# Patient Record
Sex: FEMALE | Race: OTHER | NOT HISPANIC OR LATINO | Employment: PART TIME | URBAN - METROPOLITAN AREA
[De-identification: names, ages, dates, MRNs, and addresses within clinical notes are randomized per-mention and may not be internally consistent; named-entity substitution may affect disease eponyms.]

---

## 2017-01-04 ENCOUNTER — HOSPITAL ENCOUNTER (OUTPATIENT)
Dept: LAB | Facility: MEDICAL CENTER | Age: 57
End: 2017-01-04
Attending: OBSTETRICS & GYNECOLOGY
Payer: COMMERCIAL

## 2017-01-04 LAB
HBV SURFACE AG SERPL QL IA: NEGATIVE
HIV 1+2 AB+HIV1 P24 AG SERPL QL IA: NON REACTIVE
TREPONEMA PALLIDUM IGG+IGM AB [PRESENCE] IN SERUM OR PLASMA BY IMMUNOASSAY: NON REACTIVE

## 2017-01-04 PROCEDURE — 86695 HERPES SIMPLEX TYPE 1 TEST: CPT | Mod: 91

## 2017-01-04 PROCEDURE — 86696 HERPES SIMPLEX TYPE 2 TEST: CPT | Mod: 91

## 2017-01-04 PROCEDURE — 87389 HIV-1 AG W/HIV-1&-2 AB AG IA: CPT

## 2017-01-04 PROCEDURE — 87340 HEPATITIS B SURFACE AG IA: CPT

## 2017-01-04 PROCEDURE — 87591 N.GONORRHOEAE DNA AMP PROB: CPT

## 2017-01-04 PROCEDURE — 86780 TREPONEMA PALLIDUM: CPT

## 2017-01-04 PROCEDURE — 36415 COLL VENOUS BLD VENIPUNCTURE: CPT

## 2017-01-04 PROCEDURE — 87491 CHLMYD TRACH DNA AMP PROBE: CPT

## 2017-01-05 ENCOUNTER — APPOINTMENT (OUTPATIENT)
Dept: RADIOLOGY | Facility: IMAGING CENTER | Age: 57
End: 2017-01-05
Attending: PHYSICIAN ASSISTANT
Payer: COMMERCIAL

## 2017-01-05 ENCOUNTER — OFFICE VISIT (OUTPATIENT)
Dept: URGENT CARE | Facility: PHYSICIAN GROUP | Age: 57
End: 2017-01-05
Payer: COMMERCIAL

## 2017-01-05 VITALS
SYSTOLIC BLOOD PRESSURE: 100 MMHG | BODY MASS INDEX: 20.09 KG/M2 | WEIGHT: 125 LBS | HEART RATE: 82 BPM | TEMPERATURE: 99.1 F | RESPIRATION RATE: 16 BRPM | HEIGHT: 66 IN | DIASTOLIC BLOOD PRESSURE: 72 MMHG | OXYGEN SATURATION: 94 %

## 2017-01-05 DIAGNOSIS — M79.675 PAIN OF TOE OF LEFT FOOT: ICD-10-CM

## 2017-01-05 DIAGNOSIS — S92.535A CLOSED NONDISPLACED FRACTURE OF DISTAL PHALANX OF LESSER TOE OF LEFT FOOT, INITIAL ENCOUNTER: ICD-10-CM

## 2017-01-05 LAB
C TRACH DNA GENITAL QL NAA+PROBE: NEGATIVE
N GONORRHOEA DNA GENITAL QL NAA+PROBE: NEGATIVE
SPECIMEN SOURCE: NORMAL

## 2017-01-05 PROCEDURE — 73660 X-RAY EXAM OF TOE(S): CPT | Mod: TC,LT | Performed by: PHYSICIAN ASSISTANT

## 2017-01-05 PROCEDURE — 99213 OFFICE O/P EST LOW 20 MIN: CPT | Performed by: PHYSICIAN ASSISTANT

## 2017-01-05 RX ORDER — LORATADINE 10 MG/1
10 TABLET ORAL DAILY
COMMUNITY
End: 2021-06-27

## 2017-01-05 ASSESSMENT — ENCOUNTER SYMPTOMS
CONSTITUTIONAL NEGATIVE: 1
NEUROLOGICAL NEGATIVE: 1

## 2017-01-05 NOTE — PROGRESS NOTES
"Subjective:      Madhavi Baires is a 56 y.o. female who presents with Toe Injury        Toe Injury    Patient presents today with 1-2 hour history of stubbing her left pinky toe.  She states that it currently is not too painful but was very much so at the time of injury.  She states her toe was angled outwards and she wrapped it and that seemed to improve.  She is having a hard time bending her 4th and 5th does at all.  No numbness or tingling. She has not taken any medication for this.     Review of Systems   Constitutional: Negative.    Musculoskeletal:        LEFT TOES/SEE HPI   Skin: Negative.    Neurological: Negative.    Endo/Heme/Allergies: Negative.        PMH:  has a past medical history of Chronic rhinitis (3/5/2013); Allergic rhinitis (3/5/2013); and Indigestion.  MEDS:   Current outpatient prescriptions:   •  loratadine (CLARITIN) 10 MG Tab, Take 10 mg by mouth every day., Disp: , Rfl:   ALLERGIES:   Allergies   Allergen Reactions   • Zithromax [Azithromycin Dihydrate] Rash     SURGHX:   Past Surgical History   Procedure Laterality Date   • Tonsillectomy     • Other  2004?     breast implants     SOCHX:  reports that she has never smoked. She has never used smokeless tobacco. She reports that she does not drink alcohol or use illicit drugs.  FH: Family history was reviewed, no pertinent findings to report     Objective:     /72 mmHg  Pulse 82  Temp(Src) 37.3 °C (99.1 °F)  Resp 16  Ht 1.676 m (5' 5.98\")  Wt 56.7 kg (125 lb)  BMI 20.19 kg/m2  SpO2 94%  Breastfeeding? No     Physical Exam   Constitutional: She is oriented to person, place, and time. She appears well-developed and well-nourished. No distress.   Eyes: Conjunctivae and EOM are normal. Pupils are equal, round, and reactive to light.   Neck: Normal range of motion. Neck supple.   Cardiovascular: Normal rate and regular rhythm.    Pulmonary/Chest: Effort normal and breath sounds normal.   Musculoskeletal:        " Feet:    Neurological: She is alert and oriented to person, place, and time.   Skin: Skin is warm and dry. No rash noted.   Psychiatric: She has a normal mood and affect. Her behavior is normal.   Vitals reviewed.       RAD    Impression        Nondisplaced fracture of the proximal phalanx of the fifth digit.    Hallux valgus deformity with mild degenerative changes of the first MTP joint.         Reading Provider Reading Date     Sharlene Roa M.D. Jan 5, 2017        Assessment/Plan:     1. Closed nondisplaced fracture of distal phalanx of lesser toe of left foot, initial encounter  DX-TOE(S) 2+ LEFT       -RAD as above, also reviewed by myself.   -espinoza taped digit, padding between toes.  Offered post op shoe however she declines.   -she will follow up with PCP for recheck, she declines Ortho referral or follow up  -ice elevate, NSAIDs, declines pain medication    Ashley Hart PA-C

## 2017-01-05 NOTE — MR AVS SNAPSHOT
"        Madhavi Baires   2017 10:55 AM   Office Visit   MRN: [id number]    Department:  [facility]   Dept Phone:  [phone]    Description:  Female : 1960   Provider:  Ashley Hart PA-C           Reason for Visit     Toe Injury x2 hours. Pt kicked a wall and injured Lt pinky toe. Redness, swelling, bruising. Lack of movement.       Allergies as of 2017     Allergen Noted Reactions    Zithromax [Azithromycin Dihydrate] 2012   Rash      You were diagnosed with     Closed nondisplaced fracture of distal phalanx of lesser toe of left foot, initial encounter   [822540]         Vital Signs     Blood Pressure Pulse Temperature Respirations Height Weight    100/72 mmHg 82 37.3 °C (99.1 °F) 16 1.676 m (5' 5.98\") 56.7 kg (125 lb)    Body Mass Index Oxygen Saturation Breastfeeding? Smoking Status          20.19 kg/m2 94% No Never Smoker         Basic Information     Date Of Birth Sex Race Ethnicity Preferred Language    1960 Female White Non- English      Your appointments     2017  2:20 PM   Access New Patient with IAN Paniagua   [facility] [address] [id number] [phone]           Please bring Photo ID, Insurance Cards, All Medication Bottles and copies of any legal documents (such as Living Will, Power of ) If speaking a language besides English please bring an adult . Please arrive 30 minutes prior for check in and registration. You will be receiving a confirmation call a few days before your appointment from our automated call confirmation system.              Problem List              ICD-10-CM Priority Class Noted - Resolved    Chronic rhinitis J31.0   3/5/2013 - Present    Allergic rhinitis    3/5/2013 - Present      Health Maintenance        Date Due Completion Dates    IMM DTaP/Tdap/Td Vaccine (1 - Tdap) 1978 ---    PAP SMEAR 1980 ---   " COLONOSCOPY 5/8/2009 ---    IMM INFLUENZA (1) 9/1/2016 1/15/2014    MAMMOGRAM 12/10/2016 12/10/2015, 10/17/2014, 1/5/2013 (Done), 3/1/2010, 3/1/2010, 10/15/2008, 10/15/2008, 7/17/2007, 7/17/2007, 6/27/2005, 6/8/2005    Override on 1/5/2013: Done (normal)            Current Immunizations     Influenza TIV (IM) 1/15/2014    Tuberculin Skin Test 5/7/2014  7:30 AM, 5/6/2013 10:50 AM, 5/14/2012 12:00 PM, 5/7/2012 12:15 PM, 4/19/2011  8:42 AM      Below and/or attached are the medications your provider expects you to take. Review all of your home medications and newly ordered medications with your provider and/or pharmacist. Follow medication instructions as directed by your provider and/or pharmacist. Please keep your medication list with you and share with your provider. Update the information when medications are discontinued, doses are changed, or new medications (including over-the-counter products) are added; and carry medication information at all times in the event of emergency situations     Allergies:  ZITHROMAX - Rash               Medications  Valid as of: January 05, 2017 - 12:31 PM    Generic Name Brand Name Tablet Size Instructions for use    Loratadine (Tab) CLARITIN 10 MG Take 10 mg by mouth every day.        .                 Medicines prescribed today were sent to:     [facility] [address] [place] [address] [place], [place] - [address] [facility] [address] [place] [address] [address] [address]    Phone: [phone] Fax: [phone]    Open 24 Hours?: No      Medication refill instructions:       If your prescription bottle indicates you have medication refills left, it is not necessary to call your provider’s office. Please contact your pharmacy and they will refill your medication.    If your prescription bottle indicates you do not have any refills left, you may request refills at any time through one of the following ways: The online ecobee system (except Urgent Care), by calling your provider’s  office, or by asking your pharmacy to contact your provider’s office with a refill request. Medication refills are processed only during regular business hours and may not be available until the next business day. Your provider may request additional information or to have a follow-up visit with you prior to refilling your medication.   *Please Note: Medication refills are assigned a new Rx number when refilled electronically. Your pharmacy may indicate that no refills were authorized even though a new prescription for the same medication is available at the pharmacy. Please request the medicine by name with the pharmacy before contacting your provider for a refill.        Your To Do List     Future Labs/Procedures Complete By Expires    DX-TOE(S) 2+ LEFT  As directed 1/5/2018         8bit Access Code: Activation code not generated  Current 8bit Status: Active

## 2017-01-17 ENCOUNTER — OFFICE VISIT (OUTPATIENT)
Dept: MEDICAL GROUP | Facility: PHYSICIAN GROUP | Age: 57
End: 2017-01-17
Payer: COMMERCIAL

## 2017-01-17 VITALS
OXYGEN SATURATION: 96 % | BODY MASS INDEX: 20.79 KG/M2 | HEIGHT: 66 IN | HEART RATE: 84 BPM | DIASTOLIC BLOOD PRESSURE: 70 MMHG | RESPIRATION RATE: 16 BRPM | WEIGHT: 129.4 LBS | TEMPERATURE: 98.3 F | SYSTOLIC BLOOD PRESSURE: 104 MMHG

## 2017-01-17 DIAGNOSIS — Z98.82 HISTORY OF BREAST IMPLANT: ICD-10-CM

## 2017-01-17 DIAGNOSIS — Z76.89 ENCOUNTER TO ESTABLISH CARE WITH NEW DOCTOR: ICD-10-CM

## 2017-01-17 PROCEDURE — 99203 OFFICE O/P NEW LOW 30 MIN: CPT | Performed by: NURSE PRACTITIONER

## 2017-01-17 ASSESSMENT — PATIENT HEALTH QUESTIONNAIRE - PHQ9: CLINICAL INTERPRETATION OF PHQ2 SCORE: 0

## 2017-01-17 NOTE — ASSESSMENT & PLAN NOTE
Would like a referral to a plastic surgeon to discuss breast implants.  Had a lumpectomy in 2003 and implants were placed as a result.  She would like to have them reevaluated to make sure that everything is ok.  Discussed options.  A referral will be sent to Dr. Mimi Abbasi.

## 2017-01-17 NOTE — Clinical Note
[facility]  AMAN Paniagua.  [address]  [facility] [address]  Fax: [phone] Authorization for Release/Disclosure of Protected Health Information   Name: ABBIE BAIRES : 1960 SSN: [id number]   Address: [address] [place] [address] Phone:    [phone] (home) [phone] (work)   I authorize the entity listed below to release/disclose the PHI below to [facility]/AMAN Paniagua.   Provider or Entity Name: Dr. Jackson   Address   [facility], [facility], [facility]   Phone:    Fax: [phone]   Reason for request: continuity of care   Information to be released:    [  ] LAST COLONOSCOPY, including any PATH REPORT [  ] LAST DEXA  [  ] LAST MAMMOGRAM  [  ] LAST PAP [  ] RETINA EXAM REPORT  [  ] IMMUNIZATION RECORDS  [X] Release all info      [  ] Check here and initial the line next to each item to release ALL health information INCLUDING  _____ Care and treatment for drug and / or alcohol abuse  _____ HIV testing, infection status, or AIDS  _____ Genetic Testing    DATES OF SERVICE OR TIME PERIOD TO BE DISCLOSED: _____________  I understand and acknowledge that:  * This Authorization may be revoked at any time by you in writing, except if your health information has already been used or disclosed.  * Your health information that will be used or disclosed as a result of you signing this authorization could be re-disclosed by the recipient. If this occurs, your re-disclosed health information may no longer be protected by State or Federal laws.  * You may refuse to sign this Authorization. Your refusal will not affect your ability to obtain treatment.  * This Authorization becomes effective upon signing and will  on (date) __________. If no date is indicated, this Authorization will  one (1) year from the signature date.    Name: Abbie Baires    Signature:   Date: 2017

## 2017-01-17 NOTE — MR AVS SNAPSHOT
"        Madhavi Baires   2017 2:20 PM   Office Visit   MRN: [id number]    Department:  [facility]   Dept Phone:  [phone]    Description:  Female : 1960   Provider:  IAN Paniagua           Reason for Visit     Establish Care           Allergies as of 2017     Allergen Noted Reactions    Zithromax [Azithromycin Dihydrate] 2012   Rash      You were diagnosed with     Encounter to establish care with new doctor   [[id number]]       History of breast implant   [[id number]]         Vital Signs     Blood Pressure Pulse Temperature Respirations Height Weight    104/70 mmHg 84 36.8 °C (98.3 °F) 16 1.676 m (5' 6\") 58.695 kg (129 lb 6.4 oz)    Body Mass Index Oxygen Saturation Smoking Status             20.90 kg/m2 96% Never Smoker          Basic Information     Date Of Birth Sex Race Ethnicity Preferred Language    1960 Female White Non- English      Problem List              ICD-10-CM Priority Class Noted - Resolved    Chronic rhinitis J31.0   3/5/2013 - Present    Allergic rhinitis    3/5/2013 - Present    Encounter to establish care with new doctor Z71.89   2017 - Present      Health Maintenance        Date Due Completion Dates    IMM DTaP/Tdap/Td Vaccine (1 - Tdap) 1978 ---    PAP SMEAR 1980 ---    COLONOSCOPY 2009 ---    IMM INFLUENZA (1) 2016 1/15/2014    MAMMOGRAM 12/10/2016 12/10/2015, 10/17/2014, 2013 (Done), 3/1/2010, 3/1/2010, 10/15/2008, 10/15/2008, 2007, 2007, 2005, 2005    Override on 2013: Done (normal)            Current Immunizations     Influenza TIV (IM) 1/15/2014    Tuberculin Skin Test 2014  7:30 AM, 2013 10:50 AM, 2012 12:00 PM, 2012 12:15 PM, 2011  8:42 AM      Below and/or attached are the medications your provider expects you to take. Review all of your home medications and newly ordered medications with your provider and/or pharmacist. Follow medication " instructions as directed by your provider and/or pharmacist. Please keep your medication list with you and share with your provider. Update the information when medications are discontinued, doses are changed, or new medications (including over-the-counter products) are added; and carry medication information at all times in the event of emergency situations     Allergies:  ZITHROMAX - Rash               Medications  Valid as of: January 17, 2017 -  3:01 PM    Generic Name Brand Name Tablet Size Instructions for use    Loratadine (Tab) CLARITIN 10 MG Take 10 mg by mouth every day.        .                 Medicines prescribed today were sent to:     [facility] #[id number] - [place], [place] - [address]    [address] [facility] [address] [place] [address]    Phone: [phone] Fax: [phone]    Open 24 Hours?: No      Medication refill instructions:       If your prescription bottle indicates you have medication refills left, it is not necessary to call your provider’s office. Please contact your pharmacy and they will refill your medication.    If your prescription bottle indicates you do not have any refills left, you may request refills at any time through one of the following ways: The online Vaddio system (except Urgent Care), by calling your provider’s office, or by asking your pharmacy to contact your provider’s office with a refill request. Medication refills are processed only during regular business hours and may not be available until the next business day. Your provider may request additional information or to have a follow-up visit with you prior to refilling your medication.   *Please Note: Medication refills are assigned a new Rx number when refilled electronically. Your pharmacy may indicate that no refills were authorized even though a new prescription for the same medication is available at the pharmacy. Please request the medicine by name with the pharmacy before contacting your provider for a refill.           Referral     A referral request has been sent to our patient care coordination department. Please allow 3-5 business days for us to process this request and contact you either by phone or mail. If you do not hear from us by the 5th business day, please call us at [phone].           Flybits Access Code: Activation code not generated  Current Flybits Status: Active

## 2017-01-17 NOTE — PROGRESS NOTES
Chief Complaint   Patient presents with   • Establish Care       HISTORY OF PRESENT ILLNESS: Patient is a 56 y.o. female  patient who is here to reestablish care with me in this office.  she was a patient of my previous office.  she is here today to discuss the following issues:    Encounter to establish care with new doctor  Is here to reestablish care with me.  Discussed recent test results that were done.    History of breast implant  Would like a referral to a plastic surgeon to discuss breast implants.  Had a lumpectomy in  and implants were placed as a result.  She would like to have them reevaluated to make sure that everything is ok.  Discussed options.  A referral will be sent to Dr. Mimi Abbasi.      Patient Active Problem List    Diagnosis Date Noted   • Encounter to establish care with new doctor 2017   • History of breast implant 2017   • Chronic rhinitis 2013   • Allergic rhinitis 2013       Allergies:Zithromax    Current Outpatient Prescriptions   Medication Sig Dispense Refill   • loratadine (CLARITIN) 10 MG Tab Take 10 mg by mouth every day.       No current facility-administered medications for this visit.       Social History   Substance Use Topics   • Smoking status: Never Smoker    • Smokeless tobacco: Never Used   • Alcohol Use: No       Family Status   Relation Status Death Age   • Mother     • Father       Family History   Problem Relation Age of Onset   • Diabetes Mother    • Heart Disease Mother      cad   • Cancer Father 44     stomach   • Cancer Maternal Aunt      colon   • Diabetes Maternal Grandmother    • Cancer Maternal Grandmother      breast   • Hypertension Neg Hx    • Hyperlipidemia Neg Hx    • Stroke Neg Hx        Review of Systems:   Constitutional: Negative for fever, chills, weight loss and malaise/fatigue.   HENT: Negative for ear pain, nosebleeds, congestion, sore throat and neck pain.    Eyes: Negative for blurred vision.  "  Respiratory: Negative for cough, sputum production, shortness of breath and wheezing.    Cardiovascular: Negative for chest pain, palpitations, orthopnea and leg swelling.   Gastrointestinal: Negative for heartburn, nausea, vomiting and abdominal pain.   Genitourinary: Negative for dysuria, urgency and frequency.   Musculoskeletal: Negative for myalgias, joint pain, and back pain.  Skin: Negative for rash and itching.   Neurological: Negative for dizziness, tingling, tremors, sensory change, focal weakness and headaches.   Endo/Heme/Allergies: Does not bruise/bleed easily.   Psychiatric/Behavioral: Negative for depression, suicidal ideas and memory loss.  The patient is not nervous/anxious and does not have insomnia.    All other systems reviewed and are negative except as in HPI.    Exam:  Blood pressure 104/70, pulse 84, temperature 36.8 °C (98.3 °F), resp. rate 16, height 1.676 m (5' 6\"), weight 58.695 kg (129 lb 6.4 oz), SpO2 96 %.  General:  Well nourished, well developed female in NAD  Head: Grossly normal.  Neck: Supple without JVD or bruit. Thyroid is not enlarged.  Pulmonary: Clear to ausculation. Normal effort. No rales, ronchi, or wheezing.  Cardiovascular: Regular rate and rhythm without murmur.   Extremities: No clubbing, cyanosis, or edema.  Skin: Intact with no obvious rashes or lesions.  Neuro: Grossly intact.  Psych: Alert and oriented x 3.  Mood and affect appropriate.    Medical decision-making and discussion: Madhavi is here to reestablish care with me.  We reviewed her past medical history and discussed her breast implants.  A referral was sent to Dr. Mimi Abbasi. She will sign a records release for my previous office, she will sign up with [facility], and she will plan to follow-up here as needed.         Assessment/Plan:  1. Encounter to establish care with new doctor     2. History of breast implant  REFERRAL TO PLASTIC SURGERY       Return if symptoms worsen or fail to " improve.    Please note that this dictation was created using voice recognition software. I have made every reasonable attempt to correct obvious errors, but I expect that there are errors of grammar and possibly content that I did not discover before finalizing the note.

## 2017-02-06 LAB
HSV1 GG IGG SER-ACNC: 0.11 IV
HSV2 GG IGG SER-ACNC: 9.04 IV
TEST NAME 95000: NORMAL

## 2017-03-09 ENCOUNTER — OFFICE VISIT (OUTPATIENT)
Dept: MEDICAL GROUP | Facility: PHYSICIAN GROUP | Age: 57
End: 2017-03-09
Payer: COMMERCIAL

## 2017-03-09 VITALS
HEART RATE: 74 BPM | DIASTOLIC BLOOD PRESSURE: 60 MMHG | SYSTOLIC BLOOD PRESSURE: 98 MMHG | OXYGEN SATURATION: 96 % | BODY MASS INDEX: 20.25 KG/M2 | WEIGHT: 126 LBS | TEMPERATURE: 100.2 F | RESPIRATION RATE: 16 BRPM | HEIGHT: 66 IN

## 2017-03-09 DIAGNOSIS — L98.9 SKIN LESIONS, GENERALIZED: ICD-10-CM

## 2017-03-09 DIAGNOSIS — Z12.83 SKIN CANCER SCREENING: ICD-10-CM

## 2017-03-09 PROBLEM — Z76.89 ENCOUNTER TO ESTABLISH CARE WITH NEW DOCTOR: Status: RESOLVED | Noted: 2017-01-17 | Resolved: 2017-03-09

## 2017-03-09 PROBLEM — Z98.82 HISTORY OF BREAST IMPLANT: Status: RESOLVED | Noted: 2017-01-17 | Resolved: 2017-03-09

## 2017-03-09 PROCEDURE — 99213 OFFICE O/P EST LOW 20 MIN: CPT | Performed by: NURSE PRACTITIONER

## 2017-03-09 NOTE — MR AVS SNAPSHOT
"        Madhavi Baires   3/9/2017 2:40 PM   Office Visit   MRN: [id number]    Department:  [facility]   Dept Phone:  [phone]    Description:  Female : 1960   Provider:  IAN Paniagua           Reason for Visit     Referral Needed dermatologist      Allergies as of 3/9/2017     Allergen Noted Reactions    Zithromax [Azithromycin Dihydrate] 2012   Rash      You were diagnosed with     Skin lesions, generalized   [063856]       Skin cancer screening   [241646]         Vital Signs     Blood Pressure Pulse Temperature Respirations Height Weight    98/60 mmHg 74 37.9 °C (100.2 °F) 16 1.676 m (5' 5.98\") 57.153 kg (126 lb)    Body Mass Index Oxygen Saturation Last Menstrual Period Smoking Status          20.35 kg/m2 96% 2015 Never Smoker         Basic Information     Date Of Birth Sex Race Ethnicity Preferred Language    1960 Female White Non- English      Problem List              ICD-10-CM Priority Class Noted - Resolved    Chronic rhinitis J31.0   3/5/2013 - Present    Allergic rhinitis    3/5/2013 - Present    Encounter to establish care with new doctor Z71.89   2017 - Present    History of breast implant Z98.82   2017 - Present      Health Maintenance        Date Due Completion Dates    IMM DTaP/Tdap/Td Vaccine (1 - Tdap) 1978 ---    PAP SMEAR 1980 ---    COLONOSCOPY 2009 ---    IMM INFLUENZA (1) 2016 1/15/2014    MAMMOGRAM 12/10/2016 12/10/2015, 10/17/2014, 2013 (Done), 3/1/2010, 3/1/2010, 10/15/2008, 10/15/2008, 2007, 2007, 2005, 2005    Override on 2013: Done (normal)            Current Immunizations     Influenza TIV (IM) 1/15/2014    Tuberculin Skin Test 2014  7:30 AM, 2013 10:50 AM, 2012 12:00 PM, 2012 12:15 PM, 2011  8:42 AM      Below and/or attached are the medications your provider expects you to take. Review all of your home medications and newly ordered " medications with your provider and/or pharmacist. Follow medication instructions as directed by your provider and/or pharmacist. Please keep your medication list with you and share with your provider. Update the information when medications are discontinued, doses are changed, or new medications (including over-the-counter products) are added; and carry medication information at all times in the event of emergency situations     Allergies:  ZITHROMAX - Rash               Medications  Valid as of: March 09, 2017 -  3:05 PM    Generic Name Brand Name Tablet Size Instructions for use    Loratadine (Tab) CLARITIN 10 MG Take 10 mg by mouth every day.        .                 Medicines prescribed today were sent to:     [facility]Y #[id number] - [place], [place] - [address] [facility] [address]    [address] [facility] [address] [place] [address]    Phone: [phone] Fax: [phone]    Open 24 Hours?: No      Medication refill instructions:       If your prescription bottle indicates you have medication refills left, it is not necessary to call your provider’s office. Please contact your pharmacy and they will refill your medication.    If your prescription bottle indicates you do not have any refills left, you may request refills at any time through one of the following ways: The online Yamli system (except Urgent Care), by calling your provider’s office, or by asking your pharmacy to contact your provider’s office with a refill request. Medication refills are processed only during regular business hours and may not be available until the next business day. Your provider may request additional information or to have a follow-up visit with you prior to refilling your medication.   *Please Note: Medication refills are assigned a new Rx number when refilled electronically. Your pharmacy may indicate that no refills were authorized even though a new prescription for the same medication is available at the pharmacy. Please request the medicine by name  with the pharmacy before contacting your provider for a refill.        Referral     A referral request has been sent to our patient care coordination department. Please allow 3-5 business days for us to process this request and contact you either by phone or mail. If you do not hear from us by the 5th business day, please call us at [phone].           Health Catalyst Access Code: Activation code not generated  Current Health Catalyst Status: Active

## 2017-03-09 NOTE — PROGRESS NOTES
Chief Complaint   Patient presents with   • Referral Needed     dermatologist       HISTORY OF PRESENT ILLNESS: Patient is a 56 y.o. female established patient who presents today to discuss the following issues:    Skin lesions, generalized  Has many hemangiomas and other skin lesions.  She would like a referral to dermatology to have them evaluated and possibly removed.        Patient Active Problem List    Diagnosis Date Noted   • Skin lesions, generalized 2017   • Allergic rhinitis 2013       Allergies:Zithromax    Current Outpatient Prescriptions   Medication Sig Dispense Refill   • loratadine (CLARITIN) 10 MG Tab Take 10 mg by mouth every day.       No current facility-administered medications for this visit.       Social History   Substance Use Topics   • Smoking status: Never Smoker    • Smokeless tobacco: Never Used   • Alcohol Use: No       Family Status   Relation Status Death Age   • Mother     • Father       Family History   Problem Relation Age of Onset   • Diabetes Mother    • Heart Disease Mother      cad   • Cancer Father 44     stomach   • Cancer Maternal Aunt      colon   • Diabetes Maternal Grandmother    • Cancer Maternal Grandmother      breast   • Hypertension Neg Hx    • Hyperlipidemia Neg Hx    • Stroke Neg Hx        Review of Systems:   Constitutional: Negative for fever, chills, weight loss and malaise/fatigue.   Skin: Negative for rash and itching. 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  Neurological: Negative for dizziness, tingling, tremors, sensory change, focal weakness and headaches.   Endo/Heme/Allergies: Does not bruise/bleed easily.   Psychiatric/Behavioral: Negative for depression, suicidal ideas and memory loss.  The patient is not nervous/anxious and does not have insomnia.    All other systems reviewed and are negative except as in HPI.    Exam:  Blood pressure 98/60, pulse 74, temperature 37.9 °C (100.2 °F), resp. rate 16, height  "1.676 m (5' 5.98\"), weight 57.153 kg (126 lb), last menstrual period 03/09/2015, SpO2 96 %.  General:  Well nourished, well developed female in NAD  Head: Grossly normal.  Extremities: No clubbing, cyanosis, or edema.  Skin: Intact with no obvious rashes or lesions.  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  Neuro: Grossly intact.  Psych: Alert and oriented x 3.  Mood and affect appropriate.    Medical decision-making and discussion: Madhavi is here today to discuss referral.  A referral was sent to dermatology, and she will follow-up here as needed.          Assessment/Plan:  1. Skin lesions, generalized  REFERRAL TO DERMATOLOGY   2. Skin cancer screening  REFERRAL TO DERMATOLOGY       Return if symptoms worsen or fail to improve.    Please note that this dictation was created using voice recognition software. I have made every reasonable attempt to correct obvious errors, but I expect that there are errors of grammar and possibly content that I did not discover before finalizing the note.    "

## 2017-03-09 NOTE — ASSESSMENT & PLAN NOTE
Has many hemangiomas and other skin lesions.  She would like a referral to dermatology to have them evaluated and possibly removed.

## 2017-08-10 ENCOUNTER — HOSPITAL ENCOUNTER (OUTPATIENT)
Facility: MEDICAL CENTER | Age: 57
End: 2017-08-10
Payer: COMMERCIAL

## 2017-08-10 LAB
BDY FAT % MEASURED: 28.3 %
BP DIAS: 75 MMHG
BP SYS: 114 MMHG
CHOLEST SERPL-MCNC: 202 MG/DL (ref 100–199)
DIABETES HTDIA: NO
EVENT NAME HTEVT: NORMAL
FASTING HTFAS: YES
GLUCOSE SERPL-MCNC: 88 MG/DL (ref 65–99)
HDLC SERPL-MCNC: 60 MG/DL
HYPERTENSION HTHYP: NO
LDLC SERPL CALC-MCNC: 124 MG/DL
SCREENING LOC CITY HTCIT: NORMAL
SCREENING LOC STATE HTSTA: NORMAL
SCREENING LOCATION HTLOC: NORMAL
SMOKING HTSMO: NO
SUBSCRIBER ID HTSID: NORMAL
TRIGL SERPL-MCNC: 92 MG/DL (ref 0–149)

## 2017-08-10 PROCEDURE — 36415 COLL VENOUS BLD VENIPUNCTURE: CPT

## 2017-08-10 PROCEDURE — 82947 ASSAY GLUCOSE BLOOD QUANT: CPT

## 2017-08-10 PROCEDURE — S5190 WELLNESS ASSESSMENT BY NONPH: HCPCS

## 2017-08-10 PROCEDURE — 80061 LIPID PANEL: CPT

## 2017-09-12 ENCOUNTER — APPOINTMENT (OUTPATIENT)
Dept: RADIOLOGY | Facility: MEDICAL CENTER | Age: 57
End: 2017-09-12
Attending: OBSTETRICS & GYNECOLOGY
Payer: COMMERCIAL

## 2017-11-14 ENCOUNTER — IMMUNIZATION (OUTPATIENT)
Dept: OCCUPATIONAL MEDICINE | Facility: CLINIC | Age: 57
End: 2017-11-14

## 2017-11-14 DIAGNOSIS — Z23 NEED FOR VACCINATION: ICD-10-CM

## 2017-11-14 PROCEDURE — 90686 IIV4 VACC NO PRSV 0.5 ML IM: CPT | Performed by: PREVENTIVE MEDICINE

## 2017-11-15 ENCOUNTER — OFFICE VISIT (OUTPATIENT)
Dept: MEDICAL GROUP | Facility: PHYSICIAN GROUP | Age: 57
End: 2017-11-15
Payer: COMMERCIAL

## 2017-11-15 VITALS
DIASTOLIC BLOOD PRESSURE: 80 MMHG | RESPIRATION RATE: 14 BRPM | HEART RATE: 65 BPM | OXYGEN SATURATION: 97 % | SYSTOLIC BLOOD PRESSURE: 128 MMHG | WEIGHT: 134 LBS | HEIGHT: 66 IN | BODY MASS INDEX: 21.53 KG/M2 | TEMPERATURE: 99.3 F

## 2017-11-15 DIAGNOSIS — Z12.11 ENCOUNTER FOR SCREENING FECAL OCCULT BLOOD TESTING: ICD-10-CM

## 2017-11-15 DIAGNOSIS — T23.162A SUPERFICIAL BURN OF BACK OF LEFT HAND, INITIAL ENCOUNTER: ICD-10-CM

## 2017-11-15 PROCEDURE — 99214 OFFICE O/P EST MOD 30 MIN: CPT | Performed by: NURSE PRACTITIONER

## 2017-11-15 RX ORDER — AMOXICILLIN AND CLAVULANATE POTASSIUM 875; 125 MG/1; MG/1
1 TABLET, FILM COATED ORAL 2 TIMES DAILY
Qty: 20 TAB | Refills: 0 | Status: SHIPPED | OUTPATIENT
Start: 2017-11-15 | End: 2018-01-07
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
## 2017-11-28 ENCOUNTER — HOSPITAL ENCOUNTER (OUTPATIENT)
Facility: MEDICAL CENTER | Age: 57
End: 2017-11-28
Attending: NURSE PRACTITIONER
Payer: COMMERCIAL

## 2017-11-28 PROCEDURE — 82274 ASSAY TEST FOR BLOOD FECAL: CPT

## 2017-11-30 DIAGNOSIS — Z12.11 ENCOUNTER FOR SCREENING FECAL OCCULT BLOOD TESTING: ICD-10-CM

## 2017-11-30 LAB — HEMOCCULT STL QL IA: NEGATIVE

## 2017-12-04 ENCOUNTER — TELEPHONE (OUTPATIENT)
Dept: MEDICAL GROUP | Facility: PHYSICIAN GROUP | Age: 57
End: 2017-12-04

## 2017-12-04 NOTE — LETTER
December 4, 2017         Madhavi Hoyos  [address] [facility] [place] [address]        Dear Madhavi:      Below are the results from your recent visit:    Please notify patient that stool test is negative for blood.  This test should be repeated annually for colon cancer screening.   AVE Mae Orders   OCCULT BLOOD FECES IMMUNOASSAY   Result Value Ref Range    Occult Blood, IA Negative Negative     If you have any questions or concerns, please don't hesitate to call.    Electronically Signed

## 2017-12-05 NOTE — TELEPHONE ENCOUNTER
----- Message from AVE Mae sent at 12/4/2017 12:32 PM PST -----  Please notify patient that stool test is negative for blood.  This test should be repeated annually for colon cancer screening.  AVE Mae

## 2018-01-07 ENCOUNTER — OFFICE VISIT (OUTPATIENT)
Dept: URGENT CARE | Facility: PHYSICIAN GROUP | Age: 58
End: 2018-01-07
Payer: COMMERCIAL

## 2018-01-07 VITALS
DIASTOLIC BLOOD PRESSURE: 70 MMHG | SYSTOLIC BLOOD PRESSURE: 90 MMHG | WEIGHT: 134 LBS | OXYGEN SATURATION: 98 % | HEART RATE: 69 BPM | RESPIRATION RATE: 16 BRPM | BODY MASS INDEX: 21.63 KG/M2 | TEMPERATURE: 97.4 F

## 2018-01-07 DIAGNOSIS — J01.00 ACUTE MAXILLARY SINUSITIS, RECURRENCE NOT SPECIFIED: Primary | ICD-10-CM

## 2018-01-07 PROCEDURE — 99214 OFFICE O/P EST MOD 30 MIN: CPT | Performed by: PHYSICIAN ASSISTANT

## 2018-01-07 RX ORDER — ASPIRIN 325 MG
325 TABLET ORAL EVERY 6 HOURS PRN
COMMUNITY
End: 2018-05-04

## 2018-01-07 RX ORDER — AMOXICILLIN 875 MG/1
875 TABLET, COATED ORAL 2 TIMES DAILY
Qty: 20 TAB | Refills: 0 | Status: SHIPPED | OUTPATIENT
Start: 2018-01-07 | End: 2018-05-04

## 2018-01-07 ASSESSMENT — ENCOUNTER SYMPTOMS
SORE THROAT: 0
COUGH: 0
HEADACHES: 1
FEVER: 0

## 2018-01-07 ASSESSMENT — PAIN SCALES - GENERAL: PAINLEVEL: 1=MINIMAL PAIN

## 2018-01-07 NOTE — PROGRESS NOTES
Subjective:      Madhavi Hoyos is a 57 y.o. female who presents with Otalgia (x2 weeks. Lt ear pain, slight dizziness. Pt is worried about a sinus infection.)    PMH:  has a past medical history of Allergic rhinitis (3/5/2013); Chronic rhinitis (3/5/2013); and Indigestion.  MEDS:   Current Outpatient Prescriptions:   •  aspirin (ASA) 325 MG Tab, Take 325 mg by mouth every 6 hours as needed., Disp: , Rfl:   •  loratadine (CLARITIN) 10 MG Tab, Take 10 mg by mouth every day., Disp: , Rfl:   •  amoxicillin-clavulanate (AUGMENTIN) 875-125 MG Tab, Take 1 Tab by mouth 2 times a day., Disp: 20 Tab, Rfl: 0  ALLERGIES:   Allergies   Allergen Reactions   • Zithromax [Azithromycin Dihydrate] Rash     SURGHX:   Past Surgical History:   Procedure Laterality Date   • OTHER  2004?    breast implants   • TONSILLECTOMY       SOCHX:  reports that she has never smoked. She has never used smokeless tobacco. She reports that she does not drink alcohol or use drugs.  FH: family history includes Cancer in her maternal aunt and maternal grandmother; Cancer (age of onset: 44) in her father; Diabetes in her maternal grandmother and mother; Heart Disease in her mother. Reviewed with patient/family. Not pertinent to this complaint.            Patient presents to clinic today with complaint of sinus pain, pressure, sinus headache, and ear pain ×2 weeks. Patient states it started with a typical cold, cough is resolved, but sinus pain and congestion has remained. Patient states she has had some persistent left ear pain ×4 days. Patient has been taking over-the-counter medications with very little relief. Patient also states little bit of vertigo type sensation when she got out of bed this morning which improved after sitting up for a few hours. Patient denies any other complaint.      Otalgia    There is pain in the left ear. This is a new problem. The current episode started 1 to 4 weeks ago. The problem occurs constantly. There has  been no fever. The pain is at a severity of 5/10. Associated symptoms include headaches. Pertinent negatives include no coughing or sore throat. Associated symptoms comments: Sinus pain and pressure. There is no history of a chronic ear infection.       Review of Systems   Constitutional: Negative for fever.   HENT: Positive for congestion and ear pain. Negative for sore throat.    Respiratory: Negative for cough.    Neurological: Positive for headaches.   All other systems reviewed and are negative.         Objective:     BP (!) 90/70   Pulse 69   Temp 36.3 °C (97.4 °F)   Resp 16   Wt 60.8 kg (134 lb)   SpO2 98%   Breastfeeding? No   BMI 21.63 kg/m²      Physical Exam   Constitutional: She is oriented to person, place, and time. She appears well-developed and well-nourished. No distress.   HENT:   Head: Normocephalic and atraumatic.   Right Ear: A middle ear effusion is present.   Left Ear: Tympanic membrane normal.   Nose: Mucosal edema present. Right sinus exhibits maxillary sinus tenderness. Left sinus exhibits maxillary sinus tenderness.   Mouth/Throat: Uvula is midline and mucous membranes are normal. Posterior oropharyngeal erythema present. No oropharyngeal exudate or posterior oropharyngeal edema.   Eyes: Conjunctivae, EOM and lids are normal. Pupils are equal, round, and reactive to light.   Neck: Normal range of motion. Neck supple.   Cardiovascular: Normal rate and regular rhythm.    Pulmonary/Chest: Effort normal and breath sounds normal. No respiratory distress.   Abdominal: Soft.   Musculoskeletal: Normal range of motion.   Lymphadenopathy:     She has no cervical adenopathy.   Neurological: She is alert and oriented to person, place, and time.   Skin: Skin is warm and dry. Capillary refill takes less than 2 seconds.   Psychiatric: She has a normal mood and affect.   Nursing note and vitals reviewed.              Assessment/Plan:     1. Acute maxillary sinusitis, recurrence not specified   amoxicillin (AMOXIL) 875 MG tablet     Motrin/Advil/Ibuprophen 600 mg every 6 hours as needed for pain or fever.    PT should follow up with PCP in 1-2 days for re-evaluation if symptoms have not improved.  Discussed red flags and reasons to return to UC or ED.  Pt and/or family verbalized understanding of diagnosis and follow up instructions and was offered informational handout on diagnosis.  PT discharged.

## 2018-02-27 ENCOUNTER — HOSPITAL ENCOUNTER (OUTPATIENT)
Dept: RADIOLOGY | Facility: MEDICAL CENTER | Age: 58
End: 2018-02-27
Attending: OBSTETRICS & GYNECOLOGY
Payer: COMMERCIAL

## 2018-02-27 DIAGNOSIS — Z12.31 ENCOUNTER FOR SCREENING MAMMOGRAM FOR MALIGNANT NEOPLASM OF BREAST: ICD-10-CM

## 2018-02-27 PROCEDURE — 77067 SCR MAMMO BI INCL CAD: CPT

## 2018-05-04 ENCOUNTER — OFFICE VISIT (OUTPATIENT)
Dept: MEDICAL GROUP | Facility: MEDICAL CENTER | Age: 58
End: 2018-05-04
Payer: COMMERCIAL

## 2018-05-04 VITALS
DIASTOLIC BLOOD PRESSURE: 64 MMHG | TEMPERATURE: 98 F | RESPIRATION RATE: 16 BRPM | SYSTOLIC BLOOD PRESSURE: 126 MMHG | BODY MASS INDEX: 21.86 KG/M2 | OXYGEN SATURATION: 94 % | WEIGHT: 136 LBS | HEIGHT: 66 IN | HEART RATE: 75 BPM

## 2018-05-04 DIAGNOSIS — J01.00 ACUTE NON-RECURRENT MAXILLARY SINUSITIS: ICD-10-CM

## 2018-05-04 DIAGNOSIS — R09.81 SINUS CONGESTION: ICD-10-CM

## 2018-05-04 PROCEDURE — 99214 OFFICE O/P EST MOD 30 MIN: CPT | Performed by: NURSE PRACTITIONER

## 2018-05-04 RX ORDER — FLUTICASONE PROPIONATE 50 MCG
2 SPRAY, SUSPENSION (ML) NASAL DAILY
Qty: 16 G | Refills: 11 | Status: SHIPPED | OUTPATIENT
Start: 2018-05-04 | End: 2021-06-27

## 2018-05-04 RX ORDER — AZELASTINE 1 MG/ML
1 SPRAY, METERED NASAL 2 TIMES DAILY
Qty: 30 ML | Refills: 11 | Status: SHIPPED | OUTPATIENT
Start: 2018-05-04 | End: 2018-12-26

## 2018-05-04 ASSESSMENT — PATIENT HEALTH QUESTIONNAIRE - PHQ9: CLINICAL INTERPRETATION OF PHQ2 SCORE: 0

## 2018-05-04 ASSESSMENT — ENCOUNTER SYMPTOMS: HEADACHES: 1

## 2018-05-04 NOTE — PROGRESS NOTES
Subjective:      Madhavi Hoyos is a 57 y.o. female who presents with Other (possiblesinus infection started yesterday)        CC: Patient here today mainly for sinus complaints. This is a same day visit and her normal medical provider is .    HPI Madhavi Hoyos gives history of sinus congestion for approximately the last 2 days. The pain is mostly in her maxillary region bilaterally and with this pain and pressure she has been having postnasal drip and stomach upset. It appears she was treated in January at urgent care for a possible sinusitis although she states she did not take the antibiotics. Her main concern if she would be traveling this weekend and knows that her ears and sinuses will feel worse with travel at high altitude. She brings a form and with her today.    Patient admits she probably has some allergies and does do sinus irrigation but is not using nasal sprays or antihistamines currently. She states she did see an ENT years ago and was told she did have blockage of her sinuses but no surgery was recommended.      Social History   Substance Use Topics   • Smoking status: Never Smoker   • Smokeless tobacco: Never Used   • Alcohol use No     Current Outpatient Prescriptions   Medication Sig Dispense Refill   • fluticasone (FLONASE) 50 MCG/ACT nasal spray Spray 2 Sprays in nose every day. 16 g 11   • azelastine (ASTELIN) 137 MCG/SPRAY nasal spray [place] 1 Spray in nose 2 times a day. 30 mL 11   • loratadine (CLARITIN) 10 MG Tab Take 10 mg by mouth every day.       No current facility-administered medications for this visit.      Past Medical History:   Diagnosis Date   • Allergic rhinitis 3/5/2013   • Chronic rhinitis 3/5/2013   • Indigestion      Family History   Problem Relation Age of Onset   • Diabetes Mother    • Heart Disease Mother      cad   • Cancer Father 44     stomach   • Cancer Maternal Aunt      colon   • Diabetes Maternal Grandmother    • Cancer Maternal  "Grandmother      breast   • Breast Cancer Maternal Grandmother    • Hypertension Neg Hx    • Hyperlipidemia Neg Hx    • Stroke Neg Hx        Review of Systems   HENT: Positive for congestion.    Neurological: Positive for headaches.   All other systems reviewed and are negative.         Objective:     /64   Pulse 75   Temp 36.7 °C (98 °F)   Resp 16   Ht 1.676 m (5' 6\")   Wt 61.7 kg (136 lb)   SpO2 94%   BMI 21.95 kg/m²      Physical Exam   Constitutional: She is oriented to person, place, and time. She appears well-developed and well-nourished. No distress.   HENT:   Head: Normocephalic and atraumatic.   Right Ear: External ear normal.   Left Ear: External ear normal.   Nose: Mucosal edema and sinus tenderness present. Right sinus exhibits maxillary sinus tenderness. Left sinus exhibits maxillary sinus tenderness.   Eyes: Right eye exhibits no discharge. Left eye exhibits no discharge.   Neck: Normal range of motion. Neck supple. No thyromegaly present.   Cardiovascular: Normal rate, regular rhythm and normal heart sounds.  Exam reveals no gallop and no friction rub.    No murmur heard.  Pulmonary/Chest: Effort normal and breath sounds normal. She has no wheezes. She has no rales.   Musculoskeletal: She exhibits no edema or tenderness.   Neurological: She is alert and oriented to person, place, and time. She displays normal reflexes.   Skin: Skin is warm and dry. No rash noted. She is not diaphoretic.   Psychiatric: She has a normal mood and affect. Her behavior is normal. Judgment and thought content normal.   Nursing note and vitals reviewed.              Assessment/Plan:     1. Sinus congestion/sinusitis  I think it is too early to call this a sinus infection but she does have sinus congestion and pain. I agree that flying at high altitude at this point could cause a sinus infection or at the very least increased pain in her ears and sinuses. I will therefore fill out paperwork with Dr. Jolley for " her not to fly this weekend. I will start her on Flonase nasal spray as well as an antihistamine nasal spray to help with symptoms. I told her if this is recurring on a regular basis she may wish to consider an ENT referral. She does have her prescription of amoxicillin available from January which she was advised to start on if symptoms do not improve in the next week. Case discussed with Dr. Jolley and form for airline filled out together.  - fluticasone (FLONASE) 50 MCG/ACT nasal spray; Spray 2 Sprays in nose every day.  Dispense: 16 g; Refill: 11  - azelastine (ASTELIN) 137 MCG/SPRAY nasal spray; Spray 1 Spray in nose 2 times a day.  Dispense: 30 mL; Refill: 11

## 2018-05-29 ENCOUNTER — OFFICE VISIT (OUTPATIENT)
Dept: MEDICAL GROUP | Facility: MEDICAL CENTER | Age: 58
End: 2018-05-29
Payer: COMMERCIAL

## 2018-05-29 VITALS
SYSTOLIC BLOOD PRESSURE: 112 MMHG | BODY MASS INDEX: 21.86 KG/M2 | OXYGEN SATURATION: 97 % | TEMPERATURE: 98.4 F | HEART RATE: 71 BPM | DIASTOLIC BLOOD PRESSURE: 70 MMHG | RESPIRATION RATE: 16 BRPM | WEIGHT: 136 LBS | HEIGHT: 66 IN

## 2018-05-29 DIAGNOSIS — H92.02 ACUTE OTALGIA, LEFT: ICD-10-CM

## 2018-05-29 DIAGNOSIS — H66.90 ACUTE OTITIS MEDIA, UNSPECIFIED OTITIS MEDIA TYPE: ICD-10-CM

## 2018-05-29 PROCEDURE — 99214 OFFICE O/P EST MOD 30 MIN: CPT | Performed by: NURSE PRACTITIONER

## 2018-05-29 RX ORDER — AMOXICILLIN AND CLAVULANATE POTASSIUM 875; 125 MG/1; MG/1
1 TABLET, FILM COATED ORAL 2 TIMES DAILY
Qty: 20 TAB | Refills: 0 | Status: SHIPPED | OUTPATIENT
Start: 2018-05-29 | End: 2018-06-08

## 2018-05-29 NOTE — PROGRESS NOTES
Subjective:      Madhavi Hoyos is a 58 y.o. female who presents with Otalgia (left ear) and Dizziness        CC: Patient here same day visit for recurring left ear pain.    HPI Madhavi Hoyos gives history of problems on and off for a few years which usually affects her left ear. She states that she will get pruritus of the ear canal as well as ear pain. She was seen about 3 weeks ago for sinus issues and was placed on Astelin and Flonase nasal spray. She states this is helped with her sinus issues and she was given a letter so she did not have to fly knowing that this would have made things worse.    Her current problem started a few days ago and she has been using a leftover antibiotic eardrop which was prescribed a few years ago. She states there is discomfort in the left ear and throbbing. She denies fever, discharge, cough, shortness of breath, sore throat or chills. Nothing makes her feel better or worse. She states she did see ENT years ago but would like another referral.        Social History   Substance Use Topics   • Smoking status: Never Smoker   • Smokeless tobacco: Never Used   • Alcohol use No     Past Medical History:   Diagnosis Date   • Allergic rhinitis 3/5/2013   • Chronic rhinitis 3/5/2013   • Indigestion      Current Outpatient Prescriptions   Medication Sig Dispense Refill   • amoxicillin-clavulanate (AUGMENTIN) 875-125 MG Tab Take 1 Tab by mouth 2 times a day for 10 days. 20 Tab 0   • fluticasone (FLONASE) 50 MCG/ACT nasal spray Spray 2 Sprays in nose every day. 16 g 11   • azelastine (ASTELIN) 137 MCG/SPRAY nasal spray [place] 1 Spray in nose 2 times a day. 30 mL 11   • loratadine (CLARITIN) 10 MG Tab Take 10 mg by mouth every day.       No current facility-administered medications for this visit.      Family History   Problem Relation Age of Onset   • Diabetes Mother    • Heart Disease Mother      cad   • Cancer Father 44     stomach   • Cancer Maternal Aunt      colon  "  • Diabetes Maternal Grandmother    • Cancer Maternal Grandmother      breast   • Breast Cancer Maternal Grandmother    • Hypertension Neg Hx    • Hyperlipidemia Neg Hx    • Stroke Neg Hx        Review of Systems   HENT: Positive for ear pain and tinnitus.    All other systems reviewed and are negative.         Objective:     /70   Pulse 71   Temp 36.9 °C (98.4 °F)   Resp 16   Ht 1.676 m (5' 6\")   Wt 61.7 kg (136 lb)   SpO2 97%   BMI 21.95 kg/m²      Physical Exam   Constitutional: She is oriented to person, place, and time. She appears well-developed and well-nourished. No distress.   HENT:   Head: Normocephalic and atraumatic.   Right Ear: External ear normal.   Left Ear: External ear and ear canal normal. Tympanic membrane is retracted. A middle ear effusion is present.   Nose: Nose normal.   Eyes: Right eye exhibits no discharge. Left eye exhibits no discharge.   Neck: Normal range of motion. Neck supple. No thyromegaly present.   Cardiovascular: Normal rate, regular rhythm and normal heart sounds.  Exam reveals no gallop and no friction rub.    No murmur heard.  Pulmonary/Chest: Effort normal and breath sounds normal. She has no wheezes. She has no rales.   Musculoskeletal: She exhibits no edema or tenderness.   Neurological: She is alert and oriented to person, place, and time. She displays normal reflexes.   Skin: Skin is warm and dry. No rash noted. She is not diaphoretic.   Psychiatric: She has a normal mood and affect. Her behavior is normal. Judgment and thought content normal.   Nursing note and vitals reviewed.              Assessment/Plan:     1. Acute otitis media, unspecified otitis media type  Patient will start on antibiotics and I advised her to stop her outdated antibiotic eardrops. She may use Tylenol for pain. She will keep well hydrated and I will refer her to ENT.  - amoxicillin-clavulanate (AUGMENTIN) 875-125 MG Tab; Take 1 Tab by mouth 2 times a day for 10 days.  Dispense: 20 " Tab; Refill: 0    2. Acute otalgia, left  This is a recurring problem so I will refer patient to ENT. She will continue on her Flonase and Astelin nasal spray for now since her sinus issues may be playing a part in this and she does show effusion behind her tympanic membrane.  - REFERRAL TO ENT

## 2018-06-11 ENCOUNTER — OFFICE VISIT (OUTPATIENT)
Dept: MEDICAL GROUP | Facility: MEDICAL CENTER | Age: 58
End: 2018-06-11
Payer: COMMERCIAL

## 2018-06-11 ENCOUNTER — SUPERVISING PHYSICIAN REVIEW (OUTPATIENT)
Dept: MEDICAL GROUP | Facility: MEDICAL CENTER | Age: 58
End: 2018-06-11

## 2018-06-11 VITALS
SYSTOLIC BLOOD PRESSURE: 104 MMHG | HEART RATE: 72 BPM | HEIGHT: 66 IN | BODY MASS INDEX: 21.86 KG/M2 | TEMPERATURE: 98.3 F | WEIGHT: 136 LBS | DIASTOLIC BLOOD PRESSURE: 70 MMHG | OXYGEN SATURATION: 97 % | RESPIRATION RATE: 16 BRPM

## 2018-06-11 DIAGNOSIS — M79.671 PAIN OF RIGHT HEEL: ICD-10-CM

## 2018-06-11 DIAGNOSIS — H69.92 DYSFUNCTION OF LEFT EUSTACHIAN TUBE: ICD-10-CM

## 2018-06-11 PROBLEM — L98.9 SKIN LESIONS, GENERALIZED: Status: RESOLVED | Noted: 2017-03-09 | Resolved: 2018-06-11

## 2018-06-11 PROBLEM — T23.162A: Status: RESOLVED | Noted: 2017-11-15 | Resolved: 2018-06-11

## 2018-06-11 PROCEDURE — 99213 OFFICE O/P EST LOW 20 MIN: CPT | Performed by: NURSE PRACTITIONER

## 2018-06-11 NOTE — PROGRESS NOTES
I have reviewed and/ or discussed the encounter dated today with the mid level provider.   Face to face encounter/direct observation: no    DAMIAN Jolley Md.

## 2018-06-11 NOTE — PROGRESS NOTES
Subjective:      Madhavi Hoyos is a 58 y.o. female who presents with Other (numbness in left  knee/ pain in the right heal, ankle numbness in the right one )        CC: Patient is here today to establish care and for right heel pain and continued issues with her left ear.    HPI Madhavi Hoyos      1. Pain of right heel  Patient states that for the past few weeks he has had numbness and sometimes pain in her right heel. She thought it might be related to a right hip injury from years ago but there is no pain radiating down the leg or back pain today. It tends to come and go. Sometimes she also has some numbness of her left patella. She is still able to walk on the area and has not noticed redness or swelling.    2. Dysfunction of left eustachian tube  Patient had sinus issues in May for which she was treated with medications including antibiotics. She has recurrent sinus and ear-related issues for which she was referred to ENT and is awaiting her appointment. She has found that the Astelin and Flonase nasal spray has been helpful but will be going on a plane trip in a week.  Current Outpatient Prescriptions   Medication Sig Dispense Refill   • fluticasone (FLONASE) 50 MCG/ACT nasal spray Spray 2 Sprays in nose every day. 16 g 11   • azelastine (ASTELIN) 137 MCG/SPRAY nasal spray [place] 1 Spray in nose 2 times a day. 30 mL 11   • loratadine (CLARITIN) 10 MG Tab Take 10 mg by mouth every day.       No current facility-administered medications for this visit.      Social History   Substance Use Topics   • Smoking status: Never Smoker   • Smokeless tobacco: Never Used   • Alcohol use No     Past Medical History:   Diagnosis Date   • Allergic rhinitis 3/5/2013   • Chronic rhinitis 3/5/2013   • Indigestion      Family History   Problem Relation Age of Onset   • Diabetes Mother    • Heart Disease Mother      cad   • Cancer Father 44     stomach   • Cancer Maternal Aunt      colon   • Diabetes Maternal  "Grandmother    • Cancer Maternal Grandmother      breast   • Breast Cancer Maternal Grandmother    • Hypertension Neg Hx    • Hyperlipidemia Neg Hx    • Stroke Neg Hx        Review of Systems   HENT: Positive for ear pain.    Musculoskeletal: Positive for joint pain.   All other systems reviewed and are negative.         Objective:     /70   Pulse 72   Temp 36.8 °C (98.3 °F)   Resp 16   Ht 1.676 m (5' 6\")   Wt 61.7 kg (136 lb)   SpO2 97%   BMI 21.95 kg/m²      Physical Exam   Constitutional: She is oriented to person, place, and time. She appears well-developed and well-nourished. No distress.   HENT:   Head: Normocephalic and atraumatic.   Right Ear: External ear normal.   Left Ear: External ear normal. Tympanic membrane is retracted.   Nose: Nose normal.   Eyes: Right eye exhibits no discharge. Left eye exhibits no discharge.   Neck: Normal range of motion. Neck supple. No thyromegaly present.   Cardiovascular: Normal rate, regular rhythm and normal heart sounds.  Exam reveals no gallop and no friction rub.    No murmur heard.  Pulmonary/Chest: Effort normal and breath sounds normal. She has no wheezes. She has no rales.   Musculoskeletal: She exhibits no edema or tenderness.   Normal exam of right heel and no tenderness to palpation.   Neurological: She is alert and oriented to person, place, and time. She displays normal reflexes.   Skin: Skin is warm and dry. No rash noted. She is not diaphoretic.   Psychiatric: She has a normal mood and affect. Her behavior is normal. Judgment and thought content normal.   Nursing note and vitals reviewed.              Assessment/Plan:     1. Pain of right heel  I am not sure why patient has numbness and tingling of the heel explained could be bone spurs or plantar fasciitis and since it is persisting I will refer her to podiatry and advised her to wear good fitting shoes and keep pressure off the area when it is bothering her.  - REFERRAL TO PODIATRY    2. " Dysfunction of left eustachian tube  Patient will continue with her antihistamine and steroid nasal sprays and follow up with ENT. I advised her to chew gum during her plane flight and if she develops severe air block, she is to use over-the-counter Afrin nasal spray temporarily.

## 2018-08-07 ENCOUNTER — HOSPITAL ENCOUNTER (OUTPATIENT)
Dept: LAB | Facility: MEDICAL CENTER | Age: 58
End: 2018-08-07
Payer: COMMERCIAL

## 2018-08-07 LAB
BDY FAT % MEASURED: 33.7 %
BP DIAS: 69 MMHG
BP SYS: 116 MMHG
CHOLEST SERPL-MCNC: 174 MG/DL (ref 100–199)
DIABETES HTDIA: NO
EVENT NAME HTEVT: NORMAL
FASTING HTFAS: YES
GLUCOSE SERPL-MCNC: 83 MG/DL (ref 65–99)
HDLC SERPL-MCNC: 53 MG/DL
HYPERTENSION HTHYP: NO
LDLC SERPL CALC-MCNC: 105 MG/DL
SCREENING LOC CITY HTCIT: NORMAL
SCREENING LOC STATE HTSTA: NORMAL
SCREENING LOCATION HTLOC: NORMAL
SMOKING HTSMO: NO
SUBSCRIBER ID HTSID: NORMAL
TRIGL SERPL-MCNC: 78 MG/DL (ref 0–149)

## 2018-08-07 PROCEDURE — 36415 COLL VENOUS BLD VENIPUNCTURE: CPT

## 2018-08-07 PROCEDURE — 82947 ASSAY GLUCOSE BLOOD QUANT: CPT

## 2018-08-07 PROCEDURE — 80061 LIPID PANEL: CPT

## 2018-08-07 PROCEDURE — S5190 WELLNESS ASSESSMENT BY NONPH: HCPCS

## 2018-09-20 ENCOUNTER — OFFICE VISIT (OUTPATIENT)
Dept: URGENT CARE | Facility: PHYSICIAN GROUP | Age: 58
End: 2018-09-20
Payer: COMMERCIAL

## 2018-09-20 ENCOUNTER — APPOINTMENT (OUTPATIENT)
Dept: RADIOLOGY | Facility: IMAGING CENTER | Age: 58
End: 2018-09-20
Attending: PHYSICIAN ASSISTANT
Payer: COMMERCIAL

## 2018-09-20 VITALS
WEIGHT: 136.8 LBS | BODY MASS INDEX: 21.98 KG/M2 | TEMPERATURE: 100 F | HEART RATE: 78 BPM | HEIGHT: 66 IN | OXYGEN SATURATION: 96 % | SYSTOLIC BLOOD PRESSURE: 122 MMHG | DIASTOLIC BLOOD PRESSURE: 68 MMHG | RESPIRATION RATE: 12 BRPM
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
## 2018-09-20 DIAGNOSIS — S90.31XA CONTUSION OF RIGHT FOOT, INITIAL ENCOUNTER: Primary | ICD-10-CM

## 2018-09-20 DIAGNOSIS — S71.111A LACERATION OF RIGHT THIGH, INITIAL ENCOUNTER: ICD-10-CM

## 2018-09-20 DIAGNOSIS — S90.31XA CONTUSION OF RIGHT FOOT, INITIAL ENCOUNTER: ICD-10-CM

## 2018-09-20 PROCEDURE — 90471 IMMUNIZATION ADMIN: CPT | Performed by: PHYSICIAN ASSISTANT

## 2018-09-20 PROCEDURE — 73630 X-RAY EXAM OF FOOT: CPT | Mod: 26,RT | Performed by: PHYSICIAN ASSISTANT

## 2018-09-20 PROCEDURE — 99214 OFFICE O/P EST MOD 30 MIN: CPT | Mod: 25 | Performed by: PHYSICIAN ASSISTANT

## 2018-09-20 PROCEDURE — 12002 RPR S/N/AX/GEN/TRNK2.6-7.5CM: CPT | Performed by: PHYSICIAN ASSISTANT

## 2018-09-20 PROCEDURE — 90715 TDAP VACCINE 7 YRS/> IM: CPT | Performed by: PHYSICIAN ASSISTANT

## 2018-09-20 RX ORDER — CEPHALEXIN 500 MG/1
500 CAPSULE ORAL 4 TIMES DAILY
Qty: 28 CAP | Refills: 0 | Status: SHIPPED | OUTPATIENT
Start: 2018-09-20 | End: 2018-09-27

## 2018-09-20 ASSESSMENT — ENCOUNTER SYMPTOMS
FOCAL WEAKNESS: 0
SENSORY CHANGE: 0
MYALGIAS: 1
TINGLING: 0
NUMBNESS: 0
INABILITY TO BEAR WEIGHT: 0

## 2018-09-20 NOTE — PATIENT INSTRUCTIONS
Laceration Care, Adult  A laceration is a cut that goes through all of the layers of the skin and into the tissue that is right under the skin. Some lacerations heal on their own. Others need to be closed with stitches (sutures), staples, skin adhesive strips, or skin glue. Proper laceration care minimizes the risk of infection and helps the laceration to heal better.  HOW TO CARE FOR YOUR LACERATION  If sutures or staples were used:  · Keep the wound clean and dry.  · If you were given a bandage (dressing), you should change it at least one time per day or as told by your health care provider. You should also change it if it becomes wet or dirty.  · Keep the wound completely dry for the first 24 hours or as told by your health care provider. After that time, you may shower or bathe. However, make sure that the wound is not soaked in water until after the sutures or staples have been removed.  · Clean the wound one time each day or as told by your health care provider:  ¨ Wash the wound with soap and water.  ¨ Rinse the wound with water to remove all soap.  ¨ Pat the wound dry with a clean towel. Do not rub the wound.  · After cleaning the wound, apply a thin layer of antibiotic ointment as told by your health care provider. This will help to prevent infection and keep the dressing from sticking to the wound.  · Have the sutures or staples removed as told by your health care provider.  If skin adhesive strips were used:  · Keep the wound clean and dry.  · If you were given a bandage (dressing), you should change it at least one time per day or as told by your health care provider. You should also change it if it becomes dirty or wet.  · Do not get the skin adhesive strips wet. You may shower or bathe, but be careful to keep the wound dry.  · If the wound gets wet, pat it dry with a clean towel. Do not rub the wound.  · Skin adhesive strips fall off on their own. You may trim the strips as the wound heals. Do not  remove skin adhesive strips that are still stuck to the wound. They will fall off in time.  If skin glue was used:  · Try to keep the wound dry, but you may briefly wet it in the shower or bath. Do not soak the wound in water, such as by swimming.  · After you have showered or bathed, gently pat the wound dry with a clean towel. Do not rub the wound.  · Do not do any activities that will make you sweat heavily until the skin glue has fallen off on its own.  · Do not apply liquid, cream, or ointment medicine to the wound while the skin glue is in place. Using those may loosen the film before the wound has healed.  · If you were given a bandage (dressing), you should change it at least one time per day or as told by your health care provider. You should also change it if it becomes dirty or wet.  · If a dressing is placed over the wound, be careful not to apply tape directly over the skin glue. Doing that may cause the glue to be pulled off before the wound has healed.  · Do not pick at the glue. The skin glue usually remains in place for 5-10 days, then it falls off of the skin.  General Instructions  · Take over-the-counter and prescription medicines only as told by your health care provider.  · If you were prescribed an antibiotic medicine or ointment, take or apply it as told by your doctor. Do not stop using it even if your condition improves.  · To help prevent scarring, make sure to cover your wound with sunscreen whenever you are outside after stitches are removed, after adhesive strips are removed, or when glue remains in place and the wound is healed. Make sure to wear a sunscreen of at least 30 SPF.  · Do not scratch or pick at the wound.  · Keep all follow-up visits as told by your health care provider. This is important.  · Check your wound every day for signs of infection. Watch for:  ¨ Redness, swelling, or pain.  ¨ Fluid, blood, or pus.  · Raise (elevate) the injured area above the level of your heart  while you are sitting or lying down, if possible.  SEEK MEDICAL CARE IF:  · You received a tetanus shot and you have swelling, severe pain, redness, or bleeding at the injection site.  · You have a fever.  · A wound that was closed breaks open.  · You notice a bad smell coming from your wound or your dressing.  · You notice something coming out of the wound, such as wood or glass.  · Your pain is not controlled with medicine.  · You have increased redness, swelling, or pain at the site of your wound.  · You have fluid, blood, or pus coming from your wound.  · You notice a change in the color of your skin near your wound.  · You need to change the dressing frequently due to fluid, blood, or pus draining from the wound.  · You develop a new rash.  · You develop numbness around the wound.  SEEK IMMEDIATE MEDICAL CARE IF:  · You develop severe swelling around the wound.  · Your pain suddenly increases and is severe.  · You develop painful lumps near the wound or on skin that is anywhere on your body.  · You have a red streak going away from your wound.  · The wound is on your hand or foot and you cannot properly move a finger or toe.  · The wound is on your hand or foot and you notice that your fingers or toes look pale or bluish.     This information is not intended to replace advice given to you by your health care provider. Make sure you discuss any questions you have with your health care provider.     Document Released: 12/18/2006 Document Revised: 05/03/2016 Document Reviewed: 12/14/2015  [employer] Patient Education ©2016 [employer]        Foot Contusion  A foot contusion is a deep bruise to the foot. Contusions are the result of a blunt injury to tissues and muscle fibers under the skin. The injury causes bleeding under the skin. The skin overlying the contusion may turn blue, purple, or yellow. Minor injuries will give you a painless contusion, but more severe contusions may stay painful and  swollen for a few weeks.  CAUSES  This condition is usually caused by a hard hit, trauma, or direct force to your foot, such as having a heavy object fall on your foot.  SYMPTOMS  Symptoms of this condition include:  · Swelling of the foot.  · Pain and tenderness of the foot.  · Discoloration of the foot. The area may have redness and then turn blue, purple, or yellow.  DIAGNOSIS  This condition is diagnosed from a physical exam and your medical history. An X-ray may be needed to see if there are any other injuries, such as broken bones (fractures). Sometimes, a CT scan or MRI may be needed if your health care provider is concerned that you have torn or injured ligaments.  TREATMENT  In general, the best treatment for a foot contusion is rest, ice, pressure (compression), and elevation. This is often called RICE therapy. An elastic wrap may be recommended to support your foot. Over-the-counter anti-inflammatory medicines may also be recommended for pain control. If your swelling or pain is severe, you may be given crutches.  HOME CARE INSTRUCTIONS  RICE Therapy   · Rest the injured area. Try to avoid standing or walking while your foot is painful.  · If directed, apply ice to the injured area:  ¨ Put ice in a plastic bag.  ¨ Place a towel between your skin and the bag.  ¨ Leave the ice on for 20 minutes, 2-3 times per day.  · If directed, apply light compression to the injured area using an elastic wrap. Make sure the wrap is not too tight. Remove and reapply the wrap as told by your health care provider. If your toes become numb, cold, or blue, take the wrap off and reapply it more loosely.  · Raise (elevate) the injured area above the level of your heart while you are sitting or lying down.  General Instructions   · Take over-the-counter and prescription medicines only as told by your health care provider.  · Use crutches as told by your health care provider, if this applies.  · Keep all follow-up visits as told  by your health care provider. This is important.  SEEK MEDICAL CARE IF:  · Your symptoms do not improve after several days of treatment.  · You have more redness, swelling, or pain in your foot or toes.  · You have difficulty moving the injured area.  · Your swelling or pain is not relieved with medicines.  SEEK IMMEDIATE MEDICAL CARE IF:  · You have severe pain.  · Your foot or toes become numb.  · Your foot or toes become pale or cold.  · You cannot move your foot or ankle.  · Your foot is warm to the touch.  This information is not intended to replace advice given to you by your health care provider. Make sure you discuss any questions you have with your health care provider.  Document Released: 10/09/2007 Document Revised: 04/10/2017 Document Reviewed: 08/23/2016  [employer] Interactive Patient Education © 2017 [employer]

## 2018-09-20 NOTE — PROGRESS NOTES
Subjective:      Madhavi Hoyos is a 58 y.o. female who presents with Leg Injury (puncture on the back of the R leg, R foot injury, swelling, slight bloody dishcharge, onset 6am today )    PMH:  has a past medical history of Allergic rhinitis (3/5/2013); Chronic rhinitis (3/5/2013); and Indigestion.  MEDS:   Current Outpatient Prescriptions:   •  fluticasone (FLONASE) 50 MCG/ACT nasal spray, Spray 2 Sprays in nose every day., Disp: 16 g, Rfl: 11  •  azelastine (ASTELIN) 137 MCG/SPRAY nasal spray, Spray 1 Spray in nose 2 times a day., Disp: 30 mL, Rfl: 11  •  loratadine (CLARITIN) 10 MG Tab, Take 10 mg by mouth every day., Disp: , Rfl:   ALLERGIES:   Allergies   Allergen Reactions   • Zithromax [Azithromycin Dihydrate] Rash     SURGHX:   Past Surgical History:   Procedure Laterality Date   • OTHER  2004?    breast implants   • PB ENLARGE BREAST WITH IMPLANT     • TONSILLECTOMY       SOCHX:  reports that she has never smoked. She has never used smokeless tobacco. She reports that she does not drink alcohol or use drugs.  FH: Reviewed with patient/family. Not pertinent to this complaint.          Patient presents with:  Leg Injury: puncture on the back of the R leg, R foot injury, swelling, slight bloody dishcharge, onset 6am today     PT states she was walking to her car in the garage with the light off, tripped over an old TV that was on the floor.  Pt states she struck her foot and fell, cut the back of her right thigh.  Pt states she hit her head lightly but denies LOC or headache.  Pt main concern is her laceration and foot.  Pt denies any other complaint.       Leg Injury    The incident occurred 1 to 3 hours ago. The incident occurred at home. The injury mechanism was a fall. The pain is present in the right thigh and right foot. The quality of the pain is described as burning, aching and stabbing. The pain is at a severity of 7/10. The pain is moderate. The pain has been constant since onset.  "Pertinent negatives include no inability to bear weight, numbness or tingling. She reports no foreign bodies present. The symptoms are aggravated by movement, palpation and weight bearing. She has tried elevation, ice, immobilization and rest for the symptoms. The treatment provided mild relief.       Review of Systems   Musculoskeletal: Positive for myalgias.   Neurological: Negative for tingling, sensory change, focal weakness and numbness.   All other systems reviewed and are negative.         Objective:     /68 (BP Location: Right arm, Patient Position: Sitting, BP Cuff Size: Adult)   Pulse 78   Temp 37.8 °C (100 °F) (Temporal)   Resp 12   Ht 1.676 m (5' 6\")   Wt 62.1 kg (136 lb 12.8 oz)   SpO2 96%   BMI 22.08 kg/m²      Physical Exam   Constitutional: She is oriented to person, place, and time. She appears well-developed and well-nourished. No distress.   HENT:   Head: Normocephalic and atraumatic.   Nose: Nose normal.   Eyes: Pupils are equal, round, and reactive to light. Conjunctivae and EOM are normal.   Neck: Normal range of motion. Neck supple.   Cardiovascular: Normal rate and intact distal pulses.    Pulmonary/Chest: Effort normal.   Musculoskeletal:        Legs:       Right foot: There is tenderness, bony tenderness and swelling. There is normal capillary refill and no crepitus.        Feet:    Neurological: She is alert and oriented to person, place, and time.   Skin: Skin is warm and dry. Capillary refill takes less than 2 seconds.   Psychiatric: She has a normal mood and affect.   Nursing note and vitals reviewed.         Procedure: Laceration Repair  -Risks including bleeding, nerve damage, infection, and poor cosmetic outcome discussed at length. Benefits and alternatives discussed.   -Sterile technique throughout  -Local anesthesia with 2% lidocaine  -Closed with # 5, 4 -0 Nylon interrupted sutures with good wound approximation  -Polysporin and dressing placed  -Patient tolerated " well      Xray images viewed and interpreted by me, confirmed by radiology:    Impression       1.  No fracture or dislocation of RIGHT foot.  2.  Mild dorsal soft tissue swelling.  3.  Hallux valgus with bunion formation.   Reading Provider Reading Date   Tee Tubbs M.D. Sep 20, 2018   Signing Provider Signing Date Signing Time   Tee Tubbs M.D. Sep 20, 2018  9:47 A          Assessment/Plan:     1. Contusion of right foot, initial encounter  DX-FOOT-COMPLETE 3+ RIGHT    Tdap =>6yo IM    cephALEXin (KEFLEX) 500 MG Cap   2. Laceration of right thigh, initial encounter  Tdap =>6yo IM    cephALEXin (KEFLEX) 500 MG Cap   3. Fall, initial encounter  Tdap =>5yo IM    cephALEXin (KEFLEX) 500 MG Cap       PT will return in 10-14 days for suture removal.     RICE TREATMENT FOR EXTREMITY INJURIES:  R-rest the extremity as much as possible while pain and swelling persist  I-ice the extremity 15 minutes every 2 hours for the first 24 hours, then 4-5 times daily   C-compress the extremity either with splint or ace wrap as directed  E-elevate the extremity to help with swelling    PT states she gets skin infections quite easily.  Contingent antibiotic prescription given to patient to fill upon meeting criteria of guidelines discussed.       PT should follow up with PCP in 1-2 days for re-evaluation if symptoms have not improved.  Discussed red flags and reasons to return to UC or ED.  Pt and/or family verbalized understanding of diagnosis and follow up instructions and was offered informational handout on diagnosis.  PT discharged.

## 2018-09-27 ENCOUNTER — OFFICE VISIT (OUTPATIENT)
Dept: URGENT CARE | Facility: PHYSICIAN GROUP | Age: 58
End: 2018-09-27
Payer: COMMERCIAL

## 2018-09-27 VITALS
RESPIRATION RATE: 16 BRPM | HEART RATE: 70 BPM | BODY MASS INDEX: 22.11 KG/M2 | WEIGHT: 137.6 LBS | TEMPERATURE: 99.3 F | OXYGEN SATURATION: 96 % | SYSTOLIC BLOOD PRESSURE: 110 MMHG | DIASTOLIC BLOOD PRESSURE: 70 MMHG | HEIGHT: 66 IN

## 2018-09-27 DIAGNOSIS — Z51.89 VISIT FOR WOUND CHECK: ICD-10-CM

## 2018-09-27 PROCEDURE — 99024 POSTOP FOLLOW-UP VISIT: CPT | Performed by: FAMILY MEDICINE

## 2018-09-27 NOTE — PROGRESS NOTES
Presents for wound check 1 week after laceration repair of right posterior thigh as well as open wound on right dorsal foot.  She does continue to have some pain particularly in the foot with a clicking sensation when she walks.  She has not tried any over-the-counter treatments.  She was given a contingent antibiotic and has not used that.  There is no expanding redness, warmth, or discharge.    Wounds:  Right thigh: Laceration is clean dry and intact.  Interrupted sutures are in place.  There is no expanding redness or warmth.    Right foot: Eschar dorsal aspect with surrounding soft tissue swelling.  No fluctuance.  No drainage.  No expanding redness.  Pain with movement of ankle and toes but no crepitus palpated.    She has no swelling of her leg and no cords palpated.    A/P  Wound check doing well.  Follow-up in 3 days for suture removal.

## 2018-10-01 ENCOUNTER — OFFICE VISIT (OUTPATIENT)
Dept: URGENT CARE | Facility: PHYSICIAN GROUP | Age: 58
End: 2018-10-01
Payer: COMMERCIAL

## 2018-10-01 VITALS
BODY MASS INDEX: 22.02 KG/M2 | TEMPERATURE: 98.7 F | DIASTOLIC BLOOD PRESSURE: 70 MMHG | HEIGHT: 66 IN | WEIGHT: 137 LBS | RESPIRATION RATE: 18 BRPM | OXYGEN SATURATION: 97 % | SYSTOLIC BLOOD PRESSURE: 122 MMHG | HEART RATE: 74 BPM

## 2018-10-01 DIAGNOSIS — Z48.02 VISIT FOR SUTURE REMOVAL: ICD-10-CM

## 2018-10-01 PROCEDURE — 99212 OFFICE O/P EST SF 10 MIN: CPT | Performed by: PHYSICIAN ASSISTANT

## 2018-10-01 NOTE — PROGRESS NOTES
HPI:   Presents for suture removal 11 days post procedure. Laceration without pain, discharge or redness.     ROS:   no fever, no sensory loss, no weakness    Exam:   Gen: WDWN in no distress  Wound: well healing laceration. 5 interrupted sutures removed without difficulty. No evidence of dehiscence or infection.  Neuro: sensory/motor intact     A/P   Laceration  Suture Removal  F/U prn

## 2018-10-18 ENCOUNTER — NON-PROVIDER VISIT (OUTPATIENT)
Dept: OCCUPATIONAL MEDICINE | Facility: CLINIC | Age: 58
End: 2018-10-18

## 2018-10-18 DIAGNOSIS — Z23 NEED FOR VACCINATION: Primary | ICD-10-CM

## 2018-10-18 PROCEDURE — 90686 IIV4 VACC NO PRSV 0.5 ML IM: CPT | Performed by: PREVENTIVE MEDICINE

## 2018-12-11 ENCOUNTER — OFFICE VISIT (OUTPATIENT)
Dept: MEDICAL GROUP | Facility: MEDICAL CENTER | Age: 58
End: 2018-12-11
Payer: COMMERCIAL

## 2018-12-11 VITALS
HEIGHT: 66 IN | WEIGHT: 143 LBS | TEMPERATURE: 98.4 F | DIASTOLIC BLOOD PRESSURE: 72 MMHG | RESPIRATION RATE: 16 BRPM | SYSTOLIC BLOOD PRESSURE: 118 MMHG | OXYGEN SATURATION: 96 % | HEART RATE: 85 BPM | BODY MASS INDEX: 22.98 KG/M2

## 2018-12-11 DIAGNOSIS — M79.672 LEFT FOOT PAIN: ICD-10-CM

## 2018-12-11 DIAGNOSIS — M25.511 ACUTE PAIN OF BOTH SHOULDERS: ICD-10-CM

## 2018-12-11 DIAGNOSIS — L29.9 PRURITUS: ICD-10-CM

## 2018-12-11 DIAGNOSIS — M25.512 ACUTE PAIN OF BOTH SHOULDERS: ICD-10-CM

## 2018-12-11 DIAGNOSIS — Z12.11 SCREEN FOR COLON CANCER: ICD-10-CM

## 2018-12-11 PROCEDURE — 99214 OFFICE O/P EST MOD 30 MIN: CPT | Performed by: NURSE PRACTITIONER

## 2018-12-11 NOTE — PROGRESS NOTES
Subjective:      Madhavi Hoyos is a 58 y.o. female who presents with Foot Pain        CC: Patient is here today for complaints of foot pain, shoulder pain and pruritus of the ear canals.    HPI Madhavi Hoyos      1. Left foot pain  Patient reports her symptoms started around September when she dropped an object on her feet bilaterally.  Her main concern is left foot pain since then.  It occurs in the arch and anterior portion of the foot.  She also has had problems with her right foot in the past and has seen podiatry for this.  She states she was told she should get hallux valgus surgery but has been holding off on this.    2. Acute pain of both shoulders  Patient also reports pain in her upper arms bilaterally.  She states this also started around September and possibly secondary to heavy lifting.  There is no pain at rest but when she elevates her arms bilaterally she feels stiff and there is sometimes discomfort.  She denies redness or swelling and points to the areas just above her bicep area as to where the pain occurs.    3. Pruritus  Patient reports this is a problem for many years and she has been to ENT but nothing could be found.  It is mostly in itching that she feels in her ear canals which come and go.  She has not used anything for symptoms.    4. Screen for colon cancer  Patient states she cannot do colonoscopy because she has difficulty with fasting but does do yearly FIT testing.  Current Outpatient Prescriptions   Medication Sig Dispense Refill   • hydrocortisone 2.5 % Cream topical cream Apply 1 Application to affected area(s) 2 times a day. 1 Tube 1   • fluticasone (FLONASE) 50 MCG/ACT nasal spray Spray 2 Sprays in nose every day. 16 g 11   • azelastine (ASTELIN) 137 MCG/SPRAY nasal spray [place] 1 Spray in nose 2 times a day. 30 mL 11   • loratadine (CLARITIN) 10 MG Tab Take 10 mg by mouth every day.       No current facility-administered medications for this visit.   "    Social History   Substance Use Topics   • Smoking status: Never Smoker   • Smokeless tobacco: Never Used   • Alcohol use No     Family History   Problem Relation Age of Onset   • Diabetes Mother    • Heart Disease Mother         cad   • Cancer Father 44        stomach   • Cancer Maternal Aunt         colon   • Diabetes Maternal Grandmother    • Cancer Maternal Grandmother         breast   • Breast Cancer Maternal Grandmother    • Hypertension Neg Hx    • Hyperlipidemia Neg Hx    • Stroke Neg Hx      Past Medical History:   Diagnosis Date   • Allergic rhinitis 3/5/2013   • Chronic rhinitis 3/5/2013   • Indigestion        Review of Systems   Musculoskeletal: Positive for joint pain.   Skin: Positive for itching.   All other systems reviewed and are negative.         Objective:     /72 (BP Location: Right arm, Patient Position: Sitting, BP Cuff Size: Adult)   Pulse 85   Temp 36.9 °C (98.4 °F) (Temporal)   Resp 16   Ht 1.676 m (5' 6\")   Wt 64.9 kg (143 lb)   SpO2 96%   BMI 23.08 kg/m²      Physical Exam   Constitutional: She is oriented to person, place, and time. She appears well-developed and well-nourished. No distress.   HENT:   Head: Normocephalic and atraumatic.   Right Ear: External ear normal.   Left Ear: External ear normal.   Nose: Nose normal.   Ear canals and tympanic membranes appear benign.   Eyes: Right eye exhibits no discharge. Left eye exhibits no discharge.   Neck: Normal range of motion. Neck supple. No thyromegaly present.   Cardiovascular: Normal rate, regular rhythm and normal heart sounds.  Exam reveals no gallop and no friction rub.    No murmur heard.  Pulmonary/Chest: Effort normal and breath sounds normal. She has no wheezes. She has no rales.   Musculoskeletal: She exhibits no edema or tenderness.   Patient able to fully elevate her arms bilaterally but she does report some stiffness in the shoulder joints bilaterally.  There is no swelling or redness of the feet but pain is " reported on the lateral aspect and anterior portion of the left foot.   Neurological: She is alert and oriented to person, place, and time. She displays normal reflexes.   Skin: Skin is warm and dry. No rash noted. She is not diaphoretic.   Psychiatric: She has a normal mood and affect. Her behavior is normal. Judgment and thought content normal.   Nursing note and vitals reviewed.              Assessment/Plan:     1. Left foot pain  I will get an x-ray of the left foot to check for fracture but since this is a chronic problem I will refer her back to Dr. Medrano who she has seen in the past for other foot related problems.  She should rest the foot and wear good support shoes.  - DX-FOOT-COMPLETE 3+ LEFT; Future  - REFERRAL TO PODIATRY    2. Acute pain of both shoulders  I will have patient first try physical therapy and if it does not help I will consider referral to orthopedics and MRI.  We spoke about using Tylenol as needed for pain and to be careful about using ibuprofen or Aleve long-term.  - REFERRAL TO PHYSICAL THERAPY Reason for Therapy: Eval/Treat/Report    3. Pruritus  Patient may use a small amount of hydrocortisone on a Q-tip to the outside ear canal for only about 3 days in a row to help with the itching.  - hydrocortisone 2.5 % Cream topical cream; Apply 1 Application to affected area(s) 2 times a day.  Dispense: 1 Tube; Refill: 1    4. Screen for colon cancer  Patient declines going for colonoscopy.  - OCCULT BLOOD FECES IMMUNOASSAY; Future

## 2018-12-12 ENCOUNTER — HOSPITAL ENCOUNTER (OUTPATIENT)
Facility: MEDICAL CENTER | Age: 58
End: 2018-12-12
Attending: NURSE PRACTITIONER
Payer: COMMERCIAL

## 2018-12-12 DIAGNOSIS — Z12.11 SCREEN FOR COLON CANCER: ICD-10-CM

## 2018-12-12 PROCEDURE — 82274 ASSAY TEST FOR BLOOD FECAL: CPT

## 2018-12-13 LAB — HEMOCCULT STL QL IA: NEGATIVE

## 2018-12-17 ENCOUNTER — HOSPITAL ENCOUNTER (OUTPATIENT)
Dept: RADIOLOGY | Facility: MEDICAL CENTER | Age: 58
End: 2018-12-17
Attending: NURSE PRACTITIONER
Payer: COMMERCIAL

## 2018-12-17 DIAGNOSIS — M79.672 LEFT FOOT PAIN: ICD-10-CM

## 2018-12-17 PROCEDURE — 73630 X-RAY EXAM OF FOOT: CPT | Mod: LT

## 2018-12-20 ENCOUNTER — OFFICE VISIT (OUTPATIENT)
Dept: URGENT CARE | Facility: PHYSICIAN GROUP | Age: 58
End: 2018-12-20
Payer: COMMERCIAL

## 2018-12-20 VITALS
RESPIRATION RATE: 16 BRPM | SYSTOLIC BLOOD PRESSURE: 116 MMHG | HEIGHT: 66 IN | TEMPERATURE: 98.2 F | HEART RATE: 84 BPM | DIASTOLIC BLOOD PRESSURE: 64 MMHG | OXYGEN SATURATION: 97 % | WEIGHT: 141 LBS | BODY MASS INDEX: 22.66 KG/M2

## 2018-12-20 DIAGNOSIS — R09.A2 GLOBUS SENSATION: ICD-10-CM

## 2018-12-20 DIAGNOSIS — R13.10 DYSPHAGIA, UNSPECIFIED TYPE: ICD-10-CM

## 2018-12-20 PROCEDURE — 99214 OFFICE O/P EST MOD 30 MIN: CPT | Performed by: PHYSICIAN ASSISTANT

## 2018-12-20 RX ORDER — PANTOPRAZOLE SODIUM 40 MG/1
40 TABLET, DELAYED RELEASE ORAL DAILY
Qty: 14 TAB | Refills: 0 | Status: SHIPPED | OUTPATIENT
Start: 2018-12-20 | End: 2019-01-03

## 2018-12-20 NOTE — PROGRESS NOTES
Chief Complaint   Patient presents with   • Other     pt stated pills feel like they are stuck in throat , happened yesterday        HISTORY OF PRESENT ILLNESS: Patient is a 58 y.o. female who presents today for about 24 hours of sensation of pills stuck in throat.  Patient states she took two large fish oil capsules yesterday and after swallowing the second one felt it got stuck in her throat.  The symptom persisted throughout the day and she admits it made her very anxious.  She went to bed and woke thinking she was feeling better but the symptom returned.  She can have liquids but having hard time with solids.  When she swallows a solid she feels it gets stuck.  Had nausea yesterday but no vomiting.  No abdominal pain.  SOB or difficulty breathing.  Patient states she has never been formally dx with GERD or reflux but suspects she has this.  Does not take anything for this.     Patient Active Problem List    Diagnosis Date Noted   • Dysfunction of left eustachian tube 06/11/2018   • Allergic rhinitis 03/05/2013       Allergies:Zithromax [azithromycin dihydrate]    Current Outpatient Prescriptions Ordered in [facility]   Medication Sig Dispense Refill   • pantoprazole (PROTONIX) 40 MG Tablet Delayed Response Take 1 Tab by mouth every day for 14 days. 14 Tab 0   • hydrocortisone 2.5 % Cream topical cream Apply 1 Application to affected area(s) 2 times a day. 1 Tube 1   • fluticasone (FLONASE) 50 MCG/ACT nasal spray Spray 2 Sprays in nose every day. 16 g 11   • azelastine (ASTELIN) 137 MCG/SPRAY nasal spray [place] 1 Spray in nose 2 times a day. 30 mL 11   • loratadine (CLARITIN) 10 MG Tab Take 10 mg by mouth every day.       No current [facility]-ordered facility-administered medications on file.        Past Medical History:   Diagnosis Date   • Allergic rhinitis 3/5/2013   • Chronic rhinitis 3/5/2013   • Indigestion        Social History   Substance Use Topics   • Smoking status: Never Smoker   • Smokeless tobacco: Never Used  "  • Alcohol use No       Family Status   Relation Status   • Mo    • Fa    • MAunt (Not Specified)   • MGMo    • Neg Hx (Not Specified)     Family History   Problem Relation Age of Onset   • Diabetes Mother    • Heart Disease Mother         cad   • Cancer Father 44        stomach   • Cancer Maternal Aunt         colon   • Diabetes Maternal Grandmother    • Cancer Maternal Grandmother         breast   • Breast Cancer Maternal Grandmother    • Hypertension Neg Hx    • Hyperlipidemia Neg Hx    • Stroke Neg Hx        ROS:  Review of Systems   Constitutional: Negative for fever, chills, weight loss and malaise/fatigue.   HENT: SEE HPI  Eyes: Negative for blurred vision.   Respiratory: Negative for cough, sputum production, shortness of breath and wheezing.    Cardiovascular: Negative for chest pain, palpitations, orthopnea and leg swelling.   Gastrointestinal: SEE HPI  Genitourinary: Negative for dysuria, urgency and frequency.     Exam:  Blood pressure 116/64, pulse 84, temperature 36.8 °C (98.2 °F), temperature source Temporal, resp. rate 16, height 1.676 m (5' 6\"), weight 64 kg (141 lb), last menstrual period 2015, SpO2 97 %, not currently breastfeeding.  General:  Well nourished, well developed female in NAD  Eyes: PERRLA, EOM within normal limits, no conjunctival injection, no scleral icterus, visual fields and acuity grossly intact.  Ears: Normal shape and symmetry, no tenderness, no discharge. External canals are without any significant edema or erythema. Tympanic membranes are without any inflammation, no effusion. Gross auditory acuity is intact  Nose: Symmetrical, sinuses without tenderness, no discharge.   Mouth: reasonable hygiene, no erythema exudates or tonsillar enlargement.  Neck: no masses, range of motion within normal limits, no tracheal deviation. No lymphadenopathy.  No stridor.   Pulmonary: Normal respiratory effort, no wheezes, crackles, or rhonchi.  Cardiovascular: " regular rate and rhythm without murmurs, rubs, or gallops.  Abdomen: Soft, nontender, nondistended. Normal bowel sounds. No hepatosplenomegaly or masses, or hernias. No rebound or guarding.  Skin: No visible rashes or lesion. Warm, pink, dry.   Extremities: no clubbing, cyanosis, or edema.  Neuro: A&O x 3. Speech normal/clear.  Normal gait.       Assessment/Plan:  1. Globus sensation  REFERRAL TO GASTROENTEROLOGY   2. Dysphagia, unspecified type  REFERRAL TO GASTROENTEROLOGY    pantoprazole (PROTONIX) 40 MG Tablet Delayed Response        -new globus sensation and solid food dysphagia x 24 hours.    -dicussed that I do not have any ability to visualize esophagus from an UC setting.     -patient will trial PPI, bland/smooth/soft diet as tolerated, hydrate and urgent referral to GI.   -ER precautions for worsening dysphagia, vomiting or new concerns discussed.       Supportive care, differential diagnoses, and indications for immediate follow-up discussed with patient.   Pathogenesis of diagnosis discussed including typical length and natural progression.   Instructed to return to clinic or nearest emergency department for any change in condition, further concerns, or worsening of symptoms.  Patient states understanding of the plan of care and discharge instructions.      Ashley Hart P.A.-C.

## 2018-12-26 ENCOUNTER — OFFICE VISIT (OUTPATIENT)
Dept: URGENT CARE | Facility: PHYSICIAN GROUP | Age: 58
End: 2018-12-26
Payer: COMMERCIAL

## 2018-12-26 VITALS
DIASTOLIC BLOOD PRESSURE: 82 MMHG | BODY MASS INDEX: 22.34 KG/M2 | HEART RATE: 86 BPM | HEIGHT: 66 IN | RESPIRATION RATE: 16 BRPM | TEMPERATURE: 98.5 F | WEIGHT: 139 LBS | SYSTOLIC BLOOD PRESSURE: 130 MMHG | OXYGEN SATURATION: 98 %

## 2018-12-26 DIAGNOSIS — H60.332 ACUTE SWIMMER'S EAR OF LEFT SIDE: Primary | ICD-10-CM

## 2018-12-26 PROCEDURE — 99214 OFFICE O/P EST MOD 30 MIN: CPT | Performed by: PHYSICIAN ASSISTANT

## 2018-12-26 RX ORDER — CIPROFLOXACIN AND DEXAMETHASONE 3; 1 MG/ML; MG/ML
4 SUSPENSION/ DROPS AURICULAR (OTIC) 2 TIMES DAILY
Qty: 1 BOTTLE | Refills: 0 | Status: SHIPPED | OUTPATIENT
Start: 2018-12-26 | End: 2018-12-26

## 2018-12-26 RX ORDER — OFLOXACIN 3 MG/ML
5 SOLUTION AURICULAR (OTIC) DAILY
Qty: 10 ML | Refills: 0 | Status: SHIPPED | OUTPATIENT
Start: 2018-12-26 | End: 2019-01-14 | Stop reason: SDUPTHER

## 2018-12-26 ASSESSMENT — ENCOUNTER SYMPTOMS
COUGH: 0
CONSTIPATION: 0
HEADACHES: 0
DIARRHEA: 0
CHILLS: 0
NAUSEA: 0
RHINORRHEA: 0
VOMITING: 0
SORE THROAT: 0
ABDOMINAL PAIN: 0
FEVER: 0
SINUS PAIN: 0
SHORTNESS OF BREATH: 0

## 2018-12-26 ASSESSMENT — PAIN SCALES - GENERAL: PAINLEVEL: 3=SLIGHT PAIN

## 2018-12-26 NOTE — PATIENT INSTRUCTIONS
"Otitis Externa  Otitis externa is a germ infection in the outer ear. The outer ear is the area from the eardrum to the outside of the ear. Otitis externa is sometimes called \"swimmer's ear.\"  HOME CARE  · Put drops in the ear as told by your doctor.  · Only take medicine as told by your doctor.  · If you have diabetes, your doctor may give you more directions. Follow your doctor's directions.  · Keep all doctor visits as told.  To avoid another infection:  · Keep your ear dry. Use the corner of a towel to dry your ear after swimming or bathing.  · Avoid scratching or putting things inside your ear.  · Avoid swimming in lakes, dirty water, or pools that use a chemical called chlorine poorly.  · You may use ear drops after swimming. Combine equal amounts of white vinegar and alcohol in a bottle. Put 3 or 4 drops in each ear.  GET HELP IF:   · You have a fever.  · Your ear is still red, puffy (swollen), or painful after 3 days.  · You still have yellowish-white fluid (pus) coming from the ear after 3 days.  · Your redness, puffiness, or pain gets worse.  · You have a really bad headache.  · You have redness, puffiness, pain, or tenderness behind your ear.  MAKE SURE YOU:   · Understand these instructions.  · Will watch your condition.  · Will get help right away if you are not doing well or get worse.  This information is not intended to replace advice given to you by your health care provider. Make sure you discuss any questions you have with your health care provider.  Document Released: 06/05/2009 Document Revised: 01/08/2016 Document Reviewed: 09/26/2016  [employer] Interactive Patient Education © 2017 [employer]    "

## 2018-12-26 NOTE — PROGRESS NOTES
Subjective:   Madhavi Hoyos is a 58 y.o. female who presents for Otalgia (began last night)        Otalgia    There is pain in the left ear. This is a new problem. The current episode started yesterday. The problem occurs constantly. Pertinent negatives include no abdominal pain, coughing, diarrhea, ear discharge, headaches, hearing loss, rhinorrhea, sore throat or vomiting. She has tried nothing for the symptoms. Her past medical history is significant for a chronic ear infection (Otitis externa). There is no history of hearing loss or a tympanostomy tube.     Pt still having globus sensation. She has GI appointment scheduled for 1/11/18. She is currently taking pantoprazole but having difficulty swallowing pills.     Review of Systems   Constitutional: Negative for chills, fever and malaise/fatigue.   HENT: Positive for ear pain. Negative for congestion, ear discharge, hearing loss, rhinorrhea, sinus pain and sore throat.    Respiratory: Negative for cough and shortness of breath.    Gastrointestinal: Negative for abdominal pain, constipation, diarrhea, nausea and vomiting.   Neurological: Negative for headaches.   All other systems reviewed and are negative.      PMH:  has a past medical history of Allergic rhinitis (3/5/2013); Chronic rhinitis (3/5/2013); and Indigestion.  MEDS:   Current Outpatient Prescriptions:   •  ciprofloxacin/dexamethasone (CIPRODEX) 0.3-0.1 % Suspension, Place 4 Drops in left ear 2 times a day for 5 days., Disp: 1 Bottle, Rfl: 0  •  pantoprazole (PROTONIX) 40 MG Tablet Delayed Response, Take 1 Tab by mouth every day for 14 days., Disp: 14 Tab, Rfl: 0  •  fluticasone (FLONASE) 50 MCG/ACT nasal spray, Spray 2 Sprays in nose every day., Disp: 16 g, Rfl: 11  •  loratadine (CLARITIN) 10 MG Tab, Take 10 mg by mouth every day., Disp: , Rfl:   ALLERGIES:   Allergies   Allergen Reactions   • Zithromax [Azithromycin Dihydrate] Rash     SURGHX:   Past Surgical History:   Procedure  "Laterality Date   • OTHER  2004?    breast implants   • PB ENLARGE BREAST WITH IMPLANT     • TONSILLECTOMY       SOCHX:  reports that she has never smoked. She has never used smokeless tobacco. She reports that she does not drink alcohol or use drugs.  Family History   Problem Relation Age of Onset   • Diabetes Mother    • Heart Disease Mother         cad   • Cancer Father 44        stomach   • Cancer Maternal Aunt         colon   • Diabetes Maternal Grandmother    • Cancer Maternal Grandmother         breast   • Breast Cancer Maternal Grandmother    • Hypertension Neg Hx    • Hyperlipidemia Neg Hx    • Stroke Neg Hx         Objective:   /82 (BP Location: Right arm, Patient Position: Sitting, BP Cuff Size: Adult)   Pulse 86   Temp 36.9 °C (98.5 °F) (Temporal)   Resp 16   Ht 1.676 m (5' 6\")   Wt 63 kg (139 lb)   LMP 03/09/2015   SpO2 98%   BMI 22.44 kg/m²     Physical Exam   Constitutional: She is oriented to person, place, and time. She appears well-developed and well-nourished. No distress.   HENT:   Head: Normocephalic and atraumatic.   Right Ear: Hearing, tympanic membrane, external ear and ear canal normal. No swelling or tenderness. Tympanic membrane is not erythematous and not retracted.   Left Ear: There is swelling and tenderness. No mastoid tenderness. Tympanic membrane is not erythematous and not retracted.  No middle ear effusion.   Mouth/Throat: No oropharyngeal exudate, posterior oropharyngeal edema or posterior oropharyngeal erythema.   Eyes: Pupils are equal, round, and reactive to light. Conjunctivae are normal.   Cardiovascular: Normal rate and regular rhythm.    Pulmonary/Chest: Effort normal and breath sounds normal.   Neurological: She is alert and oriented to person, place, and time.   Skin: Skin is warm and dry.   Psychiatric: She has a normal mood and affect. Her behavior is normal.   Vitals reviewed.        Assessment/Plan:     1. Acute swimmer's ear of left side  " ciprofloxacin/dexamethasone (CIPRODEX) 0.3-0.1 % Suspension     Patient directed to take full course of abx. Supportive care reviewed. Consider swimmers ear drying drops after submerging ears. Otitis externa educational handout given to patient.     Follow-up with primary care provider within 7-10 days.  If symptoms worsen or persist patient can contact me or return to clinic for follow-up.  Red flags and emergency room precautions discussed.  Patient appears understanding of information.

## 2018-12-28 ENCOUNTER — APPOINTMENT (OUTPATIENT)
Dept: PHYSICAL THERAPY | Facility: REHABILITATION | Age: 58
End: 2018-12-28
Attending: NURSE PRACTITIONER
Payer: COMMERCIAL

## 2019-01-12 ENCOUNTER — TELEMEDICINE2 (OUTPATIENT)
Dept: URGENT CARE | Facility: PHYSICIAN GROUP | Age: 59
End: 2019-01-12
Payer: COMMERCIAL

## 2019-01-12 DIAGNOSIS — J01.01 ACUTE RECURRENT MAXILLARY SINUSITIS: ICD-10-CM

## 2019-01-12 PROCEDURE — 99214 OFFICE O/P EST MOD 30 MIN: CPT | Performed by: PHYSICIAN ASSISTANT

## 2019-01-12 RX ORDER — AMOXICILLIN AND CLAVULANATE POTASSIUM 875; 125 MG/1; MG/1
1 TABLET, FILM COATED ORAL 2 TIMES DAILY
Qty: 20 TAB | Refills: 0 | Status: SHIPPED | OUTPATIENT
Start: 2019-01-12 | End: 2019-03-27

## 2019-01-12 ASSESSMENT — ENCOUNTER SYMPTOMS
SINUS PRESSURE: 1
SPUTUM PRODUCTION: 0
DIZZINESS: 0
DIARRHEA: 0
SHORTNESS OF BREATH: 0
ABDOMINAL PAIN: 0
CHILLS: 0
FEVER: 0
SWOLLEN GLANDS: 1
COUGH: 1
SORE THROAT: 1
SINUS PAIN: 1
CARDIOVASCULAR NEGATIVE: 1
WHEEZING: 0
HEADACHES: 1
VOMITING: 0
NAUSEA: 1
MUSCULOSKELETAL NEGATIVE: 1

## 2019-01-13 NOTE — PROGRESS NOTES
Subjective:      Madhavi Hoyos is a 58 y.o. female who presents with Sinus Problem      Encounter was completed using secure and encrypted videoconferencing equipment, the patient gave consent, and patient identification was confirmed            Sinus Problem   This is a new problem. The current episode started in the past 7 days. The problem has been gradually worsening since onset. There has been no fever. The fever has been present for less than 1 day. Associated symptoms include congestion, coughing, ear pain, headaches, sinus pressure, a sore throat and swollen glands. Pertinent negatives include no chills or shortness of breath. Past treatments include spray decongestants and acetaminophen. The treatment provided mild relief.       PMH:  has a past medical history of Allergic rhinitis (3/5/2013); Chronic rhinitis (3/5/2013); and Indigestion.  MEDS:   Current Outpatient Prescriptions:   •  fluticasone (FLONASE) 50 MCG/ACT nasal spray, Spray 2 Sprays in nose every day., Disp: 16 g, Rfl: 11  •  loratadine (CLARITIN) 10 MG Tab, Take 10 mg by mouth every day., Disp: , Rfl:   ALLERGIES:   Allergies   Allergen Reactions   • Zithromax [Azithromycin Dihydrate] Rash     SURGHX:   Past Surgical History:   Procedure Laterality Date   • OTHER  2004?    breast implants   • PB ENLARGE BREAST WITH IMPLANT     • TONSILLECTOMY       SOCHX:  reports that she has never smoked. She has never used smokeless tobacco. She reports that she does not drink alcohol or use drugs.  FH: family history includes Breast Cancer in her maternal grandmother; Cancer in her maternal aunt and maternal grandmother; Cancer (age of onset: 44) in her father; Diabetes in her maternal grandmother and mother; Heart Disease in her mother.    Review of Systems   Constitutional: Negative for chills and fever.   HENT: Positive for congestion, ear pain, sinus pain, sinus pressure and sore throat.    Respiratory: Positive for cough. Negative for  sputum production, shortness of breath and wheezing.    Cardiovascular: Negative.    Gastrointestinal: Positive for nausea. Negative for abdominal pain, diarrhea and vomiting.   Musculoskeletal: Negative.    Neurological: Positive for headaches. Negative for dizziness.       Medications, Allergies, and current problem list reviewed today in Epic     Objective:     LMP 03/09/2015      Physical Exam   Constitutional: She is oriented to person, place, and time. She appears well-developed and well-nourished.   HENT:   Head: Normocephalic and atraumatic.   Right Ear: Hearing and external ear normal. No decreased hearing is noted.   Left Ear: Hearing and external ear normal. No decreased hearing is noted.   Facial pressure when patient pushes on maxillary sinus and facial pressure with leaning forward   Eyes: Conjunctivae are normal. Right eye exhibits no discharge. Left eye exhibits no discharge.   Neck: Normal range of motion. Neck supple.   Pulmonary/Chest: Effort normal.   Neurological: She is alert and oriented to person, place, and time.   Psychiatric: She has a normal mood and affect. Her behavior is normal. Judgment and thought content normal.   Nursing note reviewed.              Assessment/Plan:     1. Acute recurrent maxillary sinusitis  amoxicillin-clavulanate (AUGMENTIN) 875-125 MG Tab     Treating patient based on symptoms.  It is difficult to evaluate over video.  She will attempt medication over-the-counter meds as discussed.  If no improvement she will be seen in clinic.  Take full course of antibiotic  Tylenol and Motrin for fever and pain  OTC meds as discussed including oral decongestant if tolerated  Increase fluids and rest  Nasal spray, nasal wash, Nasreen pot    Return to clinic or go to ED if symptoms worsen or persist. Indications for ED discussed at length. Patient voices understanding. Follow-up with your primary care provider in 3-5 days. Red flags discussed. All side effects of medication  discussed including allergic response, GI upset, tendon injury, etc.    Please note that this dictation was created using voice recognition software. I have made every reasonable attempt to correct obvious errors, but I expect that there are errors of grammar and possibly content that I did not discover before finalizing the note.

## 2019-01-14 ENCOUNTER — APPOINTMENT (OUTPATIENT)
Dept: PHYSICAL THERAPY | Facility: REHABILITATION | Age: 59
End: 2019-01-14
Payer: COMMERCIAL

## 2019-01-14 DIAGNOSIS — H60.332 ACUTE SWIMMER'S EAR OF LEFT SIDE: ICD-10-CM

## 2019-01-14 NOTE — TELEPHONE ENCOUNTER
1. Name: Madhavi      Call Back Number: [phone] (home) [phone] (work)     Patient approves a detailed voicemail message: yes    2. Which medication(s) is being requested? Floxin    3. What is the preferred [facility]? [facility]    Patient was informed they may receive a return phone call from our office with any additional questions before processing this request.    Patient stated there was going to be a refill but there is not and is requesting a refill that was discussed

## 2019-01-16 RX ORDER — OFLOXACIN 3 MG/ML
SOLUTION AURICULAR (OTIC)
Qty: 5 ML | Refills: 0 | Status: SHIPPED | OUTPATIENT
Start: 2019-01-16 | End: 2019-12-11

## 2019-01-22 ENCOUNTER — APPOINTMENT (OUTPATIENT)
Dept: PHYSICAL THERAPY | Facility: REHABILITATION | Age: 59
End: 2019-01-22
Attending: NURSE PRACTITIONER
Payer: COMMERCIAL

## 2019-01-24 ENCOUNTER — APPOINTMENT (OUTPATIENT)
Dept: PHYSICAL THERAPY | Facility: REHABILITATION | Age: 59
End: 2019-01-24
Attending: NURSE PRACTITIONER
Payer: COMMERCIAL

## 2019-01-28 ENCOUNTER — TELEPHONE (OUTPATIENT)
Dept: MEDICAL GROUP | Facility: PHYSICIAN GROUP | Age: 59
End: 2019-01-28

## 2019-01-28 NOTE — TELEPHONE ENCOUNTER
1. Caller Name: Madhavi Baires                                          Call Back Number: [phone] (home) [phone] (work)        Patient approves a detailed voicemail message: N\A    pt called and states she used to be a pt of yours and wants to know if she could make you her PCP again. Please advise.

## 2019-01-31 ENCOUNTER — APPOINTMENT (OUTPATIENT)
Dept: PHYSICAL THERAPY | Facility: REHABILITATION | Age: 59
End: 2019-01-31
Attending: NURSE PRACTITIONER
Payer: COMMERCIAL

## 2019-02-04 DIAGNOSIS — F41.9 ANXIETY: ICD-10-CM

## 2019-02-04 RX ORDER — ALPRAZOLAM 0.25 MG/1
0.25 TABLET ORAL
Qty: 30 TAB | Refills: 0 | Status: SHIPPED
Start: 2019-02-04 | End: 2019-03-06

## 2019-02-05 ENCOUNTER — APPOINTMENT (OUTPATIENT)
Dept: PHYSICAL THERAPY | Facility: REHABILITATION | Age: 59
End: 2019-02-05
Attending: NURSE PRACTITIONER
Payer: COMMERCIAL

## 2019-02-05 NOTE — PROGRESS NOTES
Patient having severe anxiety.  Would like a prescription of xanax to hold her over until her her appointment.

## 2019-02-07 ENCOUNTER — APPOINTMENT (OUTPATIENT)
Dept: PHYSICAL THERAPY | Facility: REHABILITATION | Age: 59
End: 2019-02-07
Attending: NURSE PRACTITIONER
Payer: COMMERCIAL

## 2019-02-12 ENCOUNTER — APPOINTMENT (OUTPATIENT)
Dept: PHYSICAL THERAPY | Facility: REHABILITATION | Age: 59
End: 2019-02-12
Attending: NURSE PRACTITIONER
Payer: COMMERCIAL

## 2019-02-13 ENCOUNTER — HOSPITAL ENCOUNTER (OUTPATIENT)
Dept: LAB | Facility: MEDICAL CENTER | Age: 59
End: 2019-02-13
Attending: NURSE PRACTITIONER
Payer: COMMERCIAL

## 2019-02-13 LAB
ALBUMIN SERPL BCP-MCNC: 4.4 G/DL (ref 3.2–4.9)
ALBUMIN/GLOB SERPL: 1.2 G/DL
ALP SERPL-CCNC: 68 U/L (ref 30–99)
ALT SERPL-CCNC: 20 U/L (ref 2–50)
ANION GAP SERPL CALC-SCNC: 8 MMOL/L (ref 0–11.9)
AST SERPL-CCNC: 20 U/L (ref 12–45)
BASOPHILS # BLD AUTO: 0.5 % (ref 0–1.8)
BASOPHILS # BLD: 0.04 K/UL (ref 0–0.12)
BILIRUB SERPL-MCNC: 0.4 MG/DL (ref 0.1–1.5)
BUN SERPL-MCNC: 24 MG/DL (ref 8–22)
CALCIUM SERPL-MCNC: 9.9 MG/DL (ref 8.5–10.5)
CHLORIDE SERPL-SCNC: 105 MMOL/L (ref 96–112)
CO2 SERPL-SCNC: 25 MMOL/L (ref 20–33)
CREAT SERPL-MCNC: 0.81 MG/DL (ref 0.5–1.4)
EOSINOPHIL # BLD AUTO: 0.03 K/UL (ref 0–0.51)
EOSINOPHIL NFR BLD: 0.4 % (ref 0–6.9)
ERYTHROCYTE [DISTWIDTH] IN BLOOD BY AUTOMATED COUNT: 44.6 FL (ref 35.9–50)
FASTING STATUS PATIENT QL REPORTED: NORMAL
GLOBULIN SER CALC-MCNC: 3.6 G/DL (ref 1.9–3.5)
GLUCOSE SERPL-MCNC: 112 MG/DL (ref 65–99)
HCT VFR BLD AUTO: 48.6 % (ref 37–47)
HGB BLD-MCNC: 15.9 G/DL (ref 12–16)
IMM GRANULOCYTES # BLD AUTO: 0.02 K/UL (ref 0–0.11)
IMM GRANULOCYTES NFR BLD AUTO: 0.2 % (ref 0–0.9)
LYMPHOCYTES # BLD AUTO: 1.66 K/UL (ref 1–4.8)
LYMPHOCYTES NFR BLD: 20 % (ref 22–41)
MCH RBC QN AUTO: 29.8 PG (ref 27–33)
MCHC RBC AUTO-ENTMCNC: 32.7 G/DL (ref 33.6–35)
MCV RBC AUTO: 91 FL (ref 81.4–97.8)
MONOCYTES # BLD AUTO: 0.48 K/UL (ref 0–0.85)
MONOCYTES NFR BLD AUTO: 5.8 % (ref 0–13.4)
NEUTROPHILS # BLD AUTO: 6.08 K/UL (ref 2–7.15)
NEUTROPHILS NFR BLD: 73.1 % (ref 44–72)
NRBC # BLD AUTO: 0 K/UL
NRBC BLD-RTO: 0 /100 WBC
PLATELET # BLD AUTO: 259 K/UL (ref 164–446)
PMV BLD AUTO: 11.7 FL (ref 9–12.9)
POTASSIUM SERPL-SCNC: 3.8 MMOL/L (ref 3.6–5.5)
PROT SERPL-MCNC: 8 G/DL (ref 6–8.2)
RBC # BLD AUTO: 5.34 M/UL (ref 4.2–5.4)
SODIUM SERPL-SCNC: 138 MMOL/L (ref 135–145)
WBC # BLD AUTO: 8.3 K/UL (ref 4.8–10.8)

## 2019-02-13 PROCEDURE — 84443 ASSAY THYROID STIM HORMONE: CPT

## 2019-02-13 PROCEDURE — 80053 COMPREHEN METABOLIC PANEL: CPT

## 2019-02-13 PROCEDURE — 82670 ASSAY OF TOTAL ESTRADIOL: CPT

## 2019-02-13 PROCEDURE — 83001 ASSAY OF GONADOTROPIN (FSH): CPT

## 2019-02-13 PROCEDURE — 85025 COMPLETE CBC W/AUTO DIFF WBC: CPT

## 2019-02-13 PROCEDURE — 36415 COLL VENOUS BLD VENIPUNCTURE: CPT

## 2019-02-14 ENCOUNTER — APPOINTMENT (OUTPATIENT)
Dept: PHYSICAL THERAPY | Facility: REHABILITATION | Age: 59
End: 2019-02-14
Attending: NURSE PRACTITIONER
Payer: COMMERCIAL

## 2019-02-14 LAB
ESTRADIOL SERPL-MCNC: 25 PG/ML
FSH SERPL-ACNC: 112.1 MIU/ML
TSH SERPL DL<=0.005 MIU/L-ACNC: 0.69 UIU/ML (ref 0.38–5.33)

## 2019-02-22 DIAGNOSIS — J01.01 ACUTE RECURRENT MAXILLARY SINUSITIS: ICD-10-CM

## 2019-02-22 NOTE — TELEPHONE ENCOUNTER
Patient called and states you had told her to give you a call if she needs a refill on her Augmentin. She states she has chronic sinus problems and you had discussed that at her last appointment.     Thank you

## 2019-02-23 RX ORDER — AMOXICILLIN AND CLAVULANATE POTASSIUM 875; 125 MG/1; MG/1
1 TABLET, FILM COATED ORAL 2 TIMES DAILY
Qty: 20 TAB | Refills: 0 | OUTPATIENT
Start: 2019-02-23

## 2019-03-15 ENCOUNTER — APPOINTMENT (OUTPATIENT)
Dept: URGENT CARE | Facility: PHYSICIAN GROUP | Age: 59
End: 2019-03-15
Payer: COMMERCIAL

## 2019-03-27 ENCOUNTER — TELEMEDICINE2 (OUTPATIENT)
Dept: URGENT CARE | Facility: PHYSICIAN GROUP | Age: 59
End: 2019-03-27

## 2019-03-27 VITALS — BODY MASS INDEX: 20.89 KG/M2 | WEIGHT: 130 LBS | HEIGHT: 66 IN

## 2019-03-27 DIAGNOSIS — J01.00 ACUTE NON-RECURRENT MAXILLARY SINUSITIS: ICD-10-CM

## 2019-03-27 DIAGNOSIS — F41.9 ANXIETY: ICD-10-CM

## 2019-03-27 RX ORDER — AMOXICILLIN AND CLAVULANATE POTASSIUM 875; 125 MG/1; MG/1
1 TABLET, FILM COATED ORAL 2 TIMES DAILY
Qty: 20 TAB | Refills: 0 | Status: SHIPPED | OUTPATIENT
Start: 2019-03-27 | End: 2019-04-06

## 2019-03-27 RX ORDER — HYDROXYZINE HYDROCHLORIDE 25 MG/1
25 TABLET, FILM COATED ORAL 3 TIMES DAILY PRN
Qty: 30 TAB | Refills: 0 | Status: SHIPPED | OUTPATIENT
Start: 2019-03-27 | End: 2019-12-11

## 2019-09-24 ENCOUNTER — HOSPITAL ENCOUNTER (OUTPATIENT)
Dept: LAB | Facility: MEDICAL CENTER | Age: 59
End: 2019-09-24
Payer: COMMERCIAL

## 2019-09-24 LAB
BDY FAT % MEASURED: 32.1 %
BP DIAS: 72 MMHG
BP SYS: 130 MMHG
CHOLEST SERPL-MCNC: 219 MG/DL (ref 100–199)
DIABETES HTDIA: NO
EVENT NAME HTEVT: NORMAL
FASTING HTFAS: YES
GLUCOSE SERPL-MCNC: 90 MG/DL (ref 65–99)
HDLC SERPL-MCNC: 63 MG/DL
HYPERTENSION HTHYP: NO
LDLC SERPL CALC-MCNC: 139 MG/DL
SCREENING LOC CITY HTCIT: NORMAL
SCREENING LOC STATE HTSTA: NORMAL
SCREENING LOCATION HTLOC: NORMAL
SMOKING HTSMO: NO
SUBSCRIBER ID HTSID: NORMAL
TRIGL SERPL-MCNC: 86 MG/DL (ref 0–149)

## 2019-09-24 PROCEDURE — 36415 COLL VENOUS BLD VENIPUNCTURE: CPT

## 2019-09-24 PROCEDURE — 82947 ASSAY GLUCOSE BLOOD QUANT: CPT

## 2019-09-24 PROCEDURE — S5190 WELLNESS ASSESSMENT BY NONPH: HCPCS

## 2019-09-24 PROCEDURE — 80061 LIPID PANEL: CPT

## 2019-11-07 ENCOUNTER — OFFICE VISIT (OUTPATIENT)
Dept: MEDICAL GROUP | Facility: MEDICAL CENTER | Age: 59
End: 2019-11-07
Payer: COMMERCIAL

## 2019-11-07 VITALS
HEIGHT: 66 IN | WEIGHT: 139 LBS | RESPIRATION RATE: 14 BRPM | TEMPERATURE: 99.4 F | SYSTOLIC BLOOD PRESSURE: 110 MMHG | HEART RATE: 76 BPM | BODY MASS INDEX: 22.34 KG/M2 | OXYGEN SATURATION: 97 % | DIASTOLIC BLOOD PRESSURE: 72 MMHG

## 2019-11-07 DIAGNOSIS — M54.9 CHRONIC MIDLINE BACK PAIN, UNSPECIFIED BACK LOCATION: ICD-10-CM

## 2019-11-07 DIAGNOSIS — M54.2 NECK PAIN: ICD-10-CM

## 2019-11-07 DIAGNOSIS — Z00.00 HEALTHCARE MAINTENANCE: ICD-10-CM

## 2019-11-07 DIAGNOSIS — Z78.0 ASYMPTOMATIC POSTMENOPAUSAL STATE: ICD-10-CM

## 2019-11-07 DIAGNOSIS — F41.9 ANXIETY: ICD-10-CM

## 2019-11-07 DIAGNOSIS — K42.9 UMBILICAL HERNIA WITHOUT OBSTRUCTION AND WITHOUT GANGRENE: ICD-10-CM

## 2019-11-07 DIAGNOSIS — G89.29 CHRONIC MIDLINE BACK PAIN, UNSPECIFIED BACK LOCATION: ICD-10-CM

## 2019-11-07 PROCEDURE — 99214 OFFICE O/P EST MOD 30 MIN: CPT | Performed by: FAMILY MEDICINE

## 2019-11-07 ASSESSMENT — PATIENT HEALTH QUESTIONNAIRE - PHQ9: CLINICAL INTERPRETATION OF PHQ2 SCORE: 0

## 2019-11-09 ENCOUNTER — APPOINTMENT (OUTPATIENT)
Dept: RADIOLOGY | Facility: MEDICAL CENTER | Age: 59
End: 2019-11-09
Attending: NURSE PRACTITIONER
Payer: COMMERCIAL

## 2019-11-09 DIAGNOSIS — Z12.31 VISIT FOR SCREENING MAMMOGRAM: ICD-10-CM

## 2019-11-19 ENCOUNTER — APPOINTMENT (OUTPATIENT)
Dept: RADIOLOGY | Facility: MEDICAL CENTER | Age: 59
End: 2019-11-19
Attending: FAMILY MEDICINE
Payer: COMMERCIAL

## 2019-11-20 ENCOUNTER — APPOINTMENT (OUTPATIENT)
Dept: RADIOLOGY | Facility: MEDICAL CENTER | Age: 59
End: 2019-11-20
Attending: FAMILY MEDICINE
Payer: COMMERCIAL

## 2019-11-22 ENCOUNTER — HOSPITAL ENCOUNTER (OUTPATIENT)
Dept: RADIOLOGY | Facility: MEDICAL CENTER | Age: 59
End: 2019-11-22
Attending: FAMILY MEDICINE
Payer: COMMERCIAL

## 2019-11-22 DIAGNOSIS — M54.9 CHRONIC MIDLINE BACK PAIN, UNSPECIFIED BACK LOCATION: ICD-10-CM

## 2019-11-22 DIAGNOSIS — G89.29 CHRONIC MIDLINE BACK PAIN, UNSPECIFIED BACK LOCATION: ICD-10-CM

## 2019-11-22 DIAGNOSIS — M54.2 NECK PAIN: ICD-10-CM

## 2019-11-22 PROCEDURE — 72100 X-RAY EXAM L-S SPINE 2/3 VWS: CPT

## 2019-11-22 PROCEDURE — 72070 X-RAY EXAM THORAC SPINE 2VWS: CPT

## 2019-11-22 PROCEDURE — 72040 X-RAY EXAM NECK SPINE 2-3 VW: CPT

## 2019-11-24 DIAGNOSIS — M51.34 DDD (DEGENERATIVE DISC DISEASE), THORACIC: ICD-10-CM

## 2019-11-24 DIAGNOSIS — M51.36 DDD (DEGENERATIVE DISC DISEASE), LUMBAR: ICD-10-CM

## 2019-11-24 DIAGNOSIS — M50.30 DDD (DEGENERATIVE DISC DISEASE), CERVICAL: ICD-10-CM

## 2019-11-26 ENCOUNTER — APPOINTMENT (OUTPATIENT)
Dept: RADIOLOGY | Facility: MEDICAL CENTER | Age: 59
End: 2019-11-26
Attending: FAMILY MEDICINE
Payer: COMMERCIAL

## 2019-12-09 ENCOUNTER — PATIENT MESSAGE (OUTPATIENT)
Dept: MEDICAL GROUP | Facility: MEDICAL CENTER | Age: 59
End: 2019-12-09

## 2019-12-09 ENCOUNTER — HOSPITAL ENCOUNTER (OUTPATIENT)
Dept: RADIOLOGY | Facility: MEDICAL CENTER | Age: 59
End: 2019-12-09
Attending: FAMILY MEDICINE
Payer: COMMERCIAL

## 2019-12-09 DIAGNOSIS — Z78.0 ASYMPTOMATIC POSTMENOPAUSAL STATE: ICD-10-CM

## 2019-12-09 NOTE — TELEPHONE ENCOUNTER
From: Madhavi Hoyos  To: Charli Sanabria M.D.  Sent: 12/9/2019 9:57 AM PST  Subject: Non-Urgent Medical Question    Hi,    I wanted to check on getting scheduled to see a neurosurgeon that Dr. Sanabria recommended.    I can make the call if that's easier, I just need to know who he wants me to see.    if I can see Dr. Márquez that would be great, if not that's okay.    thanks  Teddy

## 2019-12-11 ENCOUNTER — GYNECOLOGY VISIT (OUTPATIENT)
Dept: OBGYN | Facility: CLINIC | Age: 59
End: 2019-12-11
Payer: COMMERCIAL

## 2019-12-11 VITALS — DIASTOLIC BLOOD PRESSURE: 71 MMHG | WEIGHT: 136 LBS | SYSTOLIC BLOOD PRESSURE: 130 MMHG | BODY MASS INDEX: 21.95 KG/M2

## 2019-12-11 DIAGNOSIS — Z98.82 BREAST IMPLANT STATUS: ICD-10-CM

## 2019-12-11 DIAGNOSIS — Z12.39 SCREENING FOR BREAST CANCER: ICD-10-CM

## 2019-12-11 DIAGNOSIS — N39.41 URGE INCONTINENCE OF URINE: ICD-10-CM

## 2019-12-11 PROCEDURE — 99386 PREV VISIT NEW AGE 40-64: CPT | Performed by: OBSTETRICS & GYNECOLOGY

## 2019-12-11 NOTE — NON-PROVIDER
Pt here for her New pt/ annual exam  States doing well. Has no complaints    Last mammogram: 02/2018  Ph# [phone]  Pharmacy confirmed w/

## 2019-12-11 NOTE — PROGRESS NOTES
Well woman visit     Madhavi Hoyos is a 59 y.o.  who presents to [facility] care and for her Annual Exam.  She reports that she has seen a gynecologist within the past year and had a Pap performed but she is due for a mammogram.  She reports that she occasionally feels some breast soreness in her right breast but otherwise she has not noticed any lumps or bumps or any abnormalities.  Occasionally has some urge urinary incontinence but this is rare and is not extremely bothersome to her.  Otherwise she denies complaints today     GYN History:  Patient's last menstrual period was 2015.. Menarche @15.  Menses regular, lasting  3-4days, not particularly heavy until the end.  Menopause age 49 with no bleeding since.  Last pap this year,  no h/o abnormal pap.  No history of cone biopsy, LEEP or any other cervical, uterine or gynecologic surgery. No history of sexually transmitted diseases.  Currently sexually active with one male partner.  Contraception has used IUD, OCPs, diaphragm, condoms.     OB History:    OB History    Para Term  AB Living   3 3 3     3   SAB TAB Ectopic Molar Multiple Live Births             3      # Outcome Date GA Lbr Fox/2nd Weight Sex Delivery Anes PTL Lv   3 Term 1994 40w0d  3.459 kg (7 lb 10 oz) M Vag-Spont      2 Term  40w0d  3.629 kg (8 lb) M Vag-Spont      1 Term  40w0d  3.459 kg (7 lb 10 oz) F Vag-Spont        Health Maintenance:  Last mammogram: 1-2 years ago  Last colorectal screening: up to date  Immunizations: up to date    Review of Systems:   Pertinent positives documented in HPI and all other systems reviewed & are negative.    All PMH, PSH, allergies, social history and FH reviewed and updated today:  Past Medical History:  Past Medical History:   Diagnosis Date   • Allergic rhinitis 3/5/2013   • Chronic rhinitis 3/5/2013   • Indigestion      Past Surgical History:  Past Surgical History:   Procedure Laterality Date   • OTHER  ?    breast  implants   • PB ENLARGE BREAST WITH IMPLANT     • TONSILLECTOMY       Medications:   Current Outpatient Medications Ordered in Epic   Medication Sig Dispense Refill   • fluticasone (FLONASE) 50 MCG/ACT nasal spray Spray 2 Sprays in nose every day. 16 g 11   • loratadine (CLARITIN) 10 MG Tab Take 10 mg by mouth every day.       No current [facility]-ordered facility-administered medications on file.      Allergies: Zithromax [azithromycin dihydrate]    Social History:  Social History     Socioeconomic History   • Marital status:      Spouse name: Not on file   • Number of children: Not on file   • Years of education: Not on file   • Highest education level: Not on file   Occupational History   • Not on file   Social Needs   • Financial resource strain: Not on file   • Food insecurity:     Worry: Not on file     Inability: Not on file   • Transportation needs:     Medical: Not on file     Non-medical: Not on file   Tobacco Use   • Smoking status: Never Smoker   • Smokeless tobacco: Never Used   Substance and Sexual Activity   • Alcohol use: No     Alcohol/week: 0.0 oz   • Drug use: No   • Sexual activity: Yes     Birth control/protection: Post-Menopausal     Comment:  vasectomy   Lifestyle   • Physical activity:     Days per week: Not on file     Minutes per session: Not on file   • Stress: Not on file   Relationships   • Social connections:     Talks on phone: Not on file     Gets together: Not on file     Attends [employer] service: Not on file     Active member of club or organization: Not on file     Attends meetings of clubs or organizations: Not on file     Relationship status: Not on file   • Intimate partner violence:     Fear of current or ex partner: Not on file     Emotionally abused: Not on file     Physically abused: Not on file     Forced sexual activity: Not on file   Other Topics Concern   • Not on file   Social History Narrative   • Not on file       Family History:  Family History   Problem  Relation Age of Onset   • Diabetes Mother    • Heart Disease Mother         cad   • Cancer Father 44        stomach   • Cancer Maternal Aunt         colon   • Diabetes Maternal Grandmother    • Cancer Maternal Grandmother         breast   • Breast Cancer Maternal Grandmother    • Hypertension Neg Hx    • Hyperlipidemia Neg Hx    • Stroke Neg Hx          Objective:   Vitals:  /71   Wt 61.7 kg (136 lb)   Body mass index is 21.95 kg/m². (Goal BM I>18 <25)    Physical Exam:   Nursing note and vitals reviewed.  Physical Exam   Constitutional: She is oriented to person, place, and time and well-developed, well-nourished, and in no distress.   HENT:   Head: Normocephalic and atraumatic.   Eyes: Pupils are equal, round, and reactive to light.   Neck: Normal range of motion. Neck supple. No thyromegaly present.   Cardiovascular: Normal rate, regular rhythm and normal heart sounds.   Pulmonary/Chest: Effort normal and breath sounds normal. Right breast exhibits no inverted nipple, no mass, no nipple discharge and no tenderness. Left breast exhibits no inverted nipple, no mass, no nipple discharge, no skin change and no tenderness.       Bilateral implants, scars c/w prior surgery, R breast with scar surrounding areola, left with scar just superior to areola.   Abdominal: Soft. Bowel sounds are normal. She exhibits no distension.   Musculoskeletal: Normal range of motion.   Neurological: She is alert and oriented to person, place, and time. Gait normal.   Skin: Skin is warm and dry.   Psychiatric: Mood, memory, affect and judgment normal.        Assessment/Plan:     1. Screening for breast cancer  MA-SCREEN MAMMO W/IMPLANTS W/CAD-BILAT   2. Breast implant status  MA-SCREEN MAMMO W/IMPLANTS W/CAD-BILAT   3. Urge incontinence of urine         Madhavi Hoyos is a 59 y.o.  female who presents for her annual gynecologic exam.   # Preventative health care:   --Breast self awareness discussed. Mammogram  ordered(annually starting at age 40).  --Cervical cancer screening. Last Pap this year - records request sent.   --Colorectal screening follow up as indicated.   --Immunizations are up to date.  --Diet, exercise, vitamin supplementation, and hydration discussed.  #Urge urinary incontinence.  Discussed this with patient today.  Advised dietary and lifestyle modifications.  Patient is very appreciative of the information she will start to pay attention to when these leaking episodes occur as they are related to her diet.  She will return if she desires further management of this condition.  # Follow up annually or sooner if concerns or questions arise.

## 2020-01-13 ENCOUNTER — OFFICE VISIT (OUTPATIENT)
Dept: DERMATOLOGY | Facility: IMAGING CENTER | Age: 60
End: 2020-01-13
Payer: COMMERCIAL

## 2020-01-13 DIAGNOSIS — D18.01 CHERRY ANGIOMA: ICD-10-CM

## 2020-01-13 DIAGNOSIS — L72.11 PILAR CYST: ICD-10-CM

## 2020-01-13 PROCEDURE — 99212 OFFICE O/P EST SF 10 MIN: CPT | Performed by: NURSE PRACTITIONER

## 2020-01-13 ASSESSMENT — ENCOUNTER SYMPTOMS
WEIGHT LOSS: 0
FEVER: 0
DIAPHORESIS: 0
CHILLS: 0

## 2020-01-13 NOTE — PROGRESS NOTES
"Dermatology New Patient Visit    Chief Complaint   Patient presents with   • Establish Care       Subjective:     HPI:   Madhavi Hoyos is a 59 y.o. female presenting for    Lump on head had it removed 20 years ago-has grown back. Was told it was a cyst and they were not able \"to get all of it\" when it was removed 20 years ago.     Would like to discuss removal of cherry angiomas  HPI/location: on torso and face   Time present: since lifetime   Painful lesion: No  Itching lesion: No  Enlarging lesion: No  Anything make it better or worse? They rub on clothing     History of skin cancer: No  History of precancers/actinic keratoses: No  History of biopsies:Yes, Details: head  History of blistering/severe sunburns:No  Family history of skin cancer:No  Family history of atypical moles:No            Past Medical History:   Diagnosis Date   • Allergic rhinitis 3/5/2013   • Chronic rhinitis 3/5/2013   • Indigestion        Current Outpatient Medications on File Prior to Visit   Medication Sig Dispense Refill   • fluticasone (FLONASE) 50 MCG/ACT nasal spray Spray 2 Sprays in nose every day. 16 g 11   • loratadine (CLARITIN) 10 MG Tab Take 10 mg by mouth every day.       No current facility-administered medications on file prior to visit.        Allergies   Allergen Reactions   • Zithromax [Azithromycin Dihydrate] Rash       Family History   Problem Relation Age of Onset   • Diabetes Mother    • Heart Disease Mother         cad   • Cancer Father 44        stomach   • Cancer Maternal Aunt         colon   • Diabetes Maternal Grandmother    • Cancer Maternal Grandmother         breast   • Breast Cancer Maternal Grandmother    • Hypertension Neg Hx    • Hyperlipidemia Neg Hx    • Stroke Neg Hx        Social History     Socioeconomic History   • Marital status:      Spouse name: Not on file   • Number of children: Not on file   • Years of education: Not on file   • Highest education level: Not on file "   Occupational History   • Not on file   Social Needs   • Financial resource strain: Not on file   • Food insecurity:     Worry: Not on file     Inability: Not on file   • Transportation needs:     Medical: Not on file     Non-medical: Not on file   Tobacco Use   • Smoking status: Never Smoker   • Smokeless tobacco: Never Used   Substance and Sexual Activity   • Alcohol use: No     Alcohol/week: 0.0 oz   • Drug use: No   • Sexual activity: Yes     Birth control/protection: Post-Menopausal     Comment:  vasectomy   Lifestyle   • Physical activity:     Days per week: Not on file     Minutes per session: Not on file   • Stress: Not on file   Relationships   • Social connections:     Talks on phone: Not on file     Gets together: Not on file     Attends [employer] service: Not on file     Active member of club or organization: Not on file     Attends meetings of clubs or organizations: Not on file     Relationship status: Not on file   • Intimate partner violence:     Fear of current or ex partner: Not on file     Emotionally abused: Not on file     Physically abused: Not on file     Forced sexual activity: Not on file   Other Topics Concern   • Not on file   Social History Narrative   • Not on file       Review of Systems   Constitutional: Negative for chills, diaphoresis, fever, malaise/fatigue and weight loss.   Skin: Negative for itching and rash.        Objective:     A focused cutaneous exam was completed including: scalp, hair, face, eyelids, lips, neck, chest and abdomen with the following pertinent findings listed below. Remaining above-listed examined areas within normal limits / negative for rashes or lesions.        There were no vitals taken for this visit.    Physical Exam   Constitutional: She is oriented to person, place, and time and well-developed, well-nourished, and in no distress. No distress.   HENT:   Head: Normocephalic.       Pulmonary/Chest: Effort normal.   Neurological: She is alert and  oriented to person, place, and time.   Skin: Skin is warm and dry. No rash noted. No erythema.   Several scattered 1-5mm bright red macules and thin papules on the trunk     Psychiatric: Mood normal.       DATA: none applicable to review    Assessment and Plan:     1. Pilar cyst   Prior auth placed for removal.  Educated patient about diagnosis, management options, and expectations of treatment.      2. Cherry angioma  - Benign-appearing nature of lesions discussed.   -provided contact information for [facility] to consult for removal.         Followup: Return for 2/5/2020 for pilar cyst removal.    BEBO Manzano.

## 2020-02-20 ENCOUNTER — APPOINTMENT (OUTPATIENT)
Dept: RADIOLOGY | Facility: MEDICAL CENTER | Age: 60
End: 2020-02-20
Attending: OBSTETRICS & GYNECOLOGY
Payer: COMMERCIAL

## 2020-02-22 ENCOUNTER — TELEPHONE (OUTPATIENT)
Dept: HEALTH INFORMATION MANAGEMENT | Facility: OTHER | Age: 60
End: 2020-02-22

## 2020-02-22 DIAGNOSIS — Z12.11 COLON CANCER SCREENING: ICD-10-CM

## 2020-02-25 ENCOUNTER — PATIENT MESSAGE (OUTPATIENT)
Dept: HEALTH INFORMATION MANAGEMENT | Facility: OTHER | Age: 60
End: 2020-02-25

## 2020-02-28 ENCOUNTER — PHYSICAL THERAPY (OUTPATIENT)
Dept: PHYSICAL THERAPY | Facility: REHABILITATION | Age: 60
End: 2020-02-28
Attending: NURSE PRACTITIONER
Payer: COMMERCIAL

## 2020-02-28 DIAGNOSIS — M54.16 RADICULOPATHY, LUMBAR REGION: ICD-10-CM

## 2020-02-28 PROCEDURE — 97014 ELECTRIC STIMULATION THERAPY: CPT

## 2020-02-28 PROCEDURE — 97110 THERAPEUTIC EXERCISES: CPT

## 2020-02-28 SDOH — ECONOMIC STABILITY: GENERAL: QUALITY OF LIFE: EXCELLENT

## 2020-02-28 ASSESSMENT — ENCOUNTER SYMPTOMS
PAIN SCALE AT HIGHEST: 7
PAIN SCALE AT LOWEST: 0
PAIN SCALE: 1

## 2020-02-28 NOTE — OP THERAPY EVALUATION
"  Outpatient Physical Therapy  INITIAL EVALUATION    [facility] [address].  [address]  [place] [address]  Phone:  [phone]  Fax:  [phone]    Date of Evaluation: 02/28/2020    Patient: Teddy Hoyos  YOB: 1960  MRN: [id number]     Referring Provider: IAN Santizo  [address] [place] [address]   Referring Diagnosis Radiculopathy, lumbar region [M54.16]     Time Calculation  Start time: 0830  Stop time: 0935 Time Calculation (min): 65 minutes       Physical Therapy Occurrence Codes    Date physical therapy care plan established or reviewed:  2/28/20   Date physical therapy treatment started:  2/28/20          Chief Complaint: No chief complaint on file.    Visit Diagnoses     ICD-10-CM   1. Radiculopathy, lumbar region M54.16         Subjective:   History of Present Illness:     Mechanism of injury:  The patient reports 4 month history of R side glute pain that radiates to lateral leg proximal to knee, occasionally down to foot.      Symptoms provoked standing up from sitting, sitting cross legged, walking most of the time (limping because it hurts), Sometimes painful at night \"dull ache\". Standing still for >10 minutes.    Symptoms relieved by stopping the provoking activity. Has to wait for a while before walking when standing up because too painful.      Prior onset of this issue, usually not having R hip pain at all.   Endorses occasional numbness in L knee, notices only when kneeling.   No changes to bowel/bladder function. No unexplained fevers, weight loss.         History notable for  partial ligament tear in R hip from 10 years ago from non-traumatic injury (running).   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 Exercise program: walking - low impact exercise program.  Sleep disturbance:  Interrupted " sleep  Pain:     Current pain ratin    At best pain ratin    At worst pain ratin    Location:  R side low back radiating to lateral leg proximal to knee and at times to ankle  Diagnostic Tests:     Diagnostic Tests Comments:  L-spine xray 2019  IMPRESSION:     Degenerative changes at L5/S1.     Facet arthropathy in the lower lumbar spine.     Patient Goals:     Patient goals for therapy:  Increased motion, return to sport/leisure activities, increased strength and decreased pain    Other patient goals:  To find some exercises to strengthen, to get up from sitting without pain, to walk 3 miles on uneven terrain without limitation from back pain      Past Medical History:   Diagnosis Date   • Allergic rhinitis 3/5/2013   • Chronic rhinitis 3/5/2013   • Indigestion      Past Surgical History:   Procedure Laterality Date   • OTHER  ?    breast implants   • PB ENLARGE BREAST WITH IMPLANT     • TONSILLECTOMY       Social History     Tobacco Use   • Smoking status: Never Smoker   • Smokeless tobacco: Never Used   Substance Use Topics   • Alcohol use: No     Alcohol/week: 0.0 oz     Family and Occupational History     Socioeconomic History   • Marital status:      Spouse name: Not on file   • Number of children: Not on file   • Years of education: Not on file   • Highest education level: Not on file   Occupational History   • Not on file       Objective     Postural Observations  Seated posture: good  Standing posture: good    Additional Postural Observation Details  R ASIS slightly elevated    Hip Screen   Hip range of motion within functional limits with the following exceptions: R glute region pain with end range R hip ER 90/90  Hip ROM otherwise WFL bilaterally  Hip joint mobility within functional limits    Neurological Testing     Reflexes   Left   Patellar (L4): normal (2+)  Achilles (S1): normal (2+)  Ankle clonus reflex: negative  Babinski sign: negative    Right   Patellar (L4):  normal (2+)  Achilles (S1): normal (2+)  Ankle clonus reflex: negative  Babinski sign: negative    Myotome testing   Lumbar (left)   All left lumbar myotomes within normal limits    Lumbar (right)   All right lumbar myotomes within normal limits    Dermatome testing   Lumbar (left)   All left lumbar dermatomes intact    Lumbar (right)   All right lumbar dermatomes intact    Palpation     Right   Tenderness of the gluteus medius, lumbar paraspinals and piriformis.     Tenderness     Left Hip   No tenderness in the sacroiliac joint and sacrum.     Right Hip   Tenderness in the sacroiliac joint and sacrum.     Active Range of Motion     Lumbar   All lumbar active range of motion within functional limits    Additional Active Range of Motion Details  No sx provocation with lumbar ROM    Joint Play   Spine     Central PA [place]        T10: WFL       T11: WFL       T12: WFL       L1: WFL       L2: WFL       L3: WFL       L4: WFL       L5: painful       S1: painful (radiating sx to R glute)        Strength:      Left Hip   Planes of Motion   Extension: 4+  Abduction: 4+    Right Hip   Planes of Motion   Extension: 4-  Abduction: 3+ (with pain)    Additional Strength Details  Hip ER left: 4/5 R 3+/5    Tests     Left Hip   SLR: Negative.     Right Hip   SLR: Negative.     General Comments     Spine Comments   Slight leg length discrepancy - R 94 cm L 93 cm        Therapeutic Exercises (CPT 68279):       Therapeutic Exercise Summary: Access Code: [email or web address]   URL: [email or web address]   Date: 02/28/2020   Prepared by: Dorene Pedroza      Exercises  · Hooklying Isometric Hip Abduction with Belt - 20 reps - 2 sets - 5-10 second hold - 2x daily - 7x weekly  · Supine Bridge - 10 reps - 2 sets - 5 second hold - 2x daily - 7x weekly        Therapeutic Treatments and Modalities:     1. E Stim Unattended (CPT 30970), R glute med Russian 10/10 w/mhp 15'    Time-based treatments/modalities:  Therapeutic exercise minutes (CPT  44426): 8 minutes       Assessment, Response and Plan:   Impairments: abnormal or restricted ROM, difficulty performing job, impaired functional mobility, impaired physical strength, lacks appropriate home exercise program and pain with function    Assessment details:  59 year old female with 4 month history of R side glute region pain radiating down lateral RLE to knee and at times to foot. History also notable for ligamentous tear R hip (patient cannot recall exact details) 10 years ago when running a 5k. Had intermittent pain in that region for 2-3 years afterwards, but no recent pain until current issue. Exam findings show SI joint dysfunction R, as well as muscle performance impairments R hip ER, extension, abduction. Scour test negative for articular pain at hip. Recommend skilled PT to address associated impairments/limitations in order to improve function and QOL.   Barriers to therapy:  None  Prognosis: good    Goals:   Short Term Goals:   Patient is independent/compliant with HEP        Long Term Goals:    Improved body mechanics in lifting/pushing/pulling tasks as evidenced by performance of same in clinic without cueing from therapist  Michael Garcia score improved >/= MCID  Resisted hip abduction and hip ER R >/= 4/5 MMT with 90% reduction in sx  SLS R 30 sec with no provocation of sx  Patient reports 90% reduction in frequency/intensity of symptoms when standing after sitting  Patient can stand for 30 minutes with 90% reduction in symptoms  Long term goal time span:  6-8 weeks    Plan:   Therapy options:  Physical therapy treatment to continue  Planned therapy interventions:  E Stim Unattended (CPT 04408), Manual Therapy (CPT 84105), Neuromuscular Re-education (CPT 55715), Therapeutic Exercise (CPT 76788), Therapeutic Activities (CPT 09424) and Mechanical Traction (CPT 38003)  Frequency:  2x week  Duration in visits:  10  Discussed with:  Patient      Functional Assessment Used  Michael Garcia Low Back  Pain and Disability Score: 41.67     Referring provider co-signature:  I have reviewed this plan of care and my co-signature certifies the need for services.  Certification Dates:   From 02/28/20   To 05/01/20    Physician Signature: ________________________________ Date: ______________

## 2020-03-06 ENCOUNTER — APPOINTMENT (OUTPATIENT)
Dept: PHYSICAL THERAPY | Facility: REHABILITATION | Age: 60
End: 2020-03-06
Attending: NURSE PRACTITIONER
Payer: COMMERCIAL

## 2020-03-13 ENCOUNTER — APPOINTMENT (OUTPATIENT)
Dept: PHYSICAL THERAPY | Facility: REHABILITATION | Age: 60
End: 2020-03-13
Attending: NURSE PRACTITIONER
Payer: COMMERCIAL

## 2020-03-17 ENCOUNTER — APPOINTMENT (OUTPATIENT)
Dept: RADIOLOGY | Facility: MEDICAL CENTER | Age: 60
End: 2020-03-17
Attending: NURSE PRACTITIONER
Payer: COMMERCIAL

## 2020-03-20 ENCOUNTER — APPOINTMENT (OUTPATIENT)
Dept: PHYSICAL THERAPY | Facility: REHABILITATION | Age: 60
End: 2020-03-20
Attending: NURSE PRACTITIONER
Payer: COMMERCIAL

## 2020-05-13 ENCOUNTER — HOSPITAL ENCOUNTER (OUTPATIENT)
Facility: MEDICAL CENTER | Age: 60
End: 2020-05-13
Attending: FAMILY MEDICINE
Payer: COMMERCIAL

## 2020-05-13 PROCEDURE — 82274 ASSAY TEST FOR BLOOD FECAL: CPT

## 2020-05-20 LAB — HEMOCCULT STL QL IA: NEGATIVE

## 2020-08-17 ENCOUNTER — APPOINTMENT (OUTPATIENT)
Dept: RADIOLOGY | Facility: MEDICAL CENTER | Age: 60
End: 2020-08-17
Attending: NURSE PRACTITIONER
Payer: COMMERCIAL

## 2020-10-15 ENCOUNTER — APPOINTMENT (OUTPATIENT)
Dept: RADIOLOGY | Facility: MEDICAL CENTER | Age: 60
End: 2020-10-15
Attending: NURSE PRACTITIONER
Payer: COMMERCIAL

## 2020-12-20 DIAGNOSIS — Z23 NEED FOR VACCINATION: ICD-10-CM

## 2021-02-02 ENCOUNTER — HOSPITAL ENCOUNTER (OUTPATIENT)
Dept: RADIOLOGY | Facility: MEDICAL CENTER | Age: 61
End: 2021-02-02
Attending: NURSE PRACTITIONER
Payer: COMMERCIAL

## 2021-02-02 DIAGNOSIS — M54.12 RADICULOPATHY, CERVICAL REGION: ICD-10-CM

## 2021-02-02 PROCEDURE — 72050 X-RAY EXAM NECK SPINE 4/5VWS: CPT

## 2021-02-17 ENCOUNTER — APPOINTMENT (OUTPATIENT)
Dept: RADIOLOGY | Facility: MEDICAL CENTER | Age: 61
End: 2021-02-17
Attending: NURSE PRACTITIONER
Payer: COMMERCIAL

## 2021-03-31 ENCOUNTER — IMMUNIZATION (OUTPATIENT)
Dept: FAMILY PLANNING/WOMEN'S HEALTH CLINIC | Facility: IMMUNIZATION CENTER | Age: 61
End: 2021-03-31
Payer: COMMERCIAL

## 2021-03-31 DIAGNOSIS — Z23 ENCOUNTER FOR VACCINATION: Primary | ICD-10-CM

## 2021-03-31 PROCEDURE — 0001A PFIZER SARS-COV-2 VACCINE: CPT

## 2021-03-31 PROCEDURE — 91300 PFIZER SARS-COV-2 VACCINE: CPT

## 2021-04-01 ENCOUNTER — IMMUNIZATION (OUTPATIENT)
Dept: FAMILY PLANNING/WOMEN'S HEALTH CLINIC | Facility: IMMUNIZATION CENTER | Age: 61
End: 2021-04-01
Payer: COMMERCIAL

## 2021-04-01 DIAGNOSIS — Z23 ENCOUNTER FOR VACCINATION: Primary | ICD-10-CM

## 2021-05-24 ENCOUNTER — APPOINTMENT (OUTPATIENT)
Dept: RADIOLOGY | Facility: MEDICAL CENTER | Age: 61
End: 2021-05-24
Attending: FAMILY MEDICINE
Payer: COMMERCIAL

## 2021-05-24 DIAGNOSIS — Z12.31 VISIT FOR SCREENING MAMMOGRAM: ICD-10-CM

## 2021-06-27 ENCOUNTER — OFFICE VISIT (OUTPATIENT)
Dept: URGENT CARE | Facility: PHYSICIAN GROUP | Age: 61
End: 2021-06-27
Payer: COMMERCIAL

## 2021-06-27 VITALS
OXYGEN SATURATION: 94 % | DIASTOLIC BLOOD PRESSURE: 72 MMHG | WEIGHT: 142.8 LBS | RESPIRATION RATE: 16 BRPM | HEART RATE: 82 BPM | HEIGHT: 66 IN | BODY MASS INDEX: 22.95 KG/M2 | SYSTOLIC BLOOD PRESSURE: 120 MMHG | TEMPERATURE: 100.8 F

## 2021-06-27 DIAGNOSIS — H60.312 ACUTE DIFFUSE OTITIS EXTERNA OF LEFT EAR: ICD-10-CM

## 2021-06-27 DIAGNOSIS — R50.9 FEVER, UNSPECIFIED FEVER CAUSE: ICD-10-CM

## 2021-06-27 PROCEDURE — 99214 OFFICE O/P EST MOD 30 MIN: CPT | Performed by: NURSE PRACTITIONER

## 2021-06-27 RX ORDER — OFLOXACIN 3 MG/ML
5 SOLUTION AURICULAR (OTIC) DAILY
Qty: 10 ML | Refills: 0 | Status: SHIPPED | OUTPATIENT
Start: 2021-06-27 | End: 2021-10-28

## 2021-06-27 RX ORDER — AMOXICILLIN AND CLAVULANATE POTASSIUM 875; 125 MG/1; MG/1
1 TABLET, FILM COATED ORAL 2 TIMES DAILY
Qty: 14 TABLET | Refills: 0 | Status: SHIPPED | OUTPATIENT
Start: 2021-06-27 | End: 2021-07-04

## 2021-06-27 ASSESSMENT — ENCOUNTER SYMPTOMS
SORE THROAT: 0
COUGH: 0
NECK PAIN: 0
BACK PAIN: 0
MEMORY LOSS: 0
MYALGIAS: 0
FEVER: 1
ORTHOPNEA: 0
DIARRHEA: 0
CHILLS: 0
TINGLING: 0
ABDOMINAL PAIN: 0
PALPITATIONS: 0
WHEEZING: 0
HEARTBURN: 0
SINUS PAIN: 0
HEADACHES: 0
NAUSEA: 0
SHORTNESS OF BREATH: 0
RHINORRHEA: 0
VOMITING: 0
CONSTIPATION: 0
DIZZINESS: 0
STRIDOR: 0

## 2021-06-27 NOTE — PROGRESS NOTES
"Subjective:      Teddy Baires is a 61 y.o. female who presents with Earache (L ear pain, ear canal feels swollen, dull ache, x5 days)            Otalgia   There is pain in the left ear. This is a new problem. The current episode started in the past 7 days. The problem occurs constantly. The problem has been gradually worsening. The maximum temperature recorded prior to her arrival was 100.4 - 100.9 F. The fever has been present for less than 1 day. The pain is moderate. Associated symptoms include ear discharge and hearing loss. Pertinent negatives include no abdominal pain, coughing, diarrhea, headaches, neck pain, rash, rhinorrhea, sore throat or vomiting. She has tried nothing for the symptoms. The treatment provided no relief. There is no history of a chronic ear infection or hearing loss.       Review of Systems   Constitutional: Positive for fever. Negative for chills and malaise/fatigue.   HENT: Positive for congestion, ear discharge, ear pain and hearing loss. Negative for rhinorrhea, sinus pain, sore throat and tinnitus.    Respiratory: Negative for cough, shortness of breath, wheezing and stridor.    Cardiovascular: Negative for chest pain, palpitations, orthopnea and leg swelling.   Gastrointestinal: Negative for abdominal pain, constipation, diarrhea, heartburn, nausea and vomiting.   Musculoskeletal: Negative for back pain, joint pain, myalgias and neck pain.   Skin: Negative for rash.   Neurological: Negative for dizziness, tingling and headaches.   Psychiatric/Behavioral: Negative for memory loss and suicidal ideas.   All other systems reviewed and are negative.         Objective:     /72 (BP Location: Left arm, Patient Position: Sitting, BP Cuff Size: Adult)   Pulse 82   Temp (!) 38.2 °C (100.8 °F) (Temporal)   Resp 16   Ht 1.676 m (5' 6\")   Wt 64.8 kg (142 lb 12.8 oz)   LMP 03/09/2015   SpO2 94%   BMI 23.05 kg/m²      Physical Exam  Vitals reviewed.   Constitutional:       " General: She is not in acute distress.     Appearance: Normal appearance. She is not ill-appearing.   HENT:      Head: Normocephalic.      Right Ear: Tympanic membrane, ear canal and external ear normal.      Left Ear: External ear normal. Decreased hearing noted. Drainage, swelling and tenderness present. Tympanic membrane is injected ( Unable to visualize the entire TM due to swelling).      Nose: Nose normal. No congestion.      Mouth/Throat:      Mouth: Mucous membranes are moist.   Eyes:      Extraocular Movements: Extraocular movements intact.      Conjunctiva/sclera: Conjunctivae normal.   Cardiovascular:      Rate and Rhythm: Normal rate and regular rhythm.      Pulses: Normal pulses.   Pulmonary:      Effort: Pulmonary effort is normal.   Abdominal:      General: Abdomen is flat. Bowel sounds are normal.      Palpations: Abdomen is soft.      Tenderness: There is no abdominal tenderness.   Musculoskeletal:         General: Normal range of motion.      Cervical back: Normal range of motion and neck supple. No tenderness.   Lymphadenopathy:      Cervical: No cervical adenopathy.   Skin:     General: Skin is warm and dry.      Capillary Refill: Capillary refill takes less than 2 seconds.   Neurological:      Mental Status: She is alert and oriented to person, place, and time.   Psychiatric:         Mood and Affect: Mood normal.         Behavior: Behavior normal.                       Assessment/Plan:        1. Acute diffuse otitis externa of left ear  amoxicillin-clavulanate (AUGMENTIN) 875-125 MG Tab    ofloxacin otic sol (FLOXIN OTIC) 0.3 % Solution   2. Fever, unspecified fever cause  amoxicillin-clavulanate (AUGMENTIN) 875-125 MG Tab    ofloxacin otic sol (FLOXIN OTIC) 0.3 % Solution       Patient has otitis externa,  Symptoms include inflammation, itching, and or pain. Patient was advised not to place objects in the ear which may cause trauma. Started patient on antibiotic ear drops. Patient to follow up  if no improvement or worsening of symptoms. May take Tylenol and/or ibuprofen for pain. Follow up with primary care manager as needed       Supportive care, differential diagnoses, and indications for immediate follow-up discussed with patient.    Pathogenesis of diagnosis discussed including typical length and natural progression. Patient expresses understanding and agrees to plan.     Instructed patient to follow up with PCP or return to clinic for worsening symptoms or symptoms that persist for 7 to 10 days     Please note that this dictation was created using voice recognition software. I have made every reasonable attempt to correct obvious errors, but I expect that there are errors of grammar and possibly content that I did not discover before finalizing the note.

## 2021-10-28 ENCOUNTER — OFFICE VISIT (OUTPATIENT)
Dept: MEDICAL GROUP | Facility: MEDICAL CENTER | Age: 61
End: 2021-10-28
Payer: COMMERCIAL

## 2021-10-28 VITALS
DIASTOLIC BLOOD PRESSURE: 60 MMHG | OXYGEN SATURATION: 95 % | HEART RATE: 65 BPM | SYSTOLIC BLOOD PRESSURE: 100 MMHG | RESPIRATION RATE: 16 BRPM | TEMPERATURE: 98 F | WEIGHT: 145 LBS | BODY MASS INDEX: 23.3 KG/M2 | HEIGHT: 66 IN

## 2021-10-28 DIAGNOSIS — Z13.6 ENCOUNTER FOR LIPID SCREENING FOR CARDIOVASCULAR DISEASE: ICD-10-CM

## 2021-10-28 DIAGNOSIS — Z13.220 ENCOUNTER FOR LIPID SCREENING FOR CARDIOVASCULAR DISEASE: ICD-10-CM

## 2021-10-28 DIAGNOSIS — Z13.0 SCREENING FOR DEFICIENCY ANEMIA: ICD-10-CM

## 2021-10-28 DIAGNOSIS — M21.619 BUNION OF UNSPECIFIED FOOT: ICD-10-CM

## 2021-10-28 DIAGNOSIS — Z12.11 COLON CANCER SCREENING: ICD-10-CM

## 2021-10-28 DIAGNOSIS — Z12.31 SCREENING MAMMOGRAM FOR BREAST CANCER: ICD-10-CM

## 2021-10-28 PROCEDURE — 99213 OFFICE O/P EST LOW 20 MIN: CPT | Performed by: FAMILY MEDICINE

## 2021-10-28 ASSESSMENT — PATIENT HEALTH QUESTIONNAIRE - PHQ9: CLINICAL INTERPRETATION OF PHQ2 SCORE: 0

## 2021-10-28 NOTE — PROGRESS NOTES
"CC: Bunion of both feet, allergic rhinitis    HPI:   Madhavi presents today to discuss the following:    Bunion of unspecified foot  Patient reported that she has bunions on both feet, has been getting worse.  She requested referral to a podiatrist.    Allergic rhinitis  Patient with history of chronic allergic rhinitis, gets symptomatic once in a while.  However Flonase over-the-counter has been helping.  Currently denies any ear pain or discharge.     Patient is due for mammogram, blood work, Pap smear, colon cancer breast cancer screening.  Refused flu vaccine    Patient Active Problem List    Diagnosis Date Noted   • Dysfunction of left eustachian tube 06/11/2018   • Allergic rhinitis 03/05/2013       Current Outpatient Medications   Medication Sig Dispense Refill   • ofloxacin otic sol (FLOXIN OTIC) 0.3 % Solution Administer 5 Drops into affected ear(s) every day. 10 mL 0     No current facility-administered medications for this visit.         Allergies as of 10/28/2021   • (No Active Allergies)        ROS: Denies any chest pain, Shortness of breath, Changes bowel or bladder, Lower extremity edema.    Physical Exam:  /60 (BP Location: Right arm, Patient Position: Sitting, BP Cuff Size: Adult)   Pulse 65   Temp 36.7 °C (98 °F) (Temporal)   Resp 16   Ht 1.676 m (5' 6\")   Wt 65.8 kg (145 lb)   LMP 03/09/2015   SpO2 95%   BMI 23.40 kg/m²   Gen.: Well-developed, well-nourished, no apparent distress,pleasant and cooperative with the examination  Skin:  Warm and dry with good turgor. No rashes or suspicious lesions in visible areas  Eye: PERRLA, conjunctiva and sclera clear, lids normal  HEENT:Sinuses nontender with palpation, TMs clear, nares patent with pink mucosa, and clear rhinorrhea,no septal deviation ,polyps or lesions. lips without lesions, oropharynx clear.  Neck: Trachea midline,no masses or adenopathy. No JVD.  Thyroid: normal consistency and size. No masses or nodules. Not tender with " palpation.  Cor: Regular rate and rhythm without murmur, gallop or rub.  Lungs: Respirations unlabored.Clear to auscultation with equal breath sounds bilaterally. No wheezes, rhonchi.  Abdomen: Soft nontender without hepatosplenomegaly or masses appreciated, normoactive bowel sounds. No hernias.  Extremities: No cyanosis, clubbing or edema, Symmetrical without deformities or malformations. Pulses 2+ and symmetrical both upper and lower extremities  Psych: Alert and oriented x 3.Normal affect, judgement,insight and memory.      Assessment and Plan.   61 y.o. female     1. Encounter for lipid screening for cardiovascular disease    - Lipid Profile; Future    2. Screening for deficiency anemia    - CBC WITH DIFFERENTIAL; Future  - Comp Metabolic Panel; Future    3. Colon cancer screening    - OCCULT BLOOD FECES IMMUNOASSAY; Future    4. Screening mammogram for breast cancer    - MA-SCREENING MAMMO BILAT W/CAD; Future    5. Bunion of unspecified foot  Gets worse bilaterally, requested referral to a podiatrist.  - Referral to Podiatry    6.  Allergic rhinitis  Chronic.  Flonase has been helping.

## 2021-11-05 ENCOUNTER — HOSPITAL ENCOUNTER (OUTPATIENT)
Facility: MEDICAL CENTER | Age: 61
End: 2021-11-05
Attending: FAMILY MEDICINE
Payer: COMMERCIAL

## 2021-11-05 PROCEDURE — 82274 ASSAY TEST FOR BLOOD FECAL: CPT

## 2021-11-09 DIAGNOSIS — Z12.11 COLON CANCER SCREENING: ICD-10-CM

## 2021-11-11 LAB — IMM ASSAY OCC BLD FITOB: NEGATIVE

## 2021-12-10 ENCOUNTER — HOSPITAL ENCOUNTER (OUTPATIENT)
Dept: RADIOLOGY | Facility: MEDICAL CENTER | Age: 61
End: 2021-12-10
Attending: FAMILY MEDICINE
Payer: COMMERCIAL

## 2021-12-10 DIAGNOSIS — Z12.31 VISIT FOR SCREENING MAMMOGRAM: ICD-10-CM

## 2021-12-10 PROCEDURE — 77063 BREAST TOMOSYNTHESIS BI: CPT

## 2021-12-12 DIAGNOSIS — R92.8 ABNORMAL MAMMOGRAM: ICD-10-CM

## 2021-12-13 ENCOUNTER — PATIENT MESSAGE (OUTPATIENT)
Dept: MEDICAL GROUP | Facility: MEDICAL CENTER | Age: 61
End: 2021-12-13

## 2021-12-13 NOTE — TELEPHONE ENCOUNTER
From: Madhavi Baires  To: Physician Charli Sanabria  Sent: 12/13/2021 8:04 AM PST  Subject: mammogram    Hello-    I just recently had my mammogram and they want another,    I read the report but not sure what they were saying, can you sum that up for me. seems like they just want additional views to re-check something...?    thank you- Teddy

## 2022-01-05 ENCOUNTER — EH NON-PROVIDER (OUTPATIENT)
Dept: OCCUPATIONAL MEDICINE | Facility: CLINIC | Age: 62
End: 2022-01-05
Payer: COMMERCIAL

## 2022-01-05 ENCOUNTER — HOSPITAL ENCOUNTER (OUTPATIENT)
Facility: MEDICAL CENTER | Age: 62
End: 2022-01-05
Attending: NURSE PRACTITIONER
Payer: COMMERCIAL

## 2022-01-05 DIAGNOSIS — Z11.59 ENCOUNTER FOR SCREENING FOR OTHER VIRAL DISEASES: Primary | ICD-10-CM

## 2022-01-05 PROCEDURE — U0005 INFEC AGEN DETEC AMPLI PROBE: HCPCS

## 2022-01-05 PROCEDURE — U0003 INFECTIOUS AGENT DETECTION BY NUCLEIC ACID (DNA OR RNA); SEVERE ACUTE RESPIRATORY SYNDROME CORONAVIRUS 2 (SARS-COV-2) (CORONAVIRUS DISEASE [COVID-19]), AMPLIFIED PROBE TECHNIQUE, MAKING USE OF HIGH THROUGHPUT TECHNOLOGIES AS DESCRIBED BY CMS-2020-01-R: HCPCS

## 2022-01-06 ENCOUNTER — TELEPHONE (OUTPATIENT)
Dept: PHYSICAL THERAPY | Facility: REHABILITATION | Age: 62
End: 2022-01-06

## 2022-01-06 DIAGNOSIS — Z11.59 ENCOUNTER FOR SCREENING FOR OTHER VIRAL DISEASES: ICD-10-CM

## 2022-01-06 LAB — COVID ORDER STATUS COVID19: NORMAL

## 2022-01-06 NOTE — OP THERAPY DISCHARGE SUMMARY
Outpatient Physical Therapy  DISCHARGE SUMMARY NOTE      [facility]  [address], [address]  [facility] [address]  Phone:  [phone]  Fax:  [phone]    Date of Visit: 01/06/2022    Patient: Teddy Baires  YOB: 1960  MRN: [id number]     Referring Provider: IAN Santizo   Referring Diagnosis Radiculopathy, lumbar region [M54.16]       Your patient is being discharged from Physical Therapy with the following comments:   · Patient has failed to schedule or reschedule follow-up visits    Comments:  Madhavi Baires has been discharged due to a lapse in care greater than 30 days. Patient was last seen by Dorene Pedroza PT, DPT on 2/28/2020. Thank you for the opportunity to assist you and your patient.       Limitations Remaining:  unknown    Recommendations:  F/u with PCP as needed    Arturo Hillman PT, DPT    Date: 1/6/2022

## 2022-01-07 LAB
SARS-COV-2 RNA RESP QL NAA+PROBE: NOTDETECTED
SPECIMEN SOURCE: NORMAL

## 2022-01-17 ENCOUNTER — APPOINTMENT (OUTPATIENT)
Dept: RADIOLOGY | Facility: MEDICAL CENTER | Age: 62
End: 2022-01-17
Attending: FAMILY MEDICINE
Payer: COMMERCIAL

## 2022-01-20 SDOH — ECONOMIC STABILITY: FOOD INSECURITY: WITHIN THE PAST 12 MONTHS, YOU WORRIED THAT YOUR FOOD WOULD RUN OUT BEFORE YOU GOT MONEY TO BUY MORE.: NEVER TRUE

## 2022-01-20 SDOH — HEALTH STABILITY: PHYSICAL HEALTH: ON AVERAGE, HOW MANY MINUTES DO YOU ENGAGE IN EXERCISE AT THIS LEVEL?: 20 MIN

## 2022-01-20 SDOH — ECONOMIC STABILITY: FOOD INSECURITY: WITHIN THE PAST 12 MONTHS, THE FOOD YOU BOUGHT JUST DIDN'T LAST AND YOU DIDN'T HAVE MONEY TO GET MORE.: NEVER TRUE

## 2022-01-20 SDOH — ECONOMIC STABILITY: INCOME INSECURITY: HOW HARD IS IT FOR YOU TO PAY FOR THE VERY BASICS LIKE FOOD, HOUSING, MEDICAL CARE, AND HEATING?: SOMEWHAT HARD

## 2022-01-20 SDOH — ECONOMIC STABILITY: INCOME INSECURITY: IN THE LAST 12 MONTHS, WAS THERE A TIME WHEN YOU WERE NOT ABLE TO PAY THE MORTGAGE OR RENT ON TIME?: NO

## 2022-01-20 SDOH — ECONOMIC STABILITY: HOUSING INSECURITY
IN THE LAST 12 MONTHS, WAS THERE A TIME WHEN YOU DID NOT HAVE A STEADY PLACE TO SLEEP OR SLEPT IN A SHELTER (INCLUDING NOW)?: NO

## 2022-01-20 SDOH — HEALTH STABILITY: PHYSICAL HEALTH: ON AVERAGE, HOW MANY DAYS PER WEEK DO YOU ENGAGE IN MODERATE TO STRENUOUS EXERCISE (LIKE A BRISK WALK)?: 3 DAYS

## 2022-01-20 SDOH — ECONOMIC STABILITY: TRANSPORTATION INSECURITY
IN THE PAST 12 MONTHS, HAS THE LACK OF TRANSPORTATION KEPT YOU FROM MEDICAL APPOINTMENTS OR FROM GETTING MEDICATIONS?: NO

## 2022-01-20 SDOH — ECONOMIC STABILITY: TRANSPORTATION INSECURITY
IN THE PAST 12 MONTHS, HAS LACK OF TRANSPORTATION KEPT YOU FROM MEETINGS, WORK, OR FROM GETTING THINGS NEEDED FOR DAILY LIVING?: NO

## 2022-01-20 SDOH — ECONOMIC STABILITY: HOUSING INSECURITY: IN THE LAST 12 MONTHS, HOW MANY PLACES HAVE YOU LIVED?: 1

## 2022-01-20 ASSESSMENT — SOCIAL DETERMINANTS OF HEALTH (SDOH)
IN A TYPICAL WEEK, HOW MANY TIMES DO YOU TALK ON THE PHONE WITH FAMILY, FRIENDS, OR NEIGHBORS?: MORE THAN THREE TIMES A WEEK
DO YOU BELONG TO ANY CLUBS OR ORGANIZATIONS SUCH AS CHURCH GROUPS UNIONS, FRATERNAL OR ATHLETIC GROUPS, OR SCHOOL GROUPS?: NO
HOW OFTEN DO YOU ATTEND CHURCH OR RELIGIOUS SERVICES?: NEVER
HOW OFTEN DO YOU ATTENT MEETINGS OF THE CLUB OR ORGANIZATION YOU BELONG TO?: NEVER
HOW OFTEN DO YOU GET TOGETHER WITH FRIENDS OR RELATIVES?: NEVER

## 2022-01-20 ASSESSMENT — LIFESTYLE VARIABLES
HOW OFTEN DO YOU HAVE SIX OR MORE DRINKS ON ONE OCCASION: NEVER
HOW OFTEN DO YOU HAVE A DRINK CONTAINING ALCOHOL: NEVER

## 2022-02-01 ENCOUNTER — APPOINTMENT (OUTPATIENT)
Dept: RADIOLOGY | Facility: MEDICAL CENTER | Age: 62
End: 2022-02-01
Attending: FAMILY MEDICINE
Payer: COMMERCIAL

## 2022-02-07 ENCOUNTER — OFFICE VISIT (OUTPATIENT)
Dept: MEDICAL GROUP | Age: 62
End: 2022-02-07
Payer: COMMERCIAL

## 2022-02-07 VITALS
HEIGHT: 66 IN | TEMPERATURE: 96.7 F | SYSTOLIC BLOOD PRESSURE: 110 MMHG | WEIGHT: 145 LBS | DIASTOLIC BLOOD PRESSURE: 68 MMHG | BODY MASS INDEX: 23.3 KG/M2 | OXYGEN SATURATION: 98 % | HEART RATE: 69 BPM

## 2022-02-07 DIAGNOSIS — J32.9 CHRONIC SINUSITIS, UNSPECIFIED LOCATION: ICD-10-CM

## 2022-02-07 DIAGNOSIS — G89.29 CHRONIC RIGHT SHOULDER PAIN: ICD-10-CM

## 2022-02-07 DIAGNOSIS — M25.511 CHRONIC RIGHT SHOULDER PAIN: ICD-10-CM

## 2022-02-07 DIAGNOSIS — H69.92 DYSFUNCTION OF LEFT EUSTACHIAN TUBE: ICD-10-CM

## 2022-02-07 DIAGNOSIS — E78.2 MIXED HYPERLIPIDEMIA: ICD-10-CM

## 2022-02-07 DIAGNOSIS — R43.0 LOSS OF SMELL: ICD-10-CM

## 2022-02-07 DIAGNOSIS — Z11.59 NEED FOR HEPATITIS C SCREENING TEST: ICD-10-CM

## 2022-02-07 DIAGNOSIS — Z76.89 ESTABLISHING CARE WITH NEW DOCTOR, ENCOUNTER FOR: ICD-10-CM

## 2022-02-07 DIAGNOSIS — Z00.00 BLOOD TESTS FOR ROUTINE GENERAL PHYSICAL EXAMINATION: ICD-10-CM

## 2022-02-07 PROCEDURE — 99214 OFFICE O/P EST MOD 30 MIN: CPT | Performed by: PHYSICIAN ASSISTANT

## 2022-02-07 SDOH — ECONOMIC STABILITY: INCOME INSECURITY: HOW HARD IS IT FOR YOU TO PAY FOR THE VERY BASICS LIKE FOOD, HOUSING, MEDICAL CARE, AND HEATING?: SOMEWHAT HARD

## 2022-02-07 SDOH — HEALTH STABILITY: PHYSICAL HEALTH: ON AVERAGE, HOW MANY DAYS PER WEEK DO YOU ENGAGE IN MODERATE TO STRENUOUS EXERCISE (LIKE A BRISK WALK)?: 3 DAYS

## 2022-02-07 SDOH — ECONOMIC STABILITY: HOUSING INSECURITY: IN THE LAST 12 MONTHS, HOW MANY PLACES HAVE YOU LIVED?: 1

## 2022-02-07 SDOH — ECONOMIC STABILITY: FOOD INSECURITY: WITHIN THE PAST 12 MONTHS, THE FOOD YOU BOUGHT JUST DIDN'T LAST AND YOU DIDN'T HAVE MONEY TO GET MORE.: NEVER TRUE

## 2022-02-07 SDOH — ECONOMIC STABILITY: TRANSPORTATION INSECURITY
IN THE PAST 12 MONTHS, HAS LACK OF RELIABLE TRANSPORTATION KEPT YOU FROM MEDICAL APPOINTMENTS, MEETINGS, WORK OR FROM GETTING THINGS NEEDED FOR DAILY LIVING?: NO

## 2022-02-07 SDOH — ECONOMIC STABILITY: FOOD INSECURITY: WITHIN THE PAST 12 MONTHS, YOU WORRIED THAT YOUR FOOD WOULD RUN OUT BEFORE YOU GOT MONEY TO BUY MORE.: NEVER TRUE

## 2022-02-07 SDOH — HEALTH STABILITY: PHYSICAL HEALTH: ON AVERAGE, HOW MANY MINUTES DO YOU ENGAGE IN EXERCISE AT THIS LEVEL?: 20 MIN

## 2022-02-07 SDOH — HEALTH STABILITY: MENTAL HEALTH
STRESS IS WHEN SOMEONE FEELS TENSE, NERVOUS, ANXIOUS, OR CAN'T SLEEP AT NIGHT BECAUSE THEIR MIND IS TROUBLED. HOW STRESSED ARE YOU?: ONLY A LITTLE

## 2022-02-07 SDOH — ECONOMIC STABILITY: INCOME INSECURITY: IN THE LAST 12 MONTHS, WAS THERE A TIME WHEN YOU WERE NOT ABLE TO PAY THE MORTGAGE OR RENT ON TIME?: NO

## 2022-02-07 ASSESSMENT — SOCIAL DETERMINANTS OF HEALTH (SDOH)
HOW OFTEN DO YOU GET TOGETHER WITH FRIENDS OR RELATIVES?: NEVER
WITHIN THE PAST 12 MONTHS, YOU WORRIED THAT YOUR FOOD WOULD RUN OUT BEFORE YOU GOT THE MONEY TO BUY MORE: NEVER TRUE
HOW OFTEN DO YOU ATTENT MEETINGS OF THE CLUB OR ORGANIZATION YOU BELONG TO?: NEVER
HOW HARD IS IT FOR YOU TO PAY FOR THE VERY BASICS LIKE FOOD, HOUSING, MEDICAL CARE, AND HEATING?: SOMEWHAT HARD
HOW OFTEN DO YOU ATTEND CHURCH OR RELIGIOUS SERVICES?: NEVER
IN A TYPICAL WEEK, HOW MANY TIMES DO YOU TALK ON THE PHONE WITH FAMILY, FRIENDS, OR NEIGHBORS?: MORE THAN THREE TIMES A WEEK
DO YOU BELONG TO ANY CLUBS OR ORGANIZATIONS SUCH AS CHURCH GROUPS UNIONS, FRATERNAL OR ATHLETIC GROUPS, OR SCHOOL GROUPS?: NO
IN A TYPICAL WEEK, HOW MANY TIMES DO YOU TALK ON THE PHONE WITH FAMILY, FRIENDS, OR NEIGHBORS?: MORE THAN THREE TIMES A WEEK
HOW OFTEN DO YOU ATTENT MEETINGS OF THE CLUB OR ORGANIZATION YOU BELONG TO?: NEVER
HOW OFTEN DO YOU ATTEND CHURCH OR RELIGIOUS SERVICES?: NEVER
HOW OFTEN DO YOU HAVE SIX OR MORE DRINKS ON ONE OCCASION: NEVER
HOW OFTEN DO YOU GET TOGETHER WITH FRIENDS OR RELATIVES?: NEVER
DO YOU BELONG TO ANY CLUBS OR ORGANIZATIONS SUCH AS CHURCH GROUPS UNIONS, FRATERNAL OR ATHLETIC GROUPS, OR SCHOOL GROUPS?: NO
HOW OFTEN DO YOU HAVE A DRINK CONTAINING ALCOHOL: NEVER

## 2022-02-07 ASSESSMENT — PATIENT HEALTH QUESTIONNAIRE - PHQ9
SUM OF ALL RESPONSES TO PHQ QUESTIONS 1-9: 7
CLINICAL INTERPRETATION OF PHQ2 SCORE: 2
5. POOR APPETITE OR OVEREATING: 0 - NOT AT ALL

## 2022-02-07 NOTE — PROGRESS NOTES
cc: [facility], right shoulder pain, chronic nasal congestion    Subjective:     HPI    Madhavi Baires is a 61 y.o. female presenting to [facility]. Is been about 4 months since she has been seen by primary care provider. 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 She did have an abnormal mammogram, scheduled for diagnostic mammogram in 2 weeks. She is currently followed by gynecology and is up-to-date on her Pap.    Chronic nasal congestion  She has been struggling with this for over 30 years. She has fairly consistent nasal congestion, with intermittent sinus pressure and sinus infections. She was previously seen by ENT over 5 years ago. Would like to reestablish. She does have loss of smell, this is not new and has been ongoing for many years. However, now when she is eating she finds it difficult to breathe through her nose feels it is at least 50% struct of the majority of the time. She does use Claritin and nasal sprays with minimal improvement. Currently denies sinus pressure, ear pain, sore throat, fever, chills, sweats.    Shoulder pain  For the past few years she has had intermittent right shoulder pain, now has progressed almost daily. It is most bothersome at night or when she is throwing the ball for her dog. She does not note any specific trauma. Has not take anything over-the-counter. Does not note decreased range of motion, but has pain with abduction and internal rotation. Does seem to be more tender on the anterior portion of her shoulder. Denies neck pain, swelling, erythema, edema, warmth.    Review of systems:  See above.     No current outpatient medications on file.    Allergies, past medical history, past surgical history, family history, social history reviewed and updated    Objective:     Vitals: /68 (BP Location: Left arm, Patient Position: Sitting, BP Cuff Size: Adult)   Pulse 69   Temp 35.9 °C (96.7 °F) (Temporal)   Ht 1.676 m (5'  "6\")   Wt 65.8 kg (145 lb)   LMP 03/09/2015   SpO2 98%   BMI 23.40 kg/m²   General: Alert, pleasant, NAD  HEENT: Normocephalic. TMs gray bilaterally. Minimal maxillary sinus pressure tenderness. Normal posterior oropharynx. Uvula midline. No tonsillar enlargement or exudate. Neck supple.  No thyromegaly or masses palpated. No cervical or supraclavicular lymphadenopathy. No carotid bruits   Heart: Regular rate and rhythm.  S1 and S2 normal.  No murmurs appreciated.  Respiratory: Normal respiratory effort.  Clear to auscultation bilaterally.  Shoulder: Full range of motion, pain elicited with abduction and internal rotation. Moderate tenderness to the anterior portion of the shoulder. Positive empty can test.  Skin: Warm, dry, no rashes.  Extremities: No leg edema.  Radial pulses 2+ symmetric  Psych:  Affect/mood is normal, judgement is good, memory is intact, grooming is appropriate.    Assessment/Plan:     Madhavi was seen today for establish care, shoulder pain and sinus problem.    Diagnoses and all orders for this visit:    Chronic right shoulder pain  -Pain has progressively been getting worse, no specific injury. Will obtain x-ray. She did have positive empty can test, possibly rotator cuff injury. Advised rest, ibuprofen, ice as needed. Referral placed to orthopedics for further evaluation.  -     DX-SHOULDER 2+ RIGHT; Future  -     Referral to Orthopedics    Chronic sinusitis, unspecified location  -Chronic issue, now she is having difficulties breathing while eating. Recommended  Sudafed with Claritin and nasal saline sprays, [place]. Referral placed to ENT for further evaluation.  -     Referral to ENT    Loss of smell  -Chronic. Referral placed to ENT  -     Referral to ENT    Dysfunction of left eustachian tube  -Continue with antihistamines as needed  -     Referral to ENT    Mixed hyperlipidemia  -LDL previously mildly elevated. Currently not on statins. Will check below lab. Further treatment " as needed pending results  -     Comp Metabolic Panel; Future  -     Lipid Profile; Future    Need for hepatitis C screening test  -     HCV Scrn ( [id number] [employer]); Future    Establishing care with new doctor, encounter for  -We'll request records and review when received. She is scheduled for diagnostic mammogram in 2 weeks. Further treatment as needed pending results.    Blood tests for routine general physical examination  -     CBC WITH DIFFERENTIAL; Future  -     Comp Metabolic Panel; Future  -     Lipid Profile; Future  -     TSH WITH REFLEX TO FT4; Future        Return in about 6 weeks (around 3/21/2022) for Annual PX, Lab Review.

## 2022-02-16 ENCOUNTER — HOSPITAL ENCOUNTER (OUTPATIENT)
Dept: RADIOLOGY | Facility: MEDICAL CENTER | Age: 62
End: 2022-02-16
Attending: PHYSICIAN ASSISTANT
Payer: COMMERCIAL

## 2022-02-16 DIAGNOSIS — M25.511 CHRONIC RIGHT SHOULDER PAIN: ICD-10-CM

## 2022-02-16 DIAGNOSIS — G89.29 CHRONIC RIGHT SHOULDER PAIN: ICD-10-CM

## 2022-02-16 PROCEDURE — 73030 X-RAY EXAM OF SHOULDER: CPT | Mod: RT

## 2022-02-24 ENCOUNTER — HOSPITAL ENCOUNTER (OUTPATIENT)
Dept: LAB | Facility: MEDICAL CENTER | Age: 62
End: 2022-02-24
Attending: PHYSICIAN ASSISTANT
Payer: COMMERCIAL

## 2022-02-24 DIAGNOSIS — Z11.59 NEED FOR HEPATITIS C SCREENING TEST: ICD-10-CM

## 2022-02-24 DIAGNOSIS — E78.2 MIXED HYPERLIPIDEMIA: ICD-10-CM

## 2022-02-24 DIAGNOSIS — Z00.00 BLOOD TESTS FOR ROUTINE GENERAL PHYSICAL EXAMINATION: ICD-10-CM

## 2022-02-24 LAB
ALBUMIN SERPL BCP-MCNC: 4.2 G/DL (ref 3.2–4.9)
ALBUMIN/GLOB SERPL: 1.3 G/DL
ALP SERPL-CCNC: 80 U/L (ref 30–99)
ALT SERPL-CCNC: 20 U/L (ref 2–50)
ANION GAP SERPL CALC-SCNC: 9 MMOL/L (ref 7–16)
AST SERPL-CCNC: 21 U/L (ref 12–45)
BASOPHILS # BLD AUTO: 1.1 % (ref 0–1.8)
BASOPHILS # BLD: 0.05 K/UL (ref 0–0.12)
BILIRUB SERPL-MCNC: 0.4 MG/DL (ref 0.1–1.5)
BUN SERPL-MCNC: 22 MG/DL (ref 8–22)
CALCIUM SERPL-MCNC: 8.9 MG/DL (ref 8.5–10.5)
CHLORIDE SERPL-SCNC: 105 MMOL/L (ref 96–112)
CHOLEST SERPL-MCNC: 219 MG/DL (ref 100–199)
CO2 SERPL-SCNC: 26 MMOL/L (ref 20–33)
CREAT SERPL-MCNC: 0.61 MG/DL (ref 0.5–1.4)
EOSINOPHIL # BLD AUTO: 0.09 K/UL (ref 0–0.51)
EOSINOPHIL NFR BLD: 2 % (ref 0–6.9)
ERYTHROCYTE [DISTWIDTH] IN BLOOD BY AUTOMATED COUNT: 45 FL (ref 35.9–50)
GLOBULIN SER CALC-MCNC: 3.3 G/DL (ref 1.9–3.5)
GLUCOSE SERPL-MCNC: 93 MG/DL (ref 65–99)
HCT VFR BLD AUTO: 45.9 % (ref 37–47)
HCV AB SER QL: NORMAL
HDLC SERPL-MCNC: 62 MG/DL
HGB BLD-MCNC: 15 G/DL (ref 12–16)
IMM GRANULOCYTES # BLD AUTO: 0.01 K/UL (ref 0–0.11)
IMM GRANULOCYTES NFR BLD AUTO: 0.2 % (ref 0–0.9)
LDLC SERPL CALC-MCNC: 140 MG/DL
LYMPHOCYTES # BLD AUTO: 1.67 K/UL (ref 1–4.8)
LYMPHOCYTES NFR BLD: 36.5 % (ref 22–41)
MCH RBC QN AUTO: 29.8 PG (ref 27–33)
MCHC RBC AUTO-ENTMCNC: 32.7 G/DL (ref 33.6–35)
MCV RBC AUTO: 91.3 FL (ref 81.4–97.8)
MONOCYTES # BLD AUTO: 0.46 K/UL (ref 0–0.85)
MONOCYTES NFR BLD AUTO: 10.1 % (ref 0–13.4)
NEUTROPHILS # BLD AUTO: 2.29 K/UL (ref 2–7.15)
NEUTROPHILS NFR BLD: 50.1 % (ref 44–72)
NRBC # BLD AUTO: 0 K/UL
NRBC BLD-RTO: 0 /100 WBC
PLATELET # BLD AUTO: 251 K/UL (ref 164–446)
PMV BLD AUTO: 11.6 FL (ref 9–12.9)
POTASSIUM SERPL-SCNC: 4.1 MMOL/L (ref 3.6–5.5)
PROT SERPL-MCNC: 7.5 G/DL (ref 6–8.2)
RBC # BLD AUTO: 5.03 M/UL (ref 4.2–5.4)
SODIUM SERPL-SCNC: 140 MMOL/L (ref 135–145)
TRIGL SERPL-MCNC: 84 MG/DL (ref 0–149)
TSH SERPL DL<=0.005 MIU/L-ACNC: 1.35 UIU/ML (ref 0.38–5.33)
WBC # BLD AUTO: 4.6 K/UL (ref 4.8–10.8)

## 2022-02-24 PROCEDURE — 80061 LIPID PANEL: CPT

## 2022-02-24 PROCEDURE — 80053 COMPREHEN METABOLIC PANEL: CPT

## 2022-02-24 PROCEDURE — 36415 COLL VENOUS BLD VENIPUNCTURE: CPT

## 2022-02-24 PROCEDURE — G0472 HEP C SCREEN HIGH RISK/OTHER: HCPCS

## 2022-02-24 PROCEDURE — 85025 COMPLETE CBC W/AUTO DIFF WBC: CPT

## 2022-02-24 PROCEDURE — 84443 ASSAY THYROID STIM HORMONE: CPT

## 2022-02-25 ENCOUNTER — APPOINTMENT (OUTPATIENT)
Dept: RADIOLOGY | Facility: MEDICAL CENTER | Age: 62
End: 2022-02-25
Attending: FAMILY MEDICINE
Payer: COMMERCIAL

## 2022-03-10 ENCOUNTER — HOSPITAL ENCOUNTER (OUTPATIENT)
Dept: RADIOLOGY | Facility: MEDICAL CENTER | Age: 62
End: 2022-03-10
Attending: FAMILY MEDICINE
Payer: COMMERCIAL

## 2022-03-10 DIAGNOSIS — R92.8 ABNORMAL MAMMOGRAM: ICD-10-CM

## 2022-03-10 PROCEDURE — 76642 ULTRASOUND BREAST LIMITED: CPT | Mod: RT

## 2022-03-10 PROCEDURE — G0279 TOMOSYNTHESIS, MAMMO: HCPCS

## 2022-05-02 ENCOUNTER — OFFICE VISIT (OUTPATIENT)
Dept: MEDICAL GROUP | Age: 62
End: 2022-05-02
Payer: COMMERCIAL

## 2022-05-02 VITALS
BODY MASS INDEX: 23.95 KG/M2 | DIASTOLIC BLOOD PRESSURE: 64 MMHG | TEMPERATURE: 99.1 F | HEART RATE: 67 BPM | OXYGEN SATURATION: 100 % | SYSTOLIC BLOOD PRESSURE: 116 MMHG | WEIGHT: 149 LBS | HEIGHT: 66 IN

## 2022-05-02 DIAGNOSIS — R14.0 ABDOMINAL BLOATING: ICD-10-CM

## 2022-05-02 DIAGNOSIS — E78.2 MIXED HYPERLIPIDEMIA: ICD-10-CM

## 2022-05-02 PROCEDURE — 99213 OFFICE O/P EST LOW 20 MIN: CPT | Performed by: PHYSICIAN ASSISTANT

## 2022-05-02 ASSESSMENT — FIBROSIS 4 INDEX: FIB4 SCORE: 1.16

## 2022-05-03 NOTE — PROGRESS NOTES
cc: Lab review and abdominal bloating    Subjective:     [facility]    Madhavi Baires is a 61 y.o. female presenting with concerns of abdominal bloating.  She states that onset about 10 days ago.  She is currently going through a particularly stressful time at work, which is giving her a lot of anxiety.  Feels like there is a knot in her stomach.  She does just in general feel bloated.  Intermittently has some pain after eating, but this is not consistent.  She has tried Tums, with minimal improvement.  Denies melena, hematochezia, nausea, vomiting, diarrhea, constipation.    Results for JARAD BAIRES (MRN [id number]) as of 5/2/2022 17:03   Ref. Range 2/24/2022 06:16   WBC Latest Ref Range: 4.8 - 10.8 K/uL 4.6 (L)   RBC Latest Ref Range: 4.20 - 5.40 M/uL 5.03   Hemoglobin Latest Ref Range: 12.0 - 16.0 g/dL 15.0   Hematocrit Latest Ref Range: 37.0 - 47.0 % 45.9   MCV Latest Ref Range: 81.4 - 97.8 fL 91.3   MCH Latest Ref Range: 27.0 - 33.0 pg 29.8   MCHC Latest Ref Range: 33.6 - 35.0 g/dL 32.7 (L)   RDW Latest Ref Range: 35.9 - 50.0 fL 45.0   Platelet Count Latest Ref Range: 164 - 446 K/uL 251   MPV Latest Ref Range: 9.0 - 12.9 fL 11.6   Neutrophils-Polys Latest Ref Range: 44.00 - 72.00 % 50.10   Neutrophils (Absolute) Latest Ref Range: 2.00 - 7.15 K/uL 2.29   Lymphocytes Latest Ref Range: 22.00 - 41.00 % 36.50   Lymphs (Absolute) Latest Ref Range: 1.00 - 4.80 K/uL 1.67   Monocytes Latest Ref Range: 0.00 - 13.40 % 10.10   Monos (Absolute) Latest Ref Range: 0.00 - 0.85 K/uL 0.46   Eosinophils Latest Ref Range: 0.00 - 6.90 % 2.00   Eos (Absolute) Latest Ref Range: 0.00 - 0.51 K/uL 0.09   Basophils Latest Ref Range: 0.00 - 1.80 % 1.10   Baso (Absolute) Latest Ref Range: 0.00 - 0.12 K/uL 0.05   Immature Granulocytes Latest Ref Range: 0.00 - 0.90 % 0.20   Immature Granulocytes (abs) Latest Ref Range: 0.00 - 0.11 K/uL 0.01   Nucleated RBC Latest Units: /100 WBC 0.00   NRBC (Absolute) Latest Units: K/uL 0.00  "  Sodium Latest Ref Range: 135 - 145 mmol/L 140   Potassium Latest Ref Range: 3.6 - 5.5 mmol/L 4.1   Chloride Latest Ref Range: 96 - 112 mmol/L 105   Co2 Latest Ref Range: 20 - 33 mmol/L 26   Anion Gap Latest Ref Range: 7.0 - 16.0  9.0   Glucose Latest Ref Range: 65 - 99 mg/dL 93   Bun Latest Ref Range: 8 - 22 mg/dL 22   Creatinine Latest Ref Range: 0.50 - 1.40 mg/dL 0.61   GFR If  Latest Ref Range: >60 mL/min/1.73 m 2 >60   GFR If Non  Latest Ref Range: >60 mL/min/1.73 m 2 >60   Calcium Latest Ref Range: 8.5 - 10.5 mg/dL 8.9   AST(SGOT) Latest Ref Range: 12 - 45 U/L 21   ALT(SGPT) Latest Ref Range: 2 - 50 U/L 20   Alkaline Phosphatase Latest Ref Range: 30 - 99 U/L 80   Total Bilirubin Latest Ref Range: 0.1 - 1.5 mg/dL 0.4   Albumin Latest Ref Range: 3.2 - 4.9 g/dL 4.2   Total Protein Latest Ref Range: 6.0 - 8.2 g/dL 7.5   Globulin Latest Ref Range: 1.9 - 3.5 g/dL 3.3   A-G Ratio Latest Units: g/dL 1.3   Cholesterol,Tot Latest Ref Range: 100 - 199 mg/dL 219 (H)   Triglycerides Latest Ref Range: 0 - 149 mg/dL 84   HDL Latest Ref Range: >=40 mg/dL 62   LDL Latest Ref Range: <100 mg/dL 140 (H)   TSH Latest Ref Range: 0.380 - 5.330 uIU/mL 1.350   Hepatitis C Antibody Latest Ref Range: Non-Reactive  Non-Reactive       Review of systems:  See above.       Current Outpatient Medications:   •  Fluticasone Propionate (FLONASE NA), Administer  into affected nostril(S)., Disp: , Rfl:     Allergies, past medical history, past surgical history, family history, social history reviewed and updated    Objective:     Vitals: /64 (BP Location: Right arm, Patient Position: Sitting, BP Cuff Size: Adult)   Pulse 67   Temp 37.3 °C (99.1 °F) (Temporal)   Ht 1.676 m (5' 6\")   Wt 67.6 kg (149 lb)   LMP 03/09/2015   SpO2 100%   BMI 24.05 kg/m²   General: Alert, pleasant, NAD  HEENT: Normocephalic. Neck supple.  No thyromegaly or masses palpated. No cervical or supraclavicular lymphadenopathy. No " carotid bruits   Heart: Regular rate and rhythm.  S1 and S2 normal.  No murmurs appreciated.  Respiratory: Normal respiratory effort.  Clear to auscultation bilaterally.  Abdomen:  Normoactive bowel sounds.  Non-distended, soft, non-tender, no guarding/rebound. No hepatosplenomegaly.  No hernias.   Skin: Warm, dry, no rashes.  Extremities: No leg edema.  Radial pulses 2+ symmetric  Psych:  Affect/mood is normal, judgement is good, memory is intact, grooming is appropriate.    Assessment/Plan:     Madhavi was seen today for gi problem.    Diagnoses and all orders for this visit:    Abdominal bloating  -Onset about 10 days ago, associated with increased stress.  Abdominal exam is benign, suspicion for ulcer is very low.  Advised trial of omeprazole daily.  We will also screen for H. pylori.  Further treatment as needed pending results.  If she has worsening or no improvement, then follow-up.  -     H. PYLORI BREATH TEST    Mixed hyperlipidemia  -Mildly elevated.  Reviewed lifestyle modifications.      Return in about 4 weeks (around 5/30/2022) for Annual PX.

## 2022-07-22 ENCOUNTER — OFFICE VISIT (OUTPATIENT)
Dept: URGENT CARE | Facility: PHYSICIAN GROUP | Age: 62
End: 2022-07-22
Payer: COMMERCIAL

## 2022-07-22 VITALS
DIASTOLIC BLOOD PRESSURE: 80 MMHG | BODY MASS INDEX: 23.63 KG/M2 | RESPIRATION RATE: 16 BRPM | HEART RATE: 63 BPM | WEIGHT: 147 LBS | HEIGHT: 66 IN | OXYGEN SATURATION: 96 % | SYSTOLIC BLOOD PRESSURE: 116 MMHG | TEMPERATURE: 97.8 F

## 2022-07-22 DIAGNOSIS — J01.00 ACUTE MAXILLARY SINUSITIS, RECURRENCE NOT SPECIFIED: ICD-10-CM

## 2022-07-22 PROCEDURE — 99213 OFFICE O/P EST LOW 20 MIN: CPT | Performed by: NURSE PRACTITIONER

## 2022-07-22 RX ORDER — AMOXICILLIN AND CLAVULANATE POTASSIUM 875; 125 MG/1; MG/1
1 TABLET, FILM COATED ORAL 2 TIMES DAILY
Qty: 14 TABLET | Refills: 0 | Status: SHIPPED | OUTPATIENT
Start: 2022-07-22 | End: 2022-07-29

## 2022-07-22 ASSESSMENT — ENCOUNTER SYMPTOMS
NAUSEA: 0
SHORTNESS OF BREATH: 0
MYALGIAS: 0
EYE REDNESS: 0
EYE DISCHARGE: 0
VOMITING: 0
DIZZINESS: 0
WHEEZING: 0
FEVER: 0
CONSTIPATION: 0
SORE THROAT: 0
WEAKNESS: 0
DIARRHEA: 0
NECK PAIN: 0
COUGH: 0
SINUS PAIN: 1
ABDOMINAL PAIN: 0
HEADACHES: 0
CHILLS: 0

## 2022-07-22 ASSESSMENT — FIBROSIS 4 INDEX: FIB4 SCORE: 1.18

## 2022-07-22 NOTE — PROGRESS NOTES
Subjective     Teddy Baires is a 62 y.o. female who presents with Sinus Problem (Sinus pressure and nausea x 1 week.)            HPI  Has been experiencing maxillary facial pressure x1 week.  Postnasal drainage causing mild nausea with swallowing.  States will get sinus infections with facial pressure.  Using Flonase and has saline nasal rinse.  No noted fever or body aches but has fatigue.  Took at home COVID test today: Negative.    PMH:  has a past medical history of Allergic rhinitis (3/5/2013), Allergic rhinitis, Chronic rhinitis (3/5/2013), and Indigestion.    She has no past medical history of Addisons disease ([facility]), Adrenal disorder ([facility]), Anemia, Anxiety, Arrhythmia, Arthritis, Asthma, Blood transfusion without reported diagnosis, Cancer ([facility]), Cataract, CHF (congestive heart failure) ([facility]), Clotting disorder ([facility]), COPD (chronic obstructive pulmonary disease) ([facility]), Cushings syndrome ([facility]), Depression, Diabetes ([facility]), Diabetic neuropathy ([facility]), GERD (gastroesophageal reflux disease), Glaucoma, Goiter, Head ache, Heart attack ([facility]), Heart murmur, HIV (human immunodeficiency virus infection) ([facility]), Hyperlipidemia, Hypertension, IBD (inflammatory bowel disease), Kidney disease, Meningitis, Migraine, Muscle disorder, Osteoporosis, Parathyroid disorder ([facility]), Pituitary disease ([facility]), Pulmonary emphysema ([facility]), Seizure ([facility]), Sickle cell disease ([facility]), Stroke ([facility]), Substance abuse ([facility]), Thyroid disease, Tuberculosis, or Urinary tract infection.  MEDS:   Current Outpatient Medications:   •  amoxicillin-clavulanate (AUGMENTIN) 875-125 MG Tab, Take 1 Tablet by mouth 2 times a day for 7 days., Disp: 14 Tablet, Rfl: 0  •  Fluticasone Propionate (FLONASE NA), Administer  into affected nostril(S). (Patient not taking: Reported on 7/22/2022), Disp: , Rfl:   ALLERGIES:   Allergies   Allergen Reactions   • Azithromycin Rash     Z-PACK     SURGHX:   Past Surgical History:   Procedure Laterality Date   • OTHER   "2004?    breast implants   • OTHER      foregin body removal on toe   • [place] BREAST AUGMENTATION WITH IMPLANT Bilateral    • TONSILLECTOMY       SOCHX:  reports that she has never smoked. She has never used smokeless tobacco. She reports that she does not drink alcohol and does not use drugs.  FH: Family history was reviewed, no pertinent findings to report    Review of Systems   Constitutional: Negative for chills, fever and malaise/fatigue.   HENT: Positive for congestion, ear pain and sinus pain. Negative for sore throat.    Eyes: Negative for discharge and redness.   Respiratory: Negative for cough, shortness of breath and wheezing.    Gastrointestinal: Negative for abdominal pain, constipation, diarrhea, nausea and vomiting.   Musculoskeletal: Negative for myalgias and neck pain.   Skin: Negative for itching and rash.   Neurological: Negative for dizziness, weakness and headaches.   Endo/Heme/Allergies: Negative for environmental allergies.   All other systems reviewed and are negative.             Objective     /80   Pulse 63   Temp 36.6 °C (97.8 °F) (Temporal)   Resp 16   Ht 1.676 m (5' 6\")   Wt 66.7 kg (147 lb)   LMP 03/09/2015   SpO2 96%   BMI 23.73 kg/m²      Physical Exam  Vitals reviewed.   Constitutional:       General: She is awake. She is not in acute distress.     Appearance: Normal appearance. She is well-developed. She is not ill-appearing, toxic-appearing or diaphoretic.   HENT:      Head: Normocephalic.      Right Ear: Ear canal and external ear normal. A middle ear effusion is present.      Left Ear: Ear canal and external ear normal. A middle ear effusion is present.      Nose: Mucosal edema, congestion and rhinorrhea present.      Mouth/Throat:      Lips: Pink.      Mouth: Mucous membranes are dry.      Pharynx: Oropharynx is clear. Uvula midline.   Eyes:      Conjunctiva/sclera: Conjunctivae normal.      Pupils: Pupils are equal, round, and reactive to light.   Cardiovascular:     "  Rate and Rhythm: Normal rate.   Pulmonary:      Effort: Pulmonary effort is normal.   Musculoskeletal:         General: Normal range of motion.      Cervical back: Normal range of motion and neck supple.   Skin:     General: Skin is warm and dry.   Neurological:      Mental Status: She is alert and oriented to person, place, and time.   Psychiatric:         Attention and Perception: Attention normal.         Mood and Affect: Mood normal.         Speech: Speech normal.         Behavior: Behavior normal. Behavior is cooperative.                             Assessment & Plan        1. Acute maxillary sinusitis, recurrence not specified    - amoxicillin-clavulanate (AUGMENTIN) 875-125 MG Tab; Take 1 Tablet by mouth 2 times a day for 7 days.  Dispense: 14 Tablet; Refill: 0     -Increase water intake  -Get rest  -May use Ibuprofen/Tylenol as needed for any fever, body aches or throat pain  -May take longer acting antihistamine for seasonal allergy symptoms (chose one like Claritin/Zyrtec/Allegra without decongestant) x 1 week then as needed   -May use over the counter saline nasal spray for nasal congestion up to 4x/day  -May use over the counter Flonase or Nasocort for allergen nasal congestion as needed x 1 week then as needed   -May gargle with salt water as needed for throat discomfort up to 4x/day (1 tsp salt in one cup warm water)  -May drink smoothies for nutrition if too painful to swallow solid foods  -Monitor for worsening or persistent symptoms, increased facial pressure, dizziness, fever, productive cough, shortness of breath and chest pain/tightness- need re-evaluation

## 2022-08-13 ENCOUNTER — OFFICE VISIT (OUTPATIENT)
Dept: URGENT CARE | Facility: PHYSICIAN GROUP | Age: 62
End: 2022-08-13
Payer: COMMERCIAL

## 2022-08-13 VITALS
OXYGEN SATURATION: 100 % | DIASTOLIC BLOOD PRESSURE: 68 MMHG | SYSTOLIC BLOOD PRESSURE: 116 MMHG | RESPIRATION RATE: 20 BRPM | HEART RATE: 85 BPM | HEIGHT: 66 IN | BODY MASS INDEX: 22.34 KG/M2 | TEMPERATURE: 97.1 F | WEIGHT: 139 LBS

## 2022-08-13 DIAGNOSIS — H10.9 CONJUNCTIVITIS OF LEFT EYE, UNSPECIFIED CONJUNCTIVITIS TYPE: ICD-10-CM

## 2022-08-13 PROCEDURE — 99213 OFFICE O/P EST LOW 20 MIN: CPT | Performed by: PHYSICIAN ASSISTANT

## 2022-08-13 RX ORDER — OFLOXACIN 3 MG/ML
1 SOLUTION/ DROPS OPHTHALMIC 4 TIMES DAILY
Qty: 10 ML | Refills: 0 | Status: SHIPPED | OUTPATIENT
Start: 2022-08-13 | End: 2022-08-20

## 2022-08-13 ASSESSMENT — ENCOUNTER SYMPTOMS
VOMITING: 0
EYE PAIN: 1
FEVER: 0
NAUSEA: 0
DOUBLE VISION: 0
HEADACHES: 0
FOREIGN BODY SENSATION: 0
EYE DISCHARGE: 1
BLURRED VISION: 1
MYALGIAS: 0
EYE REDNESS: 1
COUGH: 0
PHOTOPHOBIA: 0
EYE ITCHING: 0

## 2022-08-13 ASSESSMENT — FIBROSIS 4 INDEX: FIB4 SCORE: 1.18

## 2022-08-13 ASSESSMENT — VISUAL ACUITY: OU: 1

## 2022-08-13 NOTE — PROGRESS NOTES
Subjective     Teddy Baires is a 62 y.o. female who presents with Eye Pain (Left- 5x days )            Eye Injury   The left eye is affected. This is a new problem. Episode onset: x 5 dyas ago. The problem occurs constantly. The problem has been unchanged. There was no injury mechanism. The patient is experiencing no pain. She Wears contacts. Associated symptoms include blurred vision (The patient reports intermittent blurred vision of the left eye.), an eye discharge (The patient reports intermittent discharge/drainage.) and eye redness (The patient reports redness to the left eye, which has slightly improved). Pertinent negatives include no double vision, fever, foreign body sensation, itching, nausea, photophobia, recent URI or vomiting. She has tried nothing for the symptoms.     PMH:  has a past medical history of Allergic rhinitis (3/5/2013), Allergic rhinitis, Chronic rhinitis (3/5/2013), and Indigestion.    She has no past medical history of Addisons disease ([facility]), Adrenal disorder ([facility]), Anemia, Anxiety, Arrhythmia, Arthritis, Asthma, Blood transfusion without reported diagnosis, Cancer ([facility]), Cataract, CHF (congestive heart failure) ([facility]), Clotting disorder ([facility]), COPD (chronic obstructive pulmonary disease) ([facility]), Cushings syndrome ([facility]), Depression, Diabetes ([facility]), Diabetic neuropathy ([facility]), GERD (gastroesophageal reflux disease), Glaucoma, Goiter, Head ache, Heart attack ([facility]), Heart murmur, HIV (human immunodeficiency virus infection) ([facility]), Hyperlipidemia, Hypertension, IBD (inflammatory bowel disease), Kidney disease, Meningitis, Migraine, Muscle disorder, Osteoporosis, Parathyroid disorder ([facility]), Pituitary disease ([facility]), Pulmonary emphysema ([facility]), Seizure ([facility]), Sickle cell disease ([facility]), Stroke ([facility]), Substance abuse ([facility]), Thyroid disease, Tuberculosis, or Urinary tract infection.  MEDS:   Current Outpatient Medications:     Fluticasone Propionate (FLONASE NA), Administer  into affected  "nostril(S)., Disp: , Rfl:   ALLERGIES:   Allergies   Allergen Reactions    Azithromycin Rash     Z-PACK     SURGHX:   Past Surgical History:   Procedure Laterality Date    OTHER  2004?    breast implants    OTHER      foregin body removal on toe    UT BREAST AUGMENTATION WITH IMPLANT Bilateral     TONSILLECTOMY       SOCHX:  reports that she has never smoked. She has never used smokeless tobacco. She reports that she does not drink alcohol and does not use drugs.  FH: Family history was reviewed, no pertinent findings to report    Review of Systems   Constitutional:  Negative for fever.   HENT:  Negative for congestion.    Eyes:  Positive for blurred vision (The patient reports intermittent blurred vision of the left eye.), pain, discharge (The patient reports intermittent discharge/drainage.) and redness (The patient reports redness to the left eye, which has slightly improved). Negative for double vision, photophobia and itching.   Respiratory:  Negative for cough.    Gastrointestinal:  Negative for nausea and vomiting.   Musculoskeletal:  Negative for myalgias.   Neurological:  Negative for headaches.            Objective     /68 (BP Location: Right arm, Patient Position: Sitting, BP Cuff Size: Small adult)   Pulse 85   Temp 36.2 °C (97.1 °F) (Temporal)   Resp 20   Ht 1.676 m (5' 6\")   Wt 63 kg (139 lb)   LMP 03/09/2015   SpO2 100%   BMI 22.44 kg/m²      Physical Exam  Constitutional:       General: She is not in acute distress.     Appearance: Normal appearance. She is well-developed. She is not ill-appearing.   HENT:      Head: Normocephalic and atraumatic.      Right Ear: External ear normal.      Left Ear: External ear normal.   Eyes:      General: Lids are normal. Lids are everted, no foreign bodies appreciated. Vision grossly intact.         Left eye: No foreign body or discharge.      Extraocular Movements: Extraocular movements intact.      Conjunctiva/sclera:      Left eye: Left conjunctiva " is injected.      Pupils: Pupils are equal, round, and reactive to light.      Comments:   The patient's left conjunctiva is slightly injected without discharge/drainage.  No foreign body was visualized.   Cardiovascular:      Rate and Rhythm: Normal rate.   Pulmonary:      Effort: Pulmonary effort is normal.   Musculoskeletal:         General: Normal range of motion.      Cervical back: Normal range of motion and neck supple.   Skin:     General: Skin is warm and dry.   Neurological:      Mental Status: She is alert and oriented to person, place, and time.                           Assessment & Plan         1. Conjunctivitis of left eye, unspecified conjunctivitis type  - ofloxacin (OCUFLOX) 0.3 % Solution; Administer 1 Drop into the left eye 4 times a day for 7 days.  Dispense: 10 mL; Refill: 0    The patient's presenting symptoms and physical examination are consistent with conjunctivitis of the left eye.  On physical exam, the patient's left conjunctiva was slightly injected without discharge/drainage.  No foreign body was visualized.  The patient's pupils were equal round and reactive to light.  The patient's eyelids were normal without edema or erythema.  The patient's periorbital region was also normal without edema or erythema.  Informed the patient of the various types of conjunctivitis, including bacterial, viral, and allergic.  Given that the patient's symptoms have slightly, I have low clinical suspicion for bacterial conjunctivitis.  However, the the patient does wear contacts daily -which may increase her risk of bacterial conjunctivitis.  Therefore, will treat the patient for possible bacterial conjunctivitis with ofloxacin antibiotic eyedrops.  The patient is scheduled to follow-up with her optometrist next week.  Instructed the patient to follow-up with her optometrist as scheduled.  Advised patient to monitor worsening signs and symptoms.  Instruct the patient not to wear contacts while undergoing  treatment.  Recommend OTC medications and supportive care for symptomatic management.  Recommend patient follow-up with PCP as needed.  Discussed treatment options with patient, and she verbalized understanding.    Differential diagnoses, supportive care, and indications for immediate follow-up discussed with patient.   Instructed to return to clinic or nearest emergency department for any change in condition, further concerns, or worsening of symptoms.    OTC Tylenol or Motrin for fever/discomfort.  Avoid touching eyes  Hand Hygiene   Avoid use of contacts while undergoing treatment  Follow-up with PCP  Return to clinic or go to the ED if symptoms worsen or fail to improve, or if patient should develop worsening/increasing/persistent eye redness, eye drainage, eye pain, eye itchiness, vision changes, periorbital redness or swelling, headache, fever/chills, and/or any concerning symptoms.    Discussed plan with the patient, and she agrees to the above.     I personally reviewed prior external notes and test results pertinent to today's visit.  I have independently reviewed and interpreted all diagnostics ordered during this urgent care visit.     Please note that this dictation was created using voice recognition software. I have made every reasonable attempt to correct obvious errors, but I expect that there may be errors of grammar and possibly content that I did not discover before finalizing the note.     This note was electronically signed by Klaudia Castano PA-C

## 2022-09-14 ENCOUNTER — APPOINTMENT (OUTPATIENT)
Dept: RADIOLOGY | Facility: MEDICAL CENTER | Age: 62
End: 2022-09-14
Attending: FAMILY MEDICINE
Payer: COMMERCIAL

## 2022-09-30 ENCOUNTER — TELEPHONE (OUTPATIENT)
Dept: MEDICAL GROUP | Age: 62
End: 2022-09-30
Payer: COMMERCIAL

## 2022-10-04 ENCOUNTER — OFFICE VISIT (OUTPATIENT)
Dept: MEDICAL GROUP | Age: 62
End: 2022-10-04
Payer: COMMERCIAL

## 2022-10-04 VITALS
DIASTOLIC BLOOD PRESSURE: 78 MMHG | RESPIRATION RATE: 16 BRPM | OXYGEN SATURATION: 96 % | HEIGHT: 66 IN | TEMPERATURE: 98.6 F | WEIGHT: 142 LBS | SYSTOLIC BLOOD PRESSURE: 120 MMHG | BODY MASS INDEX: 22.82 KG/M2 | HEART RATE: 60 BPM

## 2022-10-04 DIAGNOSIS — R09.81 SINUS CONGESTION: ICD-10-CM

## 2022-10-04 DIAGNOSIS — Z00.00 BLOOD TESTS FOR ROUTINE GENERAL PHYSICAL EXAMINATION: ICD-10-CM

## 2022-10-04 DIAGNOSIS — E78.2 MIXED HYPERLIPIDEMIA: ICD-10-CM

## 2022-10-04 PROCEDURE — 99214 OFFICE O/P EST MOD 30 MIN: CPT | Performed by: PHYSICIAN ASSISTANT

## 2022-10-04 ASSESSMENT — FIBROSIS 4 INDEX: FIB4 SCORE: 1.18

## 2022-10-04 NOTE — PROGRESS NOTES
"cc: Sinus concerns    Subjective:     HPI    Madhavi Baires is a 62 y.o. female presenting with sinus concerns.  She states that for as long as she can remember she has always had sinus issues.  She finds it difficult to breathe out of her nose.  Feels that it is worse on the left than the right.  She does not have nasal congestion or rhinorrhea.  She does have an appointment with ENT later today.  She is using nasal sprays and over-the-counter antihistamines with minimal improvement.  Denies sinus pressure, fever, ear pain.      Review of systems:  See above.       Current Outpatient Medications:     Fluticasone Propionate (FLONASE NA), Administer  into affected nostril(S)., Disp: , Rfl:     Allergies, past medical history, past surgical history, family history, social history reviewed and updated    Objective:     Vitals: /78 (BP Location: Right arm, Patient Position: Sitting, BP Cuff Size: Adult)   Pulse 60   Temp 37 °C (98.6 °F) (Temporal)   Resp 16   Ht 1.676 m (5' 6\")   Wt 64.4 kg (142 lb)   LMP 03/09/2015   SpO2 96%   BMI 22.92 kg/m²   General: Alert, pleasant, NAD  HEENT: Normocephalic. Neck supple.  TMs gray bilaterally.  No sinus pressure tenderness.  Possible deviated septum appreciated.  No thyromegaly or masses palpated. No cervical or supraclavicular lymphadenopathy. No carotid bruits   Heart: Regular rate and rhythm.  S1 and S2 normal.  No murmurs appreciated.  Respiratory: Normal respiratory effort.  Clear to auscultation bilaterally.  Skin: Warm, dry, no rashes.  Extremities: No leg edema.  Radial pulses 2+ symmetric  Psych:  Affect/mood is normal, judgement is good, memory is intact, grooming is appropriate.    Assessment/Plan:     Madhavi was seen today for sinusitis.    Diagnoses and all orders for this visit:    Sinus congestion  She does have possible deviated septum, versus nasal polyps, versus chronic sinus inflammation.  She is following up with ENT later today.  " Advised further treatment with ENT.    Mixed hyperlipidemia  -Due for labs.  Working on lifestyle modifications.  -     Comp Metabolic Panel; Future  -     Lipid Profile; Future    Blood tests for routine general physical examination  -     CBC WITH DIFFERENTIAL; Future  -     Comp Metabolic Panel; Future  -     Lipid Profile; Future  -     TSH WITH REFLEX TO FT4; Future      Return in about 4 months (around 2/4/2023) for Annual PX, Lab Review.

## 2022-10-07 ENCOUNTER — APPOINTMENT (OUTPATIENT)
Dept: MEDICAL GROUP | Facility: MEDICAL CENTER | Age: 62
End: 2022-10-07
Payer: COMMERCIAL

## 2022-10-26 NOTE — PROGRESS NOTES
CC: New patient: Chronic back pain, chronic neck pain, anxiety, umbilical hernia    HPI:  Madhavi presents today to establish new PCP.    Patient has been active and independent.  Still work.  Has the following chronic medical issues:    Chronic midline back pain, unspecified back location  Patient has history of degenerative disc disease of the thoracic and lumbar spine(mild).  Recently has been feeling pain all over her back mostly in the mid back, always localized.  She has been having normal bowel and urinary control.  Denies any leg weakness or pain.  Takes over-the-counter pain medication as needed    Neck pain  Patient has chronic neck pain.  The pain is tolerable, nonradiating, affecting the upper and the lower cervical spine.  Last x-ray of the cervical spine 2004 showed degenerative disc issues at C4-C5, and C5-C6.  Lately her pain gets little bit worse, however it has been localized.  She denies any upper or lower extremity pain or weakness.    Umbilical hernia without obstruction and without gangrene  Patient has been having mild umbilical hernia.  Bulge out once in a while.  Denies abdominal pain, nausea, vomiting.  Denies constipation or diarrhea.      Anxiety  Patient has been having intermittent anxiety.  Was prescribed medication in the past but she has not been taking it and always he it resolves spontaneously without taking any medication.  Currently she is currently asymptomatic.     Due for flu shot, does not want to take it this year.  Unable to do the colonoscopy, patient develops hypoglycemia if she fast so she has not been able to do the colonoscopy.  However she has been doing the fit test has been normal.  Doing a mammogram and Pap smear regularly, always examined and ordered by her gynecologist.  Patient body feeds her(Tall and thin female) makes her prone to osteopenia/osteoporosis, so she is due for bone DEXA scan    Patient Active Problem List    Diagnosis Date Noted   • Dysfunction  [facility] and Delivery Triage Note        CHIEF COMPLAINT:  dysuria    HISTORY OF PRESENT ILLNESS:      The patient is a 27 y.o. female 19w6d. OB History          4    Para   2    Term   2       0    AB   1    Living   2         SAB   0    IAB   0    Ectopic   1    Molar   0    Multiple   0    Live Births   2              Patient presents complaining of dysuria over past 2 weeks. She just finished an rx for amoxicillin. She denies fever or back pain    She denies contractions. She reports Normal fetal movement. She denies vaginal bleeding. She denies leakage of amniotic fluid     Estimated Due Date:  Estimated Date of Delivery: 3/15/23    PAST MEDICAL HISTORY:   Past Medical History:   Diagnosis Date    Anxiety     Complication of anesthesia     blurry vision for a few days after general anesthesia for ectopic pregnancy    Hip pain     History of HPV infection     Migraine        PAST  SURGICAL HISTORY:   Past Surgical History:   Procedure Laterality Date    ECTOPIC PREGNANCY SURGERY  2020       SOCIAL HISTORY:     reports that she has never smoked. She has never used smokeless tobacco. She reports that she does not drink alcohol and does not use drugs. MEDICATIONS:    Prior to Admission medications    Medication Sig Start Date End Date Taking? Authorizing Provider   Probiotic Product (PROBIOTIC ACIDOPHILUS BEADS PO) Take by mouth daily   Yes Historical Provider, MD   nitrofurantoin, macrocrystal-monohydrate, (MACROBID) 100 MG capsule Take 1 capsule by mouth 2 times daily for 14 days 10/25/22 11/8/22 Yes Maria Ines Akers MD   Prenatal Vit-Fe Fumarate-FA (PRENATAL 1+1 PO) Take by mouth    Historical Provider, MD   Ascorbic Acid (VITAMIN C) 250 MG tablet Take 250 mg by mouth daily    Historical Provider, MD        PRENATAL CARE:    Complicated by: limited lay midwife    REVIEW OF SYSTEMS:     A comprehensive review of systems was negative.     PHYSICAL EXAM:    Vital Signs: Blood pressure 124/74, pulse 100, temperature 98.1 °F (36.7 °C), temperature source Oral, resp. rate 18, height 5' 6\" (1.676 m), weight 150 lb (68 kg), last menstrual period 06/08/2022, SpO2 98 %, unknown if currently breastfeeding. CONSTITUTIONAL:  awake, alert, cooperative, no apparent distress, and appears stated age  LUNGS:  No increased work of breathing, good air exchange, clear to auscultation bilaterally, no crackles or wheezing  CARDIOVASCULAR:  Normal apical impulse, regular rate and rhythm, normal S1 and S2, no S3 or S4, and no murmur noted  ABDOMEN:  No scars, normal bowel sounds, soft, non-distended, non-tender, no masses palpated, no hepatosplenomegally  SKIN:  normal skin color, texture, turgor    Fetal heart rate:  Baseline Heart Rate 140,     Membranes:  Intact    General Labs:      U/A:    Lab Results   Component Value Date/Time    COLORU Yellow 10/25/2022 10:50 PM    PROTEINU Negative 10/25/2022 10:50 PM    PHUR 6.5 10/25/2022 10:50 PM    WBCUA  10/25/2022 10:50 PM    RBCUA see below 10/25/2022 10:50 PM    BACTERIA 2+ 06/04/2021 08:40 PM    CLARITYU SL CLOUDY 10/25/2022 10:50 PM    SPECGRAV 1.015 10/25/2022 10:50 PM    LEUKOCYTESUR LARGE 10/25/2022 10:50 PM    UROBILINOGEN 1.0 10/25/2022 10:50 PM    BILIRUBINUR Negative 10/25/2022 10:50 PM    BLOODU SMALL 10/25/2022 10:50 PM    GLUCOSEU Negative 10/25/2022 10:50 PM       ASSESSMENT: uti    19w6d.   Patient Active Problem List   Diagnosis    Migraine without aura and without status migrainosus, not intractable    Abdominal pain during pregnancy in second trimester          PLAN:  Disposition:  Patient discharged home and instructed on symptoms of labor, rupture of membranes, vaginal bleeding, fetal movement, and fever  Medications: [unfilled]   F/U Instructions: as needed    Silvestre Rocha MD  10/25/2022 of left eustachian tube 06/11/2018   • Allergic rhinitis 03/05/2013       Current Outpatient Medications   Medication Sig Dispense Refill   • fluticasone (FLONASE) 50 MCG/ACT nasal spray Spray 2 Sprays in nose every day. 16 g 11   • loratadine (CLARITIN) 10 MG Tab Take 10 mg by mouth every day.     • hydrOXYzine HCl (ATARAX) 25 MG Tab Take 1 Tab by mouth 3 times a day as needed for Anxiety. (Patient not taking: Reported on 11/7/2019) 30 Tab 0   • sucralfate (CARAFATE) 1 GM Tab TK 1 T PO MIXED WITH 10 MLS OF WATER TID PRF THROAT DISCOMFORT  0   • ofloxacin otic sol (FLOXIN OTIC) 0.3 % Solution INSTILL 5 DROPS INTO THE LEFT EAR ONCE DAILY FOR 5 DAYS 5 mL 0     No current facility-administered medications for this visit.          Allergies as of 11/07/2019 - Reviewed 11/07/2019   Allergen Reaction Noted   • Zithromax [azithromycin dihydrate] Rash 03/03/2012        Social History     Socioeconomic History   • Marital status:      Spouse name: Not on file   • Number of children: Not on file   • Years of education: Not on file   • Highest education level: Not on file   Occupational History   • Not on file   Social Needs   • Financial resource strain: Not on file   • Food insecurity:     Worry: Not on file     Inability: Not on file   • Transportation needs:     Medical: Not on file     Non-medical: Not on file   Tobacco Use   • Smoking status: Never Smoker   • Smokeless tobacco: Never Used   Substance and Sexual Activity   • Alcohol use: No     Alcohol/week: 0.0 oz   • Drug use: No   • Sexual activity: Yes     Birth control/protection: Post-Menopausal     Comment:  vasectomy   Lifestyle   • Physical activity:     Days per week: Not on file     Minutes per session: Not on file   • Stress: Not on file   Relationships   • Social connections:     Talks on phone: Not on file     Gets together: Not on file     Attends Orthodoxy service: Not on file     Active member of club or organization: Not on file     Attends  "meetings of clubs or organizations: Not on file     Relationship status: Not on file   • Intimate partner violence:     Fear of current or ex partner: Not on file     Emotionally abused: Not on file     Physically abused: Not on file     Forced sexual activity: Not on file   Other Topics Concern   • Not on file   Social History Narrative   • Not on file       Family History   Problem Relation Age of Onset   • Diabetes Mother    • Heart Disease Mother         cad   • Cancer Father 44        stomach   • Cancer Maternal Aunt         colon   • Diabetes Maternal Grandmother    • Cancer Maternal Grandmother         breast   • Breast Cancer Maternal Grandmother    • Hypertension Neg Hx    • Hyperlipidemia Neg Hx    • Stroke Neg Hx        Past Surgical History:   Procedure Laterality Date   • OTHER  2004?    breast implants   • PB ENLARGE BREAST WITH IMPLANT     • TONSILLECTOMY         ROS:  Denies any Headache, Blurred Vision, Confusion Chest pain,  Shortness of breath,  Abdominal pain, Changes of bowel or bladder, Lower ext edema, Fevers, Nights sweats, Weight Changes, Focal weakness or numbness.  All other systems are negative.    /72 (BP Location: Left arm, Patient Position: Sitting, BP Cuff Size: Adult)   Pulse 76   Temp 37.4 °C (99.4 °F) (Temporal)   Resp 14   Ht 1.676 m (5' 6\")   Wt 63 kg (139 lb)   LMP 03/09/2015   SpO2 97%   BMI 22.44 kg/m²     Physical Exam:  Gen:         Alert and oriented, No apparent distress.  HEENT:   Perrla, TM clear,  Oralpharynx no erythema or exudates.  Neck:       No Jugular venous distension, Lymphadenopathy, Thyromegaly, Bruits.  Lungs:     Clear to auscultation bilaterally  CV:          Regular rate and rhythm. No murmurs, rubs or gallops.  Abd:         Soft non tender, non distended. Normal active bowel sounds. No                                        Hepatosplenomegaly, No pulsatile masses.  Ext:          No clubbing, cyanosis, edema.      Assessment and Plan.   59 " y.o. female     1. Chronic midline back pain, unspecified back location  Patient has history of degenerative disc disease of the thoracic and lumbar spine(mild).  Recently has been feeling pain all over her back mostly in the mid back, always localized.  Takes over-the-counter pain medication as needed.    - DX-LUMBAR SPINE-2 OR 3 VIEWS; Future  - DX-THORACIC SPINE-2 VIEWS; Future    2. Neck pain  Chronic.  Last x-ray of the cervical spine 2004 showed degenerative disc issues at C4-C5, and C5-C6.  Pain gets little bit worse, localized.  No arm pain or numbness.  Takes over-the-counter pain medication as needed.    - DX-CERVICAL SPINE-2 OR 3 VIEWS; Future    3. Asymptomatic postmenopausal state  Due for bone density scan.    - DS-BONE DENSITY STUDY (DEXA); Future    4. Anxiety  Intermittent anxiety.  Has been stable and asymptomatic for a while.  Currently not on medication    5. Healthcare maintenance  Due for flu shot, does not want to take it this year.  Unable to do the colonoscopy, patient develops hypoglycemia if she fast so she has not been able to do the colonoscopy.  However she has been doing the fit test has been normal.  Doing a mammogram and Pap smear regularly, always examined and ordered by her gynecologist.    6. Umbilical hernia without obstruction and without gangrene  Mild.  Asymptomatic.  However has been having reducible umbilical hernia.  Continue monitor.

## 2022-11-16 ENCOUNTER — APPOINTMENT (OUTPATIENT)
Dept: RADIOLOGY | Facility: MEDICAL CENTER | Age: 62
End: 2022-11-16
Attending: FAMILY MEDICINE
Payer: COMMERCIAL

## 2022-11-17 ENCOUNTER — APPOINTMENT (OUTPATIENT)
Dept: RADIOLOGY | Facility: MEDICAL CENTER | Age: 62
End: 2022-11-17
Attending: FAMILY MEDICINE
Payer: COMMERCIAL

## 2023-01-06 ENCOUNTER — TELEMEDICINE (OUTPATIENT)
Dept: TELEHEALTH | Facility: TELEMEDICINE | Age: 63
End: 2023-01-06
Payer: COMMERCIAL

## 2023-01-06 VITALS — BODY MASS INDEX: 22.34 KG/M2 | HEIGHT: 66 IN | WEIGHT: 139 LBS

## 2023-01-06 DIAGNOSIS — B96.89 ACUTE BACTERIAL SINUSITIS: ICD-10-CM

## 2023-01-06 DIAGNOSIS — J01.90 ACUTE BACTERIAL SINUSITIS: ICD-10-CM

## 2023-01-06 PROCEDURE — 99213 OFFICE O/P EST LOW 20 MIN: CPT | Mod: 95 | Performed by: PHYSICIAN ASSISTANT

## 2023-01-06 RX ORDER — AMOXICILLIN 500 MG/1
500 CAPSULE ORAL 2 TIMES DAILY
Qty: 14 CAPSULE | Refills: 0 | Status: SHIPPED | OUTPATIENT
Start: 2023-01-06 | End: 2023-01-13

## 2023-01-06 ASSESSMENT — FIBROSIS 4 INDEX: FIB4 SCORE: 1.18

## 2023-01-06 NOTE — PROGRESS NOTES
"Virtual Visit: Established Patient   This visit was conducted via Zoom using secure and encrypted videoconferencing technology.   The patient was in their home in the [facility].    The patient's identity was confirmed and verbal consent was obtained for this virtual visit.    Subjective:   CC:   Chief Complaint   Patient presents with    Sinusitis     Nasal congestion, nasal drip, headache     Madhavi Baires is a 62 y.o. female presenting for evaluation and management of:    She was seen today via telemed visit for sinus congestion has been ongoing over the last 4 days.  Associated sore throat, maxillary/frontal sinus pressure, maxillary dental pain.  She has been using Flonase and nasal lavage for symptoms without longstanding relief.  Does have a history of recurrent bacterial sinusitis that is been well managed with amoxicillin.  Denies fever/chills/sweats, chest pain, shortness of breath, nausea/vomiting, abdominal pain, diarrhea.      ROS   See HPI    Current medicines (including changes today)  Current Outpatient Medications   Medication Sig Dispense Refill    Fluticasone Propionate (FLONASE NA) Administer  into affected nostril(S).       No current facility-administered medications for this visit.       Patient Active Problem List    Diagnosis Date Noted    Dysfunction of left eustachian tube 06/11/2018    Allergic rhinitis 03/05/2013        Objective:   Ht 1.676 m (5' 6\")   Wt 63 kg (139 lb)   LMP 03/09/2015   BMI 22.44 kg/m²     Physical Exam:  Constitutional: Alert, no distress, well-groomed.  Skin: No rashes in visible areas.  Eye: Round. Conjunctiva clear, lids normal. No icterus.   ENMT: Lips pink without lesions, good dentition, moist mucous membranes. Phonation normal.  Neck: No masses, no thyromegaly. Moves freely without pain.  Respiratory: Unlabored respiratory effort, no cough or audible wheeze  Psych: Alert and oriented x3, normal affect and mood.     Assessment and Plan: "   The following treatment plan was discussed:     Encounter Diagnoses   Name Primary?    Acute bacterial sinusitis      Plan:  - Amoxicillin prescription for acute bacterial sinusitis.  Continue nasal lavage and Flonase for additional symptom support.  Continue to monitor symptoms and return to urgent care or follow-up with primary care provider if symptoms remain ongoing.  Follow-up in the emergency department if symptoms become severe, ER precautions discussed in office today.    Follow-up: No follow-ups on file.

## 2023-01-20 ENCOUNTER — HOSPITAL ENCOUNTER (OUTPATIENT)
Dept: RADIOLOGY | Facility: MEDICAL CENTER | Age: 63
End: 2023-01-20
Attending: FAMILY MEDICINE
Payer: COMMERCIAL

## 2023-01-20 ENCOUNTER — HOSPITAL ENCOUNTER (OUTPATIENT)
Dept: RADIOLOGY | Facility: MEDICAL CENTER | Age: 63
End: 2023-01-20
Attending: PHYSICIAN ASSISTANT
Payer: COMMERCIAL

## 2023-01-20 DIAGNOSIS — R92.8 ABNORMAL MAMMOGRAM: ICD-10-CM

## 2023-01-20 PROCEDURE — 76642 ULTRASOUND BREAST LIMITED: CPT | Mod: RT

## 2023-01-20 PROCEDURE — G0279 TOMOSYNTHESIS, MAMMO: HCPCS

## 2023-03-09 NOTE — TELEPHONE ENCOUNTER
1. Caller Name: Madhavi Hoyos                          Call Back Number: [phone] (home) [phone] (work)      How would the patient prefer to be contacted with a response: Timeh[employer] message    2. SPECIFIC Action To Be Taken: Orders pending, please sign.    3. Diagnosis/Clinical Reason for Request: Fit Kit    4. Specialty & Provider Name/Lab/Imaging Location: will mail kit out to pt after order placed     5. Is appointment scheduled for requested order/referral: no    Patient was not informed they will receive a return phone call from the office ONLY if there are any questions before processing their request. Advised to call back if they haven't received a call from the referral department in 5 days.  
Clear

## 2023-04-04 ENCOUNTER — APPOINTMENT (OUTPATIENT)
Dept: MEDICAL GROUP | Age: 63
End: 2023-04-04
Payer: COMMERCIAL

## 2023-04-10 ENCOUNTER — OFFICE VISIT (OUTPATIENT)
Dept: URGENT CARE | Facility: PHYSICIAN GROUP | Age: 63
End: 2023-04-10
Payer: COMMERCIAL

## 2023-04-10 VITALS
HEART RATE: 68 BPM | OXYGEN SATURATION: 96 % | BODY MASS INDEX: 23.98 KG/M2 | TEMPERATURE: 98.9 F | HEIGHT: 66 IN | DIASTOLIC BLOOD PRESSURE: 62 MMHG | WEIGHT: 149.2 LBS | SYSTOLIC BLOOD PRESSURE: 114 MMHG | RESPIRATION RATE: 16 BRPM

## 2023-04-10 DIAGNOSIS — H60.502 ACUTE OTITIS EXTERNA OF LEFT EAR, UNSPECIFIED TYPE: ICD-10-CM

## 2023-04-10 DIAGNOSIS — H10.9 CONJUNCTIVITIS OF LEFT EYE, UNSPECIFIED CONJUNCTIVITIS TYPE: ICD-10-CM

## 2023-04-10 PROCEDURE — 99213 OFFICE O/P EST LOW 20 MIN: CPT | Performed by: STUDENT IN AN ORGANIZED HEALTH CARE EDUCATION/TRAINING PROGRAM

## 2023-04-10 RX ORDER — OFLOXACIN 3 MG/ML
5 SOLUTION AURICULAR (OTIC) DAILY
Qty: 7 ML | Refills: 0 | Status: SHIPPED | OUTPATIENT
Start: 2023-04-10 | End: 2023-04-17

## 2023-04-10 ASSESSMENT — FIBROSIS 4 INDEX: FIB4 SCORE: 1.18

## 2023-04-10 NOTE — PROGRESS NOTES
"Subjective:   ABBIE MACK is a 62 y.o. female who presents for Otalgia (Left ear,scar tissue in canal, pain, x3 days)      HPI:  Pleasant 62-year-old female presents urgent care for 3 days of muffled hearing and ear pain on the left side.  She does report a history of swimmer's ear.  She states that she does have ofloxacin drops at home but they are .  Denies fever, chills, nausea, vomiting, blurred vision, double vision, tinnitus, for hearing loss, ear discharge, sinus pain, sinus pressure, sore throat, dizziness, vomiting, or headache.    Medications:    FLONASE NA  ofloxacin otic sol Soln    Allergies: Azithromycin    Problem List: ABBIE MACK does not have any pertinent problems on file.    Surgical History:  Past Surgical History:   Procedure Laterality Date    OTHER  ?    breast implants    OTHER      foregin body removal on toe    OR BREAST AUGMENTATION WITH IMPLANT Bilateral     TONSILLECTOMY         Past Social Hx: ABBIE MACK  reports that she has never smoked. She has never used smokeless tobacco. She reports that she does not drink alcohol and does not use drugs.     Past Family Hx:  ABBIE MACK family history includes Atrial fibrillation in her son; Breast Cancer in her maternal grandmother; Cancer in her maternal aunt and maternal grandmother; Cancer (age of onset: 44) in her father; Clotting Disorder in her brother; Diabetes in her maternal grandmother and mother; Heart Disease in her mother; Hypertension in her son; No Known Problems in her daughter; Other in her son.     Problem list, medications, and allergies reviewed by myself today in Epic.     Objective:     /62 (BP Location: Right arm, Patient Position: Sitting, BP Cuff Size: Adult)   Pulse 68   Temp 37.2 °C (98.9 °F) (Temporal)   Resp 16   Ht 1.676 m (5' 6\")   Wt 67.7 kg (149 lb 3.2 oz)   LMP 2015   SpO2 96%   BMI 24.08 kg/m²     Physical Exam  Vitals " reviewed.   Constitutional:       General: She is not in acute distress.     Appearance: Normal appearance.   HENT:      Head: Normocephalic.      Right Ear: Tympanic membrane, ear canal and external ear normal.      Left Ear: Tympanic membrane, ear canal and external ear normal.      Ears:      Comments: Mild swelling to the ear canal.  Erythema also present to the ear canal.  No significant discharge.  Patient does have moderate amount of cerumen present to the ear canal.  TM was partially visualized which showed injection, erythema, or perforation.     Nose: Nose normal.      Mouth/Throat:      Mouth: Mucous membranes are moist.   Eyes:      Conjunctiva/sclera: Conjunctivae normal.      Pupils: Pupils are equal, round, and reactive to light.   Cardiovascular:      Rate and Rhythm: Normal rate.      Pulses: Normal pulses.   Pulmonary:      Effort: Pulmonary effort is normal.   Musculoskeletal:      Cervical back: Normal range of motion.   Lymphadenopathy:      Cervical: No cervical adenopathy.   Neurological:      Mental Status: She is alert.       Assessment/Plan:     Diagnosis and associated orders:     1. Acute otitis externa of left ear, unspecified type  ofloxacin otic sol (FLOXIN OTIC) 0.3 % Solution         Comments/MDM:     Patient's presentation physical exam findings are consistent with acute otitis externa of the left ear.  She does have moderate amount of cerumen present to the ear canal.  Patient was offered ear lavage in clinic but she deferred this.  She states that she can get it out at home easily.  Advised to remove this wax and then start her ofloxacin.  She is agreeable to this.  No otitis media bilaterally.  No abnormal findings to the right ear.  Vitals all within normal limits.  ED/return precautions were given.  No signs of malignant otitis externa.         Differential diagnosis, natural history, supportive care, and indications for immediate follow-up discussed.    Advised the patient to  follow-up with the primary care physician for recheck, reevaluation, and consideration of further management.    Please note that this dictation was created using voice recognition software. I have made a reasonable attempt to correct obvious errors, but I expect that there are errors of grammar and possibly content that I did not discover before finalizing the note.    Electronically signed by Elmo Lawrence PA-C.

## 2023-05-11 ENCOUNTER — TELEMEDICINE (OUTPATIENT)
Dept: MEDICAL GROUP | Age: 63
End: 2023-05-11
Payer: COMMERCIAL

## 2023-05-11 VITALS — RESPIRATION RATE: 16 BRPM | BODY MASS INDEX: 23.63 KG/M2 | WEIGHT: 147 LBS | HEIGHT: 66 IN

## 2023-05-11 DIAGNOSIS — R19.7 INTERMITTENT DIARRHEA: ICD-10-CM

## 2023-05-11 PROCEDURE — 99214 OFFICE O/P EST MOD 30 MIN: CPT | Mod: 95 | Performed by: PHYSICIAN ASSISTANT

## 2023-05-11 ASSESSMENT — PATIENT HEALTH QUESTIONNAIRE - PHQ9: CLINICAL INTERPRETATION OF PHQ2 SCORE: 0

## 2023-05-11 ASSESSMENT — FIBROSIS 4 INDEX: FIB4 SCORE: 1.2

## 2023-05-11 NOTE — PROGRESS NOTES
"Virtual Visit: Established Patient   This visit was conducted via Zoom using secure and encrypted videoconferencing technology.   The patient was in their home in the [facility] [place].    The patient's identity was confirmed and verbal consent was obtained for this virtual visit.     Subjective:   CC:   Chief Complaint   Patient presents with    GI Problem       Madhavi Baires is a 63 y.o. female presenting with concerns of intermittent diarrhea that started approximately 6 weeks ago.  She states that it onsets suddenly, will have abdominal cramping, loose stools for about 15 minutes, then completely resolves.  She will have an upset stomach for 2 to 3 days after, just feels unsettled, but BMs will be solid.  She does take Pepto, which provides good relief.  She has not noted any specific trigger, she is trying to cut back on gluten and dairy.  She does note that the last episode she had did have fast food the day before.  She has been undergoing a lot of stress lately as well.  She can go 1 to 2 weeks without having any symptoms.  Denies nausea, vomiting, melena, hematochezia, diaphoresis, fevers.  She did have Cologuard completed last year, this was negative    ROS   See above    Current medicines (including changes today)  Current Outpatient Medications   Medication Sig Dispense Refill    Fluticasone Propionate (FLONASE NA) Administer  into affected nostril(S).       No current facility-administered medications for this visit.       Patient Active Problem List    Diagnosis Date Noted    Dysfunction of left eustachian tube 06/11/2018    Allergic rhinitis 03/05/2013        Objective:   Resp 16   Ht 1.676 m (5' 6\")   Wt 66.7 kg (147 lb)   LMP 03/09/2015   BMI 23.73 kg/m²     Physical Exam:  Constitutional: Alert, no distress, well-groomed.  Skin: No rashes in visible areas.  Eye: Round. Conjunctiva clear, lids normal. No icterus.   ENMT: Lips pink without lesions, good dentition, moist mucous " membranes. Phonation normal.  Neck: No masses, no thyromegaly. Moves freely without pain.  Respiratory: Unlabored respiratory effort, no cough or audible wheeze  Psych: Alert and oriented x3, normal affect and mood.     Assessment and Plan:   The following treatment plan was discussed:     1. Intermittent diarrhea  -She has been having intermittent bouts of diarrhea, only 1 episode per day, does have residual GI upset for 2 to 3 days following.  Can go 1 to 2 weeks without having any symptoms.  Do not suspect that this is food related, however will obtain celiac's panel, also stool studies.  She does have CBC, TSH and CMP ordered, advised to have this completed.  As it is so intermittent, would not recommend Imodium daily, however Pepto does provide good benefit afterwards she can continue with this for 2 to 3 days.  Advised to keep a food log.  If symptoms persist, cannot find a trigger, labs are unremarkable advised to follow-up with GI for further evaluation.  - OCCULT BLOOD FECES IMMUNOASSAY; Future  - CRYPTO/GIARDIA RAPID ASSAY; Future  - Referral to Gastroenterology  - CELIAC DISEASE AB PANEL; Future      Follow-up: Return in about 6 weeks (around 6/22/2023) for Annual PX, Lab Review.

## 2023-05-17 ENCOUNTER — HOSPITAL ENCOUNTER (OUTPATIENT)
Dept: LAB | Facility: MEDICAL CENTER | Age: 63
End: 2023-05-17
Attending: PHYSICIAN ASSISTANT
Payer: COMMERCIAL

## 2023-05-17 ENCOUNTER — HOSPITAL ENCOUNTER (OUTPATIENT)
Facility: MEDICAL CENTER | Age: 63
End: 2023-05-17
Attending: PHYSICIAN ASSISTANT
Payer: COMMERCIAL

## 2023-05-17 DIAGNOSIS — E78.2 MIXED HYPERLIPIDEMIA: ICD-10-CM

## 2023-05-17 DIAGNOSIS — Z00.00 BLOOD TESTS FOR ROUTINE GENERAL PHYSICAL EXAMINATION: ICD-10-CM

## 2023-05-17 DIAGNOSIS — R19.7 INTERMITTENT DIARRHEA: ICD-10-CM

## 2023-05-17 LAB
ALBUMIN SERPL BCP-MCNC: 4.2 G/DL (ref 3.2–4.9)
ALBUMIN/GLOB SERPL: 1.2 G/DL
ALP SERPL-CCNC: 87 U/L (ref 30–99)
ALT SERPL-CCNC: 16 U/L (ref 2–50)
ANION GAP SERPL CALC-SCNC: 12 MMOL/L (ref 7–16)
AST SERPL-CCNC: 19 U/L (ref 12–45)
BASOPHILS # BLD AUTO: 1 % (ref 0–1.8)
BASOPHILS # BLD: 0.05 K/UL (ref 0–0.12)
BILIRUB SERPL-MCNC: 0.3 MG/DL (ref 0.1–1.5)
BUN SERPL-MCNC: 19 MG/DL (ref 8–22)
CALCIUM ALBUM COR SERPL-MCNC: 8.5 MG/DL (ref 8.5–10.5)
CALCIUM SERPL-MCNC: 8.7 MG/DL (ref 8.5–10.5)
CHLORIDE SERPL-SCNC: 105 MMOL/L (ref 96–112)
CHOLEST SERPL-MCNC: 224 MG/DL (ref 100–199)
CO2 SERPL-SCNC: 24 MMOL/L (ref 20–33)
CREAT SERPL-MCNC: 0.67 MG/DL (ref 0.5–1.4)
EOSINOPHIL # BLD AUTO: 0.12 K/UL (ref 0–0.51)
EOSINOPHIL NFR BLD: 2.4 % (ref 0–6.9)
ERYTHROCYTE [DISTWIDTH] IN BLOOD BY AUTOMATED COUNT: 45.1 FL (ref 35.9–50)
FASTING STATUS PATIENT QL REPORTED: NORMAL
G LAMBLIA+C PARVUM AG STL QL RAPID: NORMAL
GFR SERPLBLD CREATININE-BSD FMLA CKD-EPI: 98 ML/MIN/1.73 M 2
GLOBULIN SER CALC-MCNC: 3.6 G/DL (ref 1.9–3.5)
GLUCOSE SERPL-MCNC: 99 MG/DL (ref 65–99)
HCT VFR BLD AUTO: 48.6 % (ref 37–47)
HDLC SERPL-MCNC: 59 MG/DL
HGB BLD-MCNC: 15.4 G/DL (ref 12–16)
IMM GRANULOCYTES # BLD AUTO: 0.01 K/UL (ref 0–0.11)
IMM GRANULOCYTES NFR BLD AUTO: 0.2 % (ref 0–0.9)
LDLC SERPL CALC-MCNC: 145 MG/DL
LYMPHOCYTES # BLD AUTO: 2.24 K/UL (ref 1–4.8)
LYMPHOCYTES NFR BLD: 44.7 % (ref 22–41)
MCH RBC QN AUTO: 29.2 PG (ref 27–33)
MCHC RBC AUTO-ENTMCNC: 31.7 G/DL (ref 33.6–35)
MCV RBC AUTO: 92 FL (ref 81.4–97.8)
MONOCYTES # BLD AUTO: 0.5 K/UL (ref 0–0.85)
MONOCYTES NFR BLD AUTO: 10 % (ref 0–13.4)
NEUTROPHILS # BLD AUTO: 2.09 K/UL (ref 2–7.15)
NEUTROPHILS NFR BLD: 41.7 % (ref 44–72)
NRBC # BLD AUTO: 0 K/UL
NRBC BLD-RTO: 0 /100 WBC
PLATELET # BLD AUTO: 236 K/UL (ref 164–446)
PMV BLD AUTO: 11.4 FL (ref 9–12.9)
POTASSIUM SERPL-SCNC: 3.7 MMOL/L (ref 3.6–5.5)
PROT SERPL-MCNC: 7.8 G/DL (ref 6–8.2)
RBC # BLD AUTO: 5.28 M/UL (ref 4.2–5.4)
SIGNIFICANT IND 70042: NORMAL
SITE SITE: NORMAL
SODIUM SERPL-SCNC: 141 MMOL/L (ref 135–145)
SOURCE SOURCE: NORMAL
TRIGL SERPL-MCNC: 100 MG/DL (ref 0–149)
TSH SERPL DL<=0.005 MIU/L-ACNC: 2.56 UIU/ML (ref 0.38–5.33)
WBC # BLD AUTO: 5 K/UL (ref 4.8–10.8)

## 2023-05-17 PROCEDURE — 80061 LIPID PANEL: CPT

## 2023-05-17 PROCEDURE — 84443 ASSAY THYROID STIM HORMONE: CPT

## 2023-05-17 PROCEDURE — 87329 GIARDIA AG IA: CPT

## 2023-05-17 PROCEDURE — 87328 CRYPTOSPORIDIUM AG IA: CPT

## 2023-05-17 PROCEDURE — 82274 ASSAY TEST FOR BLOOD FECAL: CPT

## 2023-05-17 PROCEDURE — 80053 COMPREHEN METABOLIC PANEL: CPT

## 2023-05-17 PROCEDURE — 85025 COMPLETE CBC W/AUTO DIFF WBC: CPT

## 2023-05-17 PROCEDURE — 82784 ASSAY IGA/IGD/IGG/IGM EACH: CPT

## 2023-05-17 PROCEDURE — 86364 TISS TRNSGLTMNASE EA IG CLAS: CPT

## 2023-05-18 DIAGNOSIS — R19.7 INTERMITTENT DIARRHEA: ICD-10-CM

## 2023-05-18 LAB — IGA SERPL-MCNC: 920 MG/DL (ref 68–408)

## 2023-05-19 LAB — TTG IGA SER IA-ACNC: <2 U/ML (ref 0–3)

## 2023-05-21 SDOH — ECONOMIC STABILITY: FOOD INSECURITY: WITHIN THE PAST 12 MONTHS, THE FOOD YOU BOUGHT JUST DIDN'T LAST AND YOU DIDN'T HAVE MONEY TO GET MORE.: NEVER TRUE

## 2023-05-21 SDOH — ECONOMIC STABILITY: HOUSING INSECURITY: IN THE LAST 12 MONTHS, HOW MANY PLACES HAVE YOU LIVED?: 1

## 2023-05-21 SDOH — HEALTH STABILITY: PHYSICAL HEALTH: ON AVERAGE, HOW MANY MINUTES DO YOU ENGAGE IN EXERCISE AT THIS LEVEL?: 30 MIN

## 2023-05-21 SDOH — ECONOMIC STABILITY: INCOME INSECURITY: IN THE LAST 12 MONTHS, WAS THERE A TIME WHEN YOU WERE NOT ABLE TO PAY THE MORTGAGE OR RENT ON TIME?: NO

## 2023-05-21 SDOH — ECONOMIC STABILITY: FOOD INSECURITY: WITHIN THE PAST 12 MONTHS, YOU WORRIED THAT YOUR FOOD WOULD RUN OUT BEFORE YOU GOT MONEY TO BUY MORE.: NEVER TRUE

## 2023-05-21 SDOH — HEALTH STABILITY: PHYSICAL HEALTH: ON AVERAGE, HOW MANY DAYS PER WEEK DO YOU ENGAGE IN MODERATE TO STRENUOUS EXERCISE (LIKE A BRISK WALK)?: 3 DAYS

## 2023-05-21 SDOH — ECONOMIC STABILITY: INCOME INSECURITY: HOW HARD IS IT FOR YOU TO PAY FOR THE VERY BASICS LIKE FOOD, HOUSING, MEDICAL CARE, AND HEATING?: SOMEWHAT HARD

## 2023-05-21 SDOH — HEALTH STABILITY: MENTAL HEALTH
STRESS IS WHEN SOMEONE FEELS TENSE, NERVOUS, ANXIOUS, OR CAN'T SLEEP AT NIGHT BECAUSE THEIR MIND IS TROUBLED. HOW STRESSED ARE YOU?: TO SOME EXTENT

## 2023-05-21 ASSESSMENT — LIFESTYLE VARIABLES
HOW MANY STANDARD DRINKS CONTAINING ALCOHOL DO YOU HAVE ON A TYPICAL DAY: PATIENT DOES NOT DRINK
HOW OFTEN DO YOU HAVE SIX OR MORE DRINKS ON ONE OCCASION: NEVER
AUDIT-C TOTAL SCORE: 0
SKIP TO QUESTIONS 9-10: 1
HOW OFTEN DO YOU HAVE A DRINK CONTAINING ALCOHOL: NEVER

## 2023-05-21 ASSESSMENT — SOCIAL DETERMINANTS OF HEALTH (SDOH)
HOW OFTEN DO YOU HAVE A DRINK CONTAINING ALCOHOL: NEVER
HOW OFTEN DO YOU GET TOGETHER WITH FRIENDS OR RELATIVES?: NEVER
IN A TYPICAL WEEK, HOW MANY TIMES DO YOU TALK ON THE PHONE WITH FAMILY, FRIENDS, OR NEIGHBORS?: MORE THAN THREE TIMES A WEEK
WITHIN THE PAST 12 MONTHS, YOU WORRIED THAT YOUR FOOD WOULD RUN OUT BEFORE YOU GOT THE MONEY TO BUY MORE: NEVER TRUE
DO YOU BELONG TO ANY CLUBS OR ORGANIZATIONS SUCH AS CHURCH GROUPS UNIONS, FRATERNAL OR ATHLETIC GROUPS, OR SCHOOL GROUPS?: NO
HOW OFTEN DO YOU HAVE SIX OR MORE DRINKS ON ONE OCCASION: NEVER
HOW OFTEN DO YOU GET TOGETHER WITH FRIENDS OR RELATIVES?: NEVER
HOW OFTEN DO YOU ATTENT MEETINGS OF THE CLUB OR ORGANIZATION YOU BELONG TO?: NEVER
HOW HARD IS IT FOR YOU TO PAY FOR THE VERY BASICS LIKE FOOD, HOUSING, MEDICAL CARE, AND HEATING?: SOMEWHAT HARD
HOW MANY DRINKS CONTAINING ALCOHOL DO YOU HAVE ON A TYPICAL DAY WHEN YOU ARE DRINKING: PATIENT DOES NOT DRINK
HOW OFTEN DO YOU ATTEND CHURCH OR RELIGIOUS SERVICES?: NEVER
DO YOU BELONG TO ANY CLUBS OR ORGANIZATIONS SUCH AS CHURCH GROUPS UNIONS, FRATERNAL OR ATHLETIC GROUPS, OR SCHOOL GROUPS?: NO
HOW OFTEN DO YOU ATTENT MEETINGS OF THE CLUB OR ORGANIZATION YOU BELONG TO?: NEVER
IN A TYPICAL WEEK, HOW MANY TIMES DO YOU TALK ON THE PHONE WITH FAMILY, FRIENDS, OR NEIGHBORS?: MORE THAN THREE TIMES A WEEK
HOW OFTEN DO YOU ATTEND CHURCH OR RELIGIOUS SERVICES?: NEVER

## 2023-05-22 LAB — IMM ASSAY OCC BLD FITOB: NEGATIVE

## 2023-05-24 ENCOUNTER — APPOINTMENT (OUTPATIENT)
Dept: RADIOLOGY | Facility: IMAGING CENTER | Age: 63
End: 2023-05-24
Attending: PHYSICIAN ASSISTANT
Payer: COMMERCIAL

## 2023-05-24 ENCOUNTER — OFFICE VISIT (OUTPATIENT)
Dept: MEDICAL GROUP | Age: 63
End: 2023-05-24
Payer: COMMERCIAL

## 2023-05-24 VITALS
DIASTOLIC BLOOD PRESSURE: 76 MMHG | TEMPERATURE: 97 F | SYSTOLIC BLOOD PRESSURE: 120 MMHG | OXYGEN SATURATION: 96 % | BODY MASS INDEX: 23.46 KG/M2 | HEIGHT: 66 IN | RESPIRATION RATE: 16 BRPM | WEIGHT: 146 LBS | HEART RATE: 94 BPM

## 2023-05-24 DIAGNOSIS — R19.7 INTERMITTENT DIARRHEA: ICD-10-CM

## 2023-05-24 DIAGNOSIS — E78.5 DYSLIPIDEMIA: ICD-10-CM

## 2023-05-24 DIAGNOSIS — M79.672 LEFT FOOT PAIN: ICD-10-CM

## 2023-05-24 DIAGNOSIS — Z00.00 WELL ADULT EXAM: ICD-10-CM

## 2023-05-24 PROCEDURE — 99213 OFFICE O/P EST LOW 20 MIN: CPT | Mod: 25 | Performed by: PHYSICIAN ASSISTANT

## 2023-05-24 PROCEDURE — 3078F DIAST BP <80 MM HG: CPT | Performed by: PHYSICIAN ASSISTANT

## 2023-05-24 PROCEDURE — 73630 X-RAY EXAM OF FOOT: CPT | Mod: TC,LT | Performed by: STUDENT IN AN ORGANIZED HEALTH CARE EDUCATION/TRAINING PROGRAM

## 2023-05-24 PROCEDURE — 99396 PREV VISIT EST AGE 40-64: CPT | Performed by: PHYSICIAN ASSISTANT

## 2023-05-24 PROCEDURE — 3074F SYST BP LT 130 MM HG: CPT | Performed by: PHYSICIAN ASSISTANT

## 2023-05-24 ASSESSMENT — FIBROSIS 4 INDEX: FIB4 SCORE: 1.29

## 2023-05-24 NOTE — PROGRESS NOTES
Subjective:     CC:   Chief Complaint   Patient presents with    Annual Exam       HPI:   Madhavi Baires is a 63 y.o. female who presents for annual exam.    She has also been having intermittent bouts of diarrhea, this started about 2 months ago.  It onsets suddenly, will have abdominal pain, cramping and bloating, loose stools for about 15 minutes and completely resolves.  I have an upset stomach for 2 to 3 days after.  Stool studies are negative, except for celiac's antibody panel, IgA was elevated, but reflex to T-TTG IgA was undetectable.  She has however completely cut out gluten, was already not eating very frequently, has not had any symptoms since.  She has been referred to GI, but has not scheduled appointment yet.    She also has concerns of left foot pain.  Was doing some yard work, may or may not have dropped something on her foot or kicked it.  It is on the lateral portion of the left foot.  Moderately tender to palpation.  When she wakes up in the morning states it is very painful to walk on, but improves throughout the day.  Is slightly swollen, does not note any bruising.  Has been taking ibuprofen with some improvement.    Patient has GYN provider: Yes  Last Pap Smear: 3/2023 exposure to JANIS exposure  H/O Abnormal Pap: No  Last Mammogram: 2023  Last Colorectal Cancer Screenin2021  Last Tdap: 2018  Received HPV series: Aged out    Exercise: Has not been exercising regularly at the past few weeks, plans to get back into the gym.  Diet: Fairly well-rounded.    Patient's last menstrual period was 2015.  She has not utilized hormone replacement therapy.  Denies any menopausal symptoms.  No significant bloating/fluid retention, pelvic pain, or dyspareunia. No abnormal vaginal discharge.   No breast tenderness, mass, nipple discharge or changes in size or contour.     Latest Reference Range & Units 23 06:14 23 06:45 23 10:01   WBC 4.8 - 10.8 K/uL 5.0     RBC  4.20 - 5.40 M/uL 5.28     Hemoglobin 12.0 - 16.0 g/dL 15.4     Hematocrit 37.0 - 47.0 % 48.6 (H)     MCV 81.4 - 97.8 fL 92.0     MCH 27.0 - 33.0 pg 29.2     MCHC 33.6 - 35.0 g/dL 31.7 (L)     RDW 35.9 - 50.0 fL 45.1     Platelet Count 164 - 446 K/uL 236     MPV 9.0 - 12.9 fL 11.4     Neutrophils-Polys 44.00 - 72.00 % 41.70 (L)     Neutrophils (Absolute) 2.00 - 7.15 K/uL 2.09     Lymphocytes 22.00 - 41.00 % 44.70 (H)     Lymphs (Absolute) 1.00 - 4.80 K/uL 2.24     Monocytes 0.00 - 13.40 % 10.00     Monos (Absolute) 0.00 - 0.85 K/uL 0.50     Eosinophils 0.00 - 6.90 % 2.40     Eos (Absolute) 0.00 - 0.51 K/uL 0.12     Basophils 0.00 - 1.80 % 1.00     Baso (Absolute) 0.00 - 0.12 K/uL 0.05     Immature Granulocytes 0.00 - 0.90 % 0.20     Immature Granulocytes (abs) 0.00 - 0.11 K/uL 0.01     Nucleated RBC /100 WBC 0.00     NRBC (Absolute) K/uL 0.00     Sodium 135 - 145 mmol/L 141     Potassium 3.6 - 5.5 mmol/L 3.7     Chloride 96 - 112 mmol/L 105     Co2 20 - 33 mmol/L 24     Anion Gap 7.0 - 16.0  12.0     Glucose 65 - 99 mg/dL 99     Bun 8 - 22 mg/dL 19     Creatinine 0.50 - 1.40 mg/dL 0.67     GFR (CKD-EPI) >60 mL/min/1.73 m 2 98     Calcium 8.5 - 10.5 mg/dL 8.7     Correct Calcium 8.5 - 10.5 mg/dL 8.5     AST(SGOT) 12 - 45 U/L 19     ALT(SGPT) 2 - 50 U/L 16     Alkaline Phosphatase 30 - 99 U/L 87     Total Bilirubin 0.1 - 1.5 mg/dL 0.3     Albumin 3.2 - 4.9 g/dL 4.2     Total Protein 6.0 - 8.2 g/dL 7.8     Globulin 1.9 - 3.5 g/dL 3.6 (H)     A-G Ratio g/dL 1.2     Fasting Status  Fasting     Cholesterol,Tot 100 - 199 mg/dL 224 (H)     Triglycerides 0 - 149 mg/dL 100     HDL >=40 mg/dL 59     LDL <100 mg/dL 145 (H)     Occult Blood, IA   Negative    Immunoglobulin A 68 - 408 mg/dL 920 (H)     t-TG IgA 0 - 3 U/mL <2     TSH 0.380 - 5.330 uIU/mL 2.560     Significant Indicator    NEG   Site    STOOL   Source    STL   (H): Data is abnormally high  (L): Data is abnormally low    OB History    Para Term  AB  Living   3 3 3 0 0 3   SAB IAB Ectopic Molar Multiple Live Births   0 0 0 0 0 3      She  reports that she is not currently sexually active. She reports using the following method of birth control/protection: Post-Menopausal.    She  has a past medical history of Allergic rhinitis (03/05/2013), Allergic rhinitis, Arthritis, Chronic rhinitis (03/05/2013), and Indigestion.    She has no past medical history of Addisons disease ([facility]), Adrenal disorder ([facility]), Anemia, Anxiety, Arrhythmia, Asthma, Blood transfusion without reported diagnosis, Cancer ([facility]), Cataract, CHF (congestive heart failure) ([facility]), Clotting disorder ([facility]), COPD (chronic obstructive pulmonary disease) ([facility]), Cushings syndrome ([facility]), Depression, Diabetes ([facility]), Diabetic neuropathy ([facility]), GERD (gastroesophageal reflux disease), Glaucoma, Goiter, Head ache, Heart attack ([facility]), Heart murmur, HIV (human immunodeficiency virus infection) ([facility]), Hyperlipidemia, Hypertension, IBD (inflammatory bowel disease), Kidney disease, Meningitis, Migraine, Muscle disorder, Osteoporosis, Parathyroid disorder ([facility]), Pituitary disease ([facility]), Pulmonary emphysema ([facility]), Seizure ([facility]), Sickle cell disease ([facility]), Stroke ([facility]), Substance abuse ([facility]), Thyroid disease, Tuberculosis, or Urinary tract infection.  She  has a past surgical history that includes tonsillectomy; other (2004?); other; pr breast augmentation with implant (Bilateral); and lumpectomy.    Family History   Problem Relation Age of Onset    Diabetes Mother         Deseased    Heart Disease Mother         4 blocked artieries    Cancer Father         Deseased    Cancer Maternal Aunt         colon    Diabetes Maternal Grandmother     Cancer Maternal Grandmother         breast    Breast Cancer Maternal Grandmother     Clotting Disorder Brother     No Known Problems Daughter     Other Son         downs syndrome    Atrial fibrillation Son     Hypertension Son     Hyperlipidemia Neg Hx     Stroke Neg Hx      Social  "History     Tobacco Use    Smoking status: Never    Smokeless tobacco: Never   Vaping Use    Vaping Use: Never used   Substance Use Topics    Alcohol use: No    Drug use: No       Patient Active Problem List    Diagnosis Date Noted    Dysfunction of left eustachian tube 06/11/2018    Allergic rhinitis 03/05/2013     No current outpatient medications on file.     No current facility-administered medications for this visit.     Allergies   Allergen Reactions    Azithromycin Rash     Z-PACK       Review of Systems   Constitutional: Negative for fever, chills and malaise/fatigue.   HENT: Negative for congestion.    Eyes: Negative for pain.   Respiratory: Negative for cough and shortness of breath.    Cardiovascular: Negative for chest pain and leg swelling.   Gastrointestinal: Negative for nausea, vomiting, abdominal pain and diarrhea.   Genitourinary: Negative for dysuria and hematuria.   Skin: Negative for rash.   Neurological: Negative for dizziness, focal weakness and headaches.   Endo/Heme/Allergies: Does not bruise/bleed easily.   Psychiatric/Behavioral: Negative for depression.  The patient is not nervous/anxious.      Objective:   /76 (BP Location: Right arm, Patient Position: Sitting, BP Cuff Size: Adult)   Pulse 94   Temp 36.1 °C (97 °F) (Temporal)   Resp 16   Ht 1.676 m (5' 6\")   Wt 66.2 kg (146 lb)   LMP 03/09/2015   SpO2 96%   BMI 23.57 kg/m²     Wt Readings from Last 4 Encounters:   05/24/23 66.2 kg (146 lb)   05/11/23 66.7 kg (147 lb)   04/10/23 67.7 kg (149 lb 3.2 oz)   01/06/23 63 kg (139 lb)       Physical Exam:  Constitutional: Well-developed and well-nourished. Not diaphoretic. No distress.   Skin: Skin is warm and dry. No rash noted.  Head: Atraumatic without lesions.  Eyes: Conjunctivae and extraocular motions are normal. Pupils are equal, round, and reactive to light. No scleral icterus.   Ears:  External ears unremarkable. Tympanic membranes clear and intact.  Nose: Nares patent. " Septum midline. Turbinates without erythema nor edema. No discharge.   Mouth/Throat: Tongue normal. Oropharynx is clear and moist. Posterior pharynx without erythema or exudates.  Neck: Supple, trachea midline. Normal range of motion. No thyromegaly present. No lymphadenopathy--cervical or supraclavicular.  Cardiovascular: Regular rate and rhythm, S1 and S2 without murmur, rubs, or gallops.    Respiratory: Effort normal. Clear to auscultation throughout. No adventitious sounds.   Abdomen: Soft, non tender, and without distention. Active bowel sounds in all four quadrants. No rebound, guarding, masses or HSM.  Left foot: Slight edema on the lateral portion of the left foot, mildly tender over the fifth metatarsal.  No redness, warmth, or ecchymosis  Extremities: No cyanosis, clubbing, erythema, nor edema. Distal pulses intact and symmetric.   Musculoskeletal: All major joints AROM full in all directions without pain.  Neurological: Alert and oriented x 3. Grossly non-focal. Strength and sensation grossly intact. DTRs 2+/3 and symmetric.   Psychiatric:  Behavior, mood, and affect are appropriate.    Assessment and Plan:     1. Well adult exam  Advised to increase physical activity as tolerated.  Reviewed diet control.    2. Dyslipidemia  -Moderately elevated.  We will try to gain better control through lifestyle modifications.  Will monitor repeat labs again in 6 months.  - Lipid Profile; Future    3. Intermittent diarrhea  -Resolved since cutting out gluten from her diet. Celiac's antibody panel, IgA was elevated, but reflex to T-TTG IgA was undetectable.  Possibly more of an insensitivity.  However, advised to continue to avoid gluten and follow-up with GI for consultation.    4. Left foot pain  -New concern.  Moderately tender, did have traumatic event.  Will obtain x-ray to rule out fracture.  If she has minimal improvement after 4 to 6 weeks follow-up for reevaluation.  Recommended ice, ibuprofen, rest.  -  DX-FOOT-COMPLETE 3+ LEFT; Future      Health maintenance:     Labs per orders  Immunizations per orders  Patient counseled about skin care, diet, supplements, and exercise.  Discussed  breast self exam, mammography screening, diet and exercise, colorectal cancer screening     Follow-up: Return in about 1 year (around 5/24/2024) for Annual PX.

## 2023-07-09 RX ORDER — OFLOXACIN 3 MG/ML
SOLUTION/ DROPS OPHTHALMIC
Qty: 10 ML | Refills: 0 | OUTPATIENT
Start: 2023-07-09

## 2023-07-10 ENCOUNTER — APPOINTMENT (OUTPATIENT)
Dept: MEDICAL GROUP | Facility: MEDICAL CENTER | Age: 63
End: 2023-07-10
Payer: COMMERCIAL

## 2023-07-10 ENCOUNTER — TELEPHONE (OUTPATIENT)
Dept: MEDICAL GROUP | Age: 63
End: 2023-07-10

## 2023-07-10 NOTE — TELEPHONE ENCOUNTER
Pt message my chart :   Hi Yolanda-     So,  I have a litter of 9 puppies who recently were diagnosed with roundworms, I know very common in dogs however I am worried about Gamaliel and I being exposed to that...     Gamaliel has only handled the puppies and I get the fun job of cleaning up after them... I always wash with soap and water after!  BUT-     Maybe I am being over cautious but would you recommend getting tested at this point, or wait for symptoms?  it's just so awful and I needed to ask you about it.      Thanks-Teddy

## 2023-07-18 ENCOUNTER — TELEMEDICINE (OUTPATIENT)
Dept: MEDICAL GROUP | Age: 63
End: 2023-07-18
Payer: COMMERCIAL

## 2023-07-18 VITALS — WEIGHT: 146 LBS | BODY MASS INDEX: 23.46 KG/M2 | RESPIRATION RATE: 16 BRPM | HEIGHT: 66 IN

## 2023-07-18 DIAGNOSIS — Z20.7 EXPOSURE TO PARASITIC DISEASE: ICD-10-CM

## 2023-07-18 PROCEDURE — 99214 OFFICE O/P EST MOD 30 MIN: CPT | Mod: 95 | Performed by: PHYSICIAN ASSISTANT

## 2023-07-18 PROCEDURE — RXMED WILLOW AMBULATORY MEDICATION CHARGE: Performed by: PHYSICIAN ASSISTANT

## 2023-07-18 RX ORDER — ALBENDAZOLE 200 MG/1
400 TABLET, FILM COATED ORAL ONCE
Qty: 2 TABLET | Refills: 0 | Status: SHIPPED | OUTPATIENT
Start: 2023-07-18 | End: 2023-07-21

## 2023-07-18 ASSESSMENT — FIBROSIS 4 INDEX: FIB4 SCORE: 1.29

## 2023-07-18 NOTE — PROGRESS NOTES
"Virtual Visit: Established Patient   This visit was conducted via Zoom using secure and encrypted videoconferencing technology.   The patient was in their home in the state of [place].    The patient's identity was confirmed and verbal consent was obtained for this virtual visit.     Subjective:   CC:   Chief Complaint   Patient presents with    Other     Questions about puppy who had worms       Madhavi Baires is a 63 y.o. female presenting with concerns of exposure to roundworm.  Her dog had a litter of 9 puppies, subsequently she noted worms in their stool, had them tested, and they were positive for roundworm.  She is concerned as she is the one that was cleaning up the majority of their stool and diarrhea, she did wear gloves when possible, was washing her hands frequently, but prior to knowing that they had it, she was cuddling a lot with them.  She denies fever, cough, abdominal pain, diarrhea, any obvious parasites in her stool.      ROS   See above    Current medicines (including changes today)  Current Outpatient Medications   Medication Sig Dispense Refill    albendazole (ALBENZA) 200 MG Tab Take 2 Tablets by mouth one time for 1 dose. 2 Tablet 0     No current facility-administered medications for this visit.       Patient Active Problem List    Diagnosis Date Noted    Dysfunction of left eustachian tube 06/11/2018    Allergic rhinitis 03/05/2013        Objective:   Resp 16   Ht 1.676 m (5' 6\")   Wt 66.2 kg (146 lb)   LMP 03/09/2015   BMI 23.57 kg/m²     Physical Exam:  Constitutional: Alert, no distress, well-groomed.  Skin: No rashes in visible areas.  Eye: Round. Conjunctiva clear, lids normal. No icterus.   ENMT: Lips pink without lesions, good dentition, moist mucous membranes. Phonation normal.  Neck: No masses, no thyromegaly. Moves freely without pain.  Respiratory: Unlabored respiratory effort, no cough or audible wheeze  Psych: Alert and oriented x3, normal affect and mood. "     Assessment and Plan:   The following treatment plan was discussed:     1. Exposure to parasitic disease  -Discussed with patient, that it can take up to 40 days to have a positive stool, so at this point with the exposure that she had, feel that it would be appropriate to just treat prophylactically, as it is a one-time dose.  We will then check her stool in 2 months to ensure eradication.  - albendazole (ALBENZA) 200 MG Tab; Take 2 Tablets by mouth one time for 1 dose.  Dispense: 2 Tablet; Refill: 0  - Complete O&P; Future      Follow-up: Return if symptoms worsen or fail to improve.

## 2023-07-20 ENCOUNTER — TELEPHONE (OUTPATIENT)
Dept: MEDICAL GROUP | Age: 63
End: 2023-07-20
Payer: COMMERCIAL

## 2023-07-20 ENCOUNTER — PHARMACY VISIT (OUTPATIENT)
Dept: PHARMACY | Facility: MEDICAL CENTER | Age: 63
End: 2023-07-20
Payer: COMMERCIAL

## 2023-07-20 NOTE — TELEPHONE ENCOUNTER
Pt message my chart :   I would really like to have 2 of the stool kits, one for now and submit one again in 40 days, how can we make that happen?     Thanks

## 2023-07-21 NOTE — TELEPHONE ENCOUNTER
Phone Number Called: [phone]      Call outcome: Spoke to patient regarding message below.    Message: Called and spoke with patient and informed her that the stool sample has been ordered. I informed her that all she has to do is  the kit and then return it to the lab. I also informed her that we will do a stool sample now and then again in 40 days.

## 2023-07-24 ENCOUNTER — HOSPITAL ENCOUNTER (OUTPATIENT)
Facility: MEDICAL CENTER | Age: 63
End: 2023-07-24
Attending: PHYSICIAN ASSISTANT
Payer: COMMERCIAL

## 2023-07-24 DIAGNOSIS — Z20.7 EXPOSURE TO PARASITIC DISEASE: ICD-10-CM

## 2023-07-24 PROCEDURE — 87209 SMEAR COMPLEX STAIN: CPT

## 2023-07-24 PROCEDURE — 87177 OVA AND PARASITES SMEARS: CPT

## 2023-07-29 LAB — OVA AND PARASITE, FECAL INTERPRETATION Q0595: NEGATIVE

## 2023-07-31 ENCOUNTER — TELEPHONE (OUTPATIENT)
Dept: MEDICAL GROUP | Age: 63
End: 2023-07-31
Payer: COMMERCIAL

## 2023-07-31 DIAGNOSIS — Z20.7 EXPOSURE TO PARASITIC DISEASE: ICD-10-CM

## 2023-07-31 NOTE — TELEPHONE ENCOUNTER
Pt message my chart is suppose to repeat the stool process please adviser :  Good morning-  My test results came back negative but was recommended to repeat the test 3 times 5-7 days apart. please advise.

## 2023-08-04 ENCOUNTER — APPOINTMENT (OUTPATIENT)
Dept: MEDICAL GROUP | Facility: MEDICAL CENTER | Age: 63
End: 2023-08-04
Payer: COMMERCIAL

## 2023-08-18 ENCOUNTER — APPOINTMENT (OUTPATIENT)
Dept: MEDICAL GROUP | Facility: MEDICAL CENTER | Age: 63
End: 2023-08-18
Payer: COMMERCIAL

## 2023-08-30 ENCOUNTER — TELEPHONE (OUTPATIENT)
Dept: MEDICAL GROUP | Age: 63
End: 2023-08-30
Payer: COMMERCIAL

## 2023-08-30 NOTE — TELEPHONE ENCOUNTER
Patient message my chart please advise  :  I'm due to have my other stool sample done, do I need another lab slip?   I have the test just not sure if I need another lab slip. Thanks.

## 2023-09-01 ENCOUNTER — HOSPITAL ENCOUNTER (OUTPATIENT)
Facility: MEDICAL CENTER | Age: 63
End: 2023-09-01
Attending: PHYSICIAN ASSISTANT
Payer: COMMERCIAL

## 2023-09-01 ENCOUNTER — APPOINTMENT (OUTPATIENT)
Dept: MEDICAL GROUP | Facility: MEDICAL CENTER | Age: 63
End: 2023-09-01
Payer: COMMERCIAL

## 2023-09-01 DIAGNOSIS — Z20.7 EXPOSURE TO PARASITIC DISEASE: ICD-10-CM

## 2023-09-01 LAB
G LAMBLIA+C PARVUM AG STL QL RAPID: NORMAL
SIGNIFICANT IND 70042: NORMAL
SITE SITE: NORMAL
SOURCE SOURCE: NORMAL

## 2023-09-01 PROCEDURE — 87328 CRYPTOSPORIDIUM AG IA: CPT

## 2023-09-01 PROCEDURE — 87329 GIARDIA AG IA: CPT

## 2023-10-04 ENCOUNTER — OFFICE VISIT (OUTPATIENT)
Dept: MEDICAL GROUP | Facility: MEDICAL CENTER | Age: 63
End: 2023-10-04
Payer: COMMERCIAL

## 2023-10-04 VITALS
DIASTOLIC BLOOD PRESSURE: 68 MMHG | SYSTOLIC BLOOD PRESSURE: 100 MMHG | HEART RATE: 69 BPM | OXYGEN SATURATION: 96 % | HEIGHT: 66 IN | TEMPERATURE: 98.3 F | WEIGHT: 148 LBS | BODY MASS INDEX: 23.78 KG/M2

## 2023-10-04 DIAGNOSIS — E16.2 HYPOGLYCEMIA: ICD-10-CM

## 2023-10-04 DIAGNOSIS — R19.5 LOOSE STOOLS: ICD-10-CM

## 2023-10-04 PROCEDURE — 99214 OFFICE O/P EST MOD 30 MIN: CPT | Performed by: STUDENT IN AN ORGANIZED HEALTH CARE EDUCATION/TRAINING PROGRAM

## 2023-10-04 PROCEDURE — 3074F SYST BP LT 130 MM HG: CPT | Performed by: STUDENT IN AN ORGANIZED HEALTH CARE EDUCATION/TRAINING PROGRAM

## 2023-10-04 PROCEDURE — 3078F DIAST BP <80 MM HG: CPT | Performed by: STUDENT IN AN ORGANIZED HEALTH CARE EDUCATION/TRAINING PROGRAM

## 2023-10-04 RX ORDER — IBUPROFEN 200 MG
200 TABLET ORAL EVERY 6 HOURS PRN
COMMUNITY

## 2023-10-04 ASSESSMENT — ENCOUNTER SYMPTOMS
VOMITING: 0
PALPITATIONS: 0
WEIGHT LOSS: 0
CHILLS: 0
WHEEZING: 0
NAUSEA: 0
SHORTNESS OF BREATH: 0
HEADACHES: 0
DIZZINESS: 0
FEVER: 0

## 2023-10-04 ASSESSMENT — FIBROSIS 4 INDEX: FIB4 SCORE: 1.29

## 2023-10-04 NOTE — LETTER
[facility]  Cole Miller M.D.  [address]  [place] [address]  Fax: [phone]   Authorization for Release/Disclosure of   Protected Health Information   Name: ABBIE MACK : 1960 SSN: [id number]   Address: [address] [place] [address]  [place] [address] Phone:    [phone] (work)   I authorize the entity listed below to release/disclose the PHI below to:   [facility]/Cole Miller M.D. and Cole Miller M.D.   Provider or Entity Name:  Filomena Oconnell    Address   City, State, [facility]   Phone:      Fax:     Reason for request: continuity of care   Information to be released:    [  ] LAST COLONOSCOPY,  including any PATH REPORT and follow-up  [  ] LAST FIT/COLOGUARD RESULT [  ] LAST DEXA  [  x] LAST MAMMOGRAM  [  x] LAST PAP  [  ] LAST LABS [  ] RETINA EXAM REPORT  [  ] IMMUNIZATION RECORDS  [  ] Release all info      [  ] Check here and initial the line next to each item to release ALL health information INCLUDING  _____ Care and treatment for drug and / or alcohol abuse  _____ HIV testing, infection status, or AIDS  _____ Genetic Testing    DATES OF SERVICE OR TIME PERIOD TO BE DISCLOSED: _____________  I understand and acknowledge that:  * This Authorization may be revoked at any time by you in writing, except if your health information has already been used or disclosed.  * Your health information that will be used or disclosed as a result of you signing this authorization could be re-disclosed by the recipient. If this occurs, your re-disclosed health information may no longer be protected by State or Federal laws.  * You may refuse to sign this Authorization. Your refusal will not affect your ability to obtain treatment.  * This Authorization becomes effective upon signing and will  on (date) __________.      If no date is indicated, this Authorization will  one (1) year from the signature date.    Name: ABBIE MACK  Signature: Date:   10/4/2023      PLEASE FAX REQUESTED RECORDS BACK TO: [phone]

## 2023-10-04 NOTE — PROGRESS NOTES
Subjective:     CC:     HPI:   ABBIE presents today with    Prior patient of Yolanda Zelaya P.A.-C.   [facility]  Last seen 7/18/2023 for exposure to round warm- prescribed albendazole and O&P - negative  Last primary care lab 5/17/2023  - CBC was fine, CMP was fine renal function stable LFT not elevated, cholesterol mildly elevated,  celiac was negative thyroid was fine  ----  Presents TODAY to establish    Occult blood 2021 negative, cologuard negative  Scheduled to see gastroenterology.      Problem   Hypoglycemia    Report history of hypoglycemia since teenager  Report history with difficulty fasting for colonoscopy  Reports she does eat at 9 pm due to concern for low blood sugar and wake up at 3 but does not always have to eat around 3 am.     - hx of hypoglycemic symptom while on gatorade and broth.   - recommend monitoring fasting blood sugar and when she feels symptomatic  - may consider lab cortisol, cbc, cmp, c peptide, insuline, UA for ketones       Loose Stools    This is chronically condition for past 6 months or more, more frequently since ~3/2023 ( Every other week)   Diarrhea episodes: 1 loose stool per day.   Fever: Denies  Recent abx: Denies  Recent travel: Denies travel outside [place]  Stool: no oil on toilet, fobt negative.   Pattern/ stool diary-   Medication/ supplements: -   - ibuprofen prn  - supplement - denies    Prior work up neg ttg. Cbc is fine, cmp is fine. Will obtain calprotectin and lactoferrin for completion. Patient has fu with GI. Journal and fodmap          Health Maintenance:     ROS:  Review of Systems   Constitutional:  Negative for chills, fever and weight loss.   HENT:  Negative for hearing loss.    Respiratory:  Negative for shortness of breath and wheezing.    Cardiovascular:  Negative for chest pain and palpitations.   Gastrointestinal:  Negative for nausea and vomiting.   Genitourinary:  Negative for frequency and urgency.   Skin:  Negative for rash.  "  Neurological:  Negative for dizziness and headaches.       Objective:     Exam:  /68 (BP Location: Left arm, Patient Position: Sitting)   Pulse 69   Temp 36.8 °C (98.3 °F) (Temporal)   Ht 1.676 m (5' 6\")   Wt 67.1 kg (148 lb)   LMP 03/09/2015   SpO2 96%   BMI 23.89 kg/m²  Body mass index is 23.89 kg/m².    Physical Exam  Constitutional:       Appearance: Normal appearance.   Cardiovascular:      Rate and Rhythm: Normal rate and regular rhythm.   Pulmonary:      Effort: Pulmonary effort is normal.      Breath sounds: Normal breath sounds.   Musculoskeletal:      Cervical back: Normal range of motion and neck supple.   Lymphadenopathy:      Cervical: No cervical adenopathy.   Neurological:      Mental Status: She is alert.           Labs:     Assessment & Plan:     63 y.o. female with the following -     1. Loose stools  Chronic, intermittent stable  Lab reviewed as per HPI  Plan  - CALPROTECTIN,FECAL; Future  - FECAL LACTOFERRIN QUALITATIVE; Future    2. Hypoglycemia  Chronic, stable, intermittent  Patient will tract FBG and symptomatic BG  May consider labs noted on HPI for further work up        Return in about 6 months (around 4/4/2024).    Please note that this dictation was created using voice recognition software. I have made every reasonable attempt to correct obvious errors, but I expect that there are errors of grammar and possibly content that I did not discover before finalizing the note.        "

## 2024-02-20 ENCOUNTER — APPOINTMENT (OUTPATIENT)
Dept: MEDICAL GROUP | Facility: IMAGING CENTER | Age: 64
End: 2024-02-20
Payer: COMMERCIAL

## 2024-04-19 ENCOUNTER — APPOINTMENT (OUTPATIENT)
Dept: MEDICAL GROUP | Facility: IMAGING CENTER | Age: 64
End: 2024-04-19
Payer: COMMERCIAL

## 2024-04-22 ENCOUNTER — TELEMEDICINE (OUTPATIENT)
Dept: MEDICAL GROUP | Facility: MEDICAL CENTER | Age: 64
End: 2024-04-22
Payer: COMMERCIAL

## 2024-04-22 VITALS — HEIGHT: 66 IN | BODY MASS INDEX: 23.78 KG/M2 | WEIGHT: 148 LBS

## 2024-04-22 DIAGNOSIS — E78.49 OTHER HYPERLIPIDEMIA: ICD-10-CM

## 2024-04-22 DIAGNOSIS — R00.2 PALPITATION: ICD-10-CM

## 2024-04-22 DIAGNOSIS — R19.5 LOOSE STOOLS: ICD-10-CM

## 2024-04-22 PROCEDURE — 99214 OFFICE O/P EST MOD 30 MIN: CPT | Performed by: STUDENT IN AN ORGANIZED HEALTH CARE EDUCATION/TRAINING PROGRAM

## 2024-04-22 ASSESSMENT — ENCOUNTER SYMPTOMS
PND: 0
PALPITATIONS: 0
WHEEZING: 0
CLAUDICATION: 0
ORTHOPNEA: 0
SHORTNESS OF BREATH: 0

## 2024-04-22 ASSESSMENT — PATIENT HEALTH QUESTIONNAIRE - PHQ9: CLINICAL INTERPRETATION OF PHQ2 SCORE: 0

## 2024-04-22 ASSESSMENT — FIBROSIS 4 INDEX: FIB4 SCORE: 1.29

## 2024-04-22 NOTE — PROGRESS NOTES
"Virtual Visit: Established Patient   This visit was conducted via Zoom using secure and encrypted videoconferencing technology.   The patient was in their home in the [facility].    The patient's identity was confirmed and verbal consent was obtained for this virtual visit.    Subjective:   CC:   Chief Complaint   Patient presents with    Heart Problem     Palpitations     Madhavi Baires is a 63 y.o. female presenting for evaluation and management of:    Present for virtual visit for palpitation.     Weird feeling in the morning. Denies lightheadedness, dizziness. Lasting minutes. I noticed increase in heart palpitations. I have had it my whole life but not recently. I have few issue with my heart. I had it worked up couple.   Pulse ox jumping 60- 70-90s.   - couple times per week.     Age of onset- palpitations since 20s   Duration- lasting minutes  Syncope/ presyncope- no fainting  Denies early fhx of cardiac disease, conduction disease      ROS   Review of Systems   Respiratory:  Negative for shortness of breath and wheezing.    Cardiovascular:  Negative for chest pain, palpitations, orthopnea, claudication, leg swelling and PND.        Current medicines (including changes today)  Current Outpatient Medications   Medication Sig Dispense Refill    ibuprofen (MOTRIN) 200 MG Tab Take 200 mg by mouth every 6 hours as needed.       No current facility-administered medications for this visit.       Patient Active Problem List    Diagnosis Date Noted    Other hyperlipidemia 04/22/2024    Hypoglycemia 10/04/2023    Loose stools 10/04/2023    Dysfunction of left eustachian tube 06/11/2018    Allergic rhinitis 03/05/2013        Objective:   Ht 1.676 m (5' 6\")   Wt 67.1 kg (148 lb)   LMP 03/09/2015   BMI 23.89 kg/m²     Physical Exam:  Constitutional: Alert, no distress, well-groomed.  Skin: No rashes in visible areas.  Eye: Round. Conjunctiva clear, lids normal. No icterus.     Neck: No masses, no " thyromegaly. Moves freely without pain.  Respiratory: Unlabored respiratory effort, no cough or audible wheeze  Psych: Alert and oriented x3, normal affect and mood.     Assessment and Plan:   The following treatment plan was discussed:     1. Palpitation  Chronic stable  Intermittent increased in frequency  - RIH ZIO PATCH MONITOR; Future  - Lipid Profile; Future  - Comp Metabolic Panel; Future  - TSH WITH REFLEX TO FT4; Future    2. Loose stools  Chronic stable  Reviewed GI labs with patient, elevated igA, ttg within normal limit  - Lipid Profile; Future  - Comp Metabolic Panel; Future    3. Other hyperlipidemia  Chronic stable   Low ascvd, disucsss LDL/HDL ratio < 3  Patient working on lifestyle will repeat lab   - Lipid Profile; Future  - Comp Metabolic Panel; Future  - TSH WITH REFLEX TO FT4; Future      Follow-up: Return in about 3 months (around 7/22/2024).

## 2024-04-24 ENCOUNTER — NON-PROVIDER VISIT (OUTPATIENT)
Dept: CARDIOLOGY | Facility: MEDICAL CENTER | Age: 64
End: 2024-04-24
Attending: STUDENT IN AN ORGANIZED HEALTH CARE EDUCATION/TRAINING PROGRAM
Payer: COMMERCIAL

## 2024-04-24 DIAGNOSIS — R00.2 PALPITATION: ICD-10-CM

## 2024-04-24 PROCEDURE — 93246 EXT ECG>7D<15D RECORDING: CPT

## 2024-04-24 NOTE — PROGRESS NOTES
Patient enrolled in the 14 day o Holter monitoring program per Cole Miller MD.  >Office hook-up, serial # [id number].  >Currently pending EOS.

## 2024-05-01 ENCOUNTER — TELEPHONE (OUTPATIENT)
Dept: CARDIOLOGY | Facility: MEDICAL CENTER | Age: 64
End: 2024-05-01
Payer: COMMERCIAL

## 2024-05-06 ENCOUNTER — APPOINTMENT (OUTPATIENT)
Dept: CARDIOLOGY | Facility: MEDICAL CENTER | Age: 64
End: 2024-05-06
Attending: INTERNAL MEDICINE
Payer: COMMERCIAL

## 2024-05-10 ENCOUNTER — TELEPHONE (OUTPATIENT)
Dept: CARDIOLOGY | Facility: MEDICAL CENTER | Age: 64
End: 2024-05-10
Payer: COMMERCIAL

## 2024-05-10 NOTE — TELEPHONE ENCOUNTER
MEAGHAN EOS to BD for review on 5/13/24 as ADD  Monitor noted:  1 run of SVT,  with an avg rate of 106 BPM  Sinus rhythm,  with an avg rate of 62 BPM  3 patient events, sinus 75-81

## 2024-05-30 ENCOUNTER — APPOINTMENT (OUTPATIENT)
Dept: MEDICAL GROUP | Facility: IMAGING CENTER | Age: 64
End: 2024-05-30
Payer: COMMERCIAL

## 2024-05-30 VITALS
WEIGHT: 149 LBS | RESPIRATION RATE: 15 BRPM | SYSTOLIC BLOOD PRESSURE: 112 MMHG | HEART RATE: 75 BPM | BODY MASS INDEX: 23.95 KG/M2 | DIASTOLIC BLOOD PRESSURE: 70 MMHG | TEMPERATURE: 97.7 F | OXYGEN SATURATION: 96 % | HEIGHT: 66 IN

## 2024-05-30 DIAGNOSIS — E16.2 HYPOGLYCEMIA: ICD-10-CM

## 2024-05-30 DIAGNOSIS — E78.49 OTHER HYPERLIPIDEMIA: ICD-10-CM

## 2024-05-30 DIAGNOSIS — L98.9 LESION OF SKIN OF SCALP: ICD-10-CM

## 2024-05-30 DIAGNOSIS — Z12.11 SCREENING FOR COLON CANCER: ICD-10-CM

## 2024-05-30 DIAGNOSIS — Z12.83 SKIN CANCER SCREENING: ICD-10-CM

## 2024-05-30 DIAGNOSIS — Z78.0 MENOPAUSE: ICD-10-CM

## 2024-05-30 DIAGNOSIS — Z00.6 RESEARCH STUDY PATIENT: ICD-10-CM

## 2024-05-30 RX ORDER — IBUPROFEN 200 MG
200 TABLET ORAL EVERY 8 HOURS PRN
COMMUNITY

## 2024-05-30 ASSESSMENT — FIBROSIS 4 INDEX: FIB4 SCORE: 1.31

## 2024-05-30 NOTE — ASSESSMENT & PLAN NOTE
Elevated LDL  Healthful lifestyle measures  The 10-year ASCVD risk score (Rafiq CAN, et al., 2019) is: 4.5%    Values used to calculate the score:      Age: 64 years      Sex: Female      Is Non- : No      Diabetic: No      Tobacco smoker: No      Systolic Blood Pressure: 112 mmHg      Is BP treated: No      HDL Cholesterol: 59 mg/dL      Total Cholesterol: 224 mg/dL

## 2024-05-30 NOTE — ASSESSMENT & PLAN NOTE
Report history of hypoglycemia since teenager  Report history with difficulty fasting for colonoscopy  Reports she does eat at 9 pm due to concern for low blood sugar and wake up at 3 but does not always have to eat around 3 am.     - hx of hypoglycemic symptom while on gatorade and broth.   - recommend monitoring fasting blood sugar and when she feels symptomatic  - may consider lab cortisol, cbc, cmp, c peptide, insulin, UA for ketones  - referral to endocrinology for further evaluation

## 2024-05-30 NOTE — PROGRESS NOTES
"New Patient    ABBIE MACK is a 64 y.o. female who presents today with the following:    CC:   Chief Complaint   Patient presents with    Establish Care       HPI:       History of Present Illness      Problem   Other Hyperlipidemia    Elevated LDL  Healthful lifestyle measures  The 10-year ASCVD risk score (Rafiq CAN, et al., 2019) is: 4.5%    Values used to calculate the score:      Age: 64 years      Sex: Female      Is Non- : No      Diabetic: No      Tobacco smoker: No      Systolic Blood Pressure: 112 mmHg      Is BP treated: No      HDL Cholesterol: 59 mg/dL      Total Cholesterol: 224 mg/dL       Hypoglycemia    Report history of hypoglycemia since teenager  Report history with difficulty fasting for colonoscopy  Reports she does eat at 9 pm due to concern for low blood sugar and wake up at 3 but does not always have to eat around 3 am.     - hx of hypoglycemic symptom while on gatorade and broth.   - recommend monitoring fasting blood sugar and when she feels symptomatic  - may consider lab cortisol, cbc, cmp, c peptide, insulin, UA for ketones  - referral to endocrinology for further evaluation       Allergic Rhinitis    Using flonase prn  Sinus rinses prn  Following with ENT          Current Outpatient Medications   Medication Sig    ibuprofen (MOTRIN) 200 MG Tab Take 200 mg by mouth every 8 hours as needed. A few a month     Allergies   Allergen Reactions    Azithromycin Rash     Z-PACK       Allergies, past medical history, past surgical history, medications, family history, social history reviewed and updated.    ROS Please see HPI    Physical Exam  Vitals: /70 (BP Location: Left arm, Patient Position: Sitting, BP Cuff Size: Adult)   Pulse 75   Temp 36.5 °C (97.7 °F) (Temporal)   Resp 15   Ht 1.676 m (5' 6\")   Wt 67.6 kg (149 lb)   LMP 03/09/2015   SpO2 96%   BMI 24.05 kg/m²   Physical Exam  Skin:            Comments: Subcutaneous lesion on " scalp  Cherry papules on trunk         Assessment and Plan    1. Lesion of skin of scalp  - Referral to Dermatology    2. Skin cancer screening  - Referral to Dermatology    3. Other hyperlipidemia  - TSH WITH REFLEX TO FT4; Future  - Lipoprotein (a); Future  - CRP HIGH SENSITIVE (CARDIAC); Future  - Lipid Profile; Future  Healthful lifestyle measures  The 10-year ASCVD risk score (Rafiq CAN, et al., 2019) is: 4.5%    Values used to calculate the score:      Age: 64 years      Sex: Female      Is Non- : No      Diabetic: No      Tobacco smoker: No      Systolic Blood Pressure: 112 mmHg      Is BP treated: No      HDL Cholesterol: 59 mg/dL      Total Cholesterol: 224 mg/dL  Omega-3 rich foods or fish oil    4. Hypoglycemia  - CBC WITH DIFFERENTIAL; Future  - Comp Metabolic Panel; Future  - URINALYSIS,CULTURE IF INDICATED; Future  - Referral to Endocrinology    5. Research study patient  - Referral to Genetic Research Studies    6. Menopause  - DS-BONE DENSITY STUDY (DEXA); Future    7. Screening for colon cancer  - Referral to GI for Colonoscopy      Follow-up:Return in about 3 months (around 8/30/2024), or if symptoms worsen or fail to improve.    This note was created using voice recognition software. There may be unintended errors in spelling, grammar or content.

## 2024-06-03 ENCOUNTER — RESEARCH ENCOUNTER (OUTPATIENT)
Dept: LAB | Facility: MEDICAL CENTER | Age: 64
End: 2024-06-03
Payer: COMMERCIAL

## 2024-06-03 DIAGNOSIS — E16.2 HYPOGLYCEMIA: ICD-10-CM

## 2024-06-03 DIAGNOSIS — Z00.6 RESEARCH STUDY PATIENT: ICD-10-CM

## 2024-06-03 NOTE — RESEARCH NOTE
Patient has been referred by Madhu Flores M.D. Sent initial referral follow-up message with instructions to locate and sign consent form(s). The following consent form(s) have been pushed to the patient's MyChart: MATEO

## 2024-06-03 NOTE — PROGRESS NOTES
Hypoglycemia  -     CORTISOL; Future  -     PROINSULIN  -     INSULIN, FREE/TOTAL; Future  -     C-PEPTIDE; Future

## 2024-06-06 ENCOUNTER — TELEPHONE (OUTPATIENT)
Dept: CARDIOLOGY | Facility: MEDICAL CENTER | Age: 64
End: 2024-06-06
Payer: COMMERCIAL

## 2024-06-06 NOTE — TELEPHONE ENCOUNTER
Attempted to call pt in regards to new pt appointment. Attempting to get info of the last cardiologist pt seen. Last requested was at [facility] but [facility] states to not have pt records

## 2024-06-10 ENCOUNTER — OFFICE VISIT (OUTPATIENT)
Dept: CARDIOLOGY | Facility: MEDICAL CENTER | Age: 64
End: 2024-06-10
Attending: INTERNAL MEDICINE
Payer: COMMERCIAL

## 2024-06-10 ENCOUNTER — PATIENT MESSAGE (OUTPATIENT)
Dept: CARDIOLOGY | Facility: MEDICAL CENTER | Age: 64
End: 2024-06-10

## 2024-06-10 VITALS
WEIGHT: 152 LBS | OXYGEN SATURATION: 95 % | BODY MASS INDEX: 24.43 KG/M2 | RESPIRATION RATE: 16 BRPM | SYSTOLIC BLOOD PRESSURE: 112 MMHG | DIASTOLIC BLOOD PRESSURE: 72 MMHG | HEART RATE: 67 BPM | HEIGHT: 66 IN

## 2024-06-10 DIAGNOSIS — R00.2 PALPITATIONS: ICD-10-CM

## 2024-06-10 DIAGNOSIS — E78.49 OTHER HYPERLIPIDEMIA: ICD-10-CM

## 2024-06-10 LAB — EKG IMPRESSION: NORMAL

## 2024-06-10 PROCEDURE — 3074F SYST BP LT 130 MM HG: CPT | Performed by: INTERNAL MEDICINE

## 2024-06-10 PROCEDURE — 3078F DIAST BP <80 MM HG: CPT | Performed by: INTERNAL MEDICINE

## 2024-06-10 PROCEDURE — 93005 ELECTROCARDIOGRAM TRACING: CPT | Performed by: INTERNAL MEDICINE

## 2024-06-10 PROCEDURE — 99211 OFF/OP EST MAY X REQ PHY/QHP: CPT | Performed by: INTERNAL MEDICINE

## 2024-06-10 PROCEDURE — 99204 OFFICE O/P NEW MOD 45 MIN: CPT | Performed by: INTERNAL MEDICINE

## 2024-06-10 PROCEDURE — 93010 ELECTROCARDIOGRAM REPORT: CPT | Performed by: INTERNAL MEDICINE

## 2024-06-10 RX ORDER — FLUTICASONE PROPIONATE 50 MCG
1 SPRAY, SUSPENSION (ML) NASAL DAILY
COMMUNITY

## 2024-06-10 RX ORDER — CHLORAL HYDRATE 500 MG
1000 CAPSULE ORAL
COMMUNITY

## 2024-06-10 ASSESSMENT — FIBROSIS 4 INDEX: FIB4 SCORE: 1.31

## 2024-06-10 NOTE — RESEARCH NOTE
Confirmed with the participant which designated provider they would like study results shared with. Patient will have an opportunity to share the results with any providers of their choosing in the future by accessing their results from [employer].

## 2024-06-10 NOTE — PROGRESS NOTES
"CARDIOLOGY NEW PATIENT CONSULTATION    PCP: Madhu Flores M.D.    1. Other hyperlipidemia    2. Palpitations        ABBIE MACK has borderline ASCVD risk.  Accordingly I recommended a calcium score, dietary modification, repeat lipid profile in 6 weeks.  We did discuss the potential utility of statin medication.    Palpitations have resolved and the monitor is reassuring for absence of important arrhythmia.  She may resume modest caffeine.  Should recurrent arrhythmia concerns arise she will make recordings on her smart watch and may send them to me for review    Follow up: as needed      History: ABBIE MACK is a 64 y.o. female without significant medical history presenting for assessment of palpitations and monitor results.  She is now feeling well, had cut out caffeine intake.  Was feeling off several months ago.  Had spells while wearing the monitor and no abnormal rhythms.  Her mother had bypass.  The patient has had high LDL cholesterol since 2018, reports consuming a vegetarian diet at that time.      PE:  /72 (BP Location: Left arm, Patient Position: Sitting, BP Cuff Size: Adult)   Pulse 67   Resp 16   Ht 1.676 m (5' 6\")   Wt 68.9 kg (152 lb)   LMP 03/09/2015   SpO2 95%   BMI 24.53 kg/m²   GEN: NAD  RESP: CTAB  CVS: RRR, No M/R/G  ABD: Soft, NT/ND  EXT: WWP, no edema    Studies interpreted by me: ECG monitor (Event/Holter): Normal    The 10-year ASCVD risk score (Rafiq CAN, et al., 2019) is: 4.5%    Studies  Lab Results   Component Value Date/Time    CHOLSTRLTOT 224 (H) 05/17/2023 06:14 AM     (H) 05/17/2023 06:14 AM    HDL 59 05/17/2023 06:14 AM    TRIGLYCERIDE 100 05/17/2023 06:14 AM       Lab Results   Component Value Date/Time    SODIUM 141 05/17/2023 06:14 AM    POTASSIUM 3.7 05/17/2023 06:14 AM    CHLORIDE 105 05/17/2023 06:14 AM    CO2 24 05/17/2023 06:14 AM    GLUCOSE 99 05/17/2023 06:14 AM    BUN 19 05/17/2023 06:14 AM    CREATININE 0.67 " "05/17/2023 06:14 AM      No results found for: \"PROTHROMBTM\", \"INR\"   Lab Results   Component Value Date/Time    WBC 5.0 05/17/2023 06:14 AM    RBC 5.28 05/17/2023 06:14 AM    HEMOGLOBIN 15.4 05/17/2023 06:14 AM    HEMATOCRIT 48.6 (H) 05/17/2023 06:14 AM    MCV 92.0 05/17/2023 06:14 AM    MCH 29.2 05/17/2023 06:14 AM    MCHC 31.7 (L) 05/17/2023 06:14 AM    MPV 11.4 05/17/2023 06:14 AM    NEUTSPOLYS 41.70 (L) 05/17/2023 06:14 AM    LYMPHOCYTES 44.70 (H) 05/17/2023 06:14 AM    MONOCYTES 10.00 05/17/2023 06:14 AM    EOSINOPHILS 2.40 05/17/2023 06:14 AM    BASOPHILS 1.00 05/17/2023 06:14 AM        Past Medical History:   Diagnosis Date    Allergic rhinitis 03/05/2013    Allergic rhinitis     Arthritis     Chronic rhinitis 03/05/2013    Indigestion     Other hyperlipidemia 4/22/2024     Past Surgical History:   Procedure Laterality Date    LUMPECTOMY      OTHER  2004?    breast implants    OTHER      foregin body removal on toe    MN BREAST AUGMENTATION WITH IMPLANT Bilateral     TONSILLECTOMY       Allergies   Allergen Reactions    Azithromycin Rash     Z-PACK     Outpatient Encounter Medications as of 6/10/2024   Medication Sig Dispense Refill    fluticasone (FLONASE) 50 MCG/ACT nasal spray Administer 1 Spray into affected nostril(S) every day.      multivitamin Tab Take 1 Tablet by mouth every day.      Omega-3 Fatty Acids (FISH OIL) 1000 MG Cap capsule Take 1,000 mg by mouth 3 times a day with meals.      ibuprofen (MOTRIN) 200 MG Tab Take 200 mg by mouth every 8 hours as needed. A few a month       No facility-administered encounter medications on file as of 6/10/2024.     Social History     Socioeconomic History    Marital status:      Spouse name: Not on file    Number of children: Not on file    Years of education: Not on file    Highest education level: Bachelor's degree (e.g., BA, AB, BS)   Occupational History    Not on file   Tobacco Use    Smoking status: Never    Smokeless tobacco: Never   Vaping " Use    Vaping status: Never Used   Substance and Sexual Activity    Alcohol use: No    Drug use: No    Sexual activity: Not Currently     Birth control/protection: Post-Menopausal     Comment:  vasectomy   Other Topics Concern    Not on file   Social History Narrative    Not on file     Social Determinants of Health     Financial Resource Strain: Medium Risk (5/21/2023)    Overall Financial Resource Strain (CARDIA)     Difficulty of Paying Living Expenses: Somewhat hard   Food Insecurity: No Food Insecurity (5/21/2023)    Hunger Vital Sign     Worried About Running Out of Food in the Last Year: Never true     Ran Out of Food in the Last Year: Never true   Transportation Needs: No Transportation Needs (5/21/2023)    PRAPARE - Transportation     Lack of Transportation (Medical): No     Lack of Transportation (Non-Medical): No   Physical Activity: Insufficiently Active (5/21/2023)    Exercise Vital Sign     Days of Exercise per Week: 3 days     Minutes of Exercise per Session: 30 min   Stress: Stress Concern Present (5/21/2023)    [employer] - Occupational Stress Questionnaire     Feeling of Stress : To some extent   Social Connections: Socially Isolated (5/21/2023)    Social Connection and Isolation Panel [NHANES]     Frequency of Communication with Friends and Family: More than three times a week     Frequency of Social Gatherings with Friends and Family: Never     Attends Tenriism Services: Never     Active Member of Clubs or Organizations: No     Attends Club or Organization Meetings: Never     Marital Status:    Intimate Partner Violence: Not on file   Housing Stability: Low Risk  (5/21/2023)    Housing Stability Vital Sign     Unable to Pay for Housing in the Last Year: No     Number of Places Lived in the Last Year: 1     Unstable Housing in the Last Year: No     Family History   Problem Relation Age of Onset    Diabetes Mother         Deseased    Heart Disease  Mother         4 blocked artieries    Lupus Mother     Cancer Father 44        Deseased, stomach? ww2  unclear hx    Heart Disease Brother     Clotting Disorder Brother     Colorectal Cancer Maternal Aunt     Cancer Maternal Aunt         colon    Diabetes Maternal Grandmother     Cancer Maternal Grandmother         breast    Breast Cancer Maternal Grandmother     No Known Problems Maternal Grandfather     No Known Problems Daughter     Heart Disease Son     Other Son         downs syndrome    Atrial fibrillation Son     Hypertension Son     Hyperlipidemia Neg Hx     Stroke Neg Hx     Ovarian Cancer Neg Hx     Tubal Cancer Neg Hx     Peritoneal Cancer Neg Hx        Chief Complaint   Patient presents with    Hyperlipidemia       ROS:   10 point review systems is otherwise negative except as per the HPI

## 2024-06-11 ENCOUNTER — APPOINTMENT (RX ONLY)
Dept: URBAN - METROPOLITAN AREA CLINIC 6 | Facility: CLINIC | Age: 64
Setting detail: DERMATOLOGY
End: 2024-06-11

## 2024-06-11 ENCOUNTER — PATIENT MESSAGE (OUTPATIENT)
Dept: CARDIOLOGY | Facility: MEDICAL CENTER | Age: 64
End: 2024-06-11
Payer: COMMERCIAL

## 2024-06-11 ENCOUNTER — HOSPITAL ENCOUNTER (OUTPATIENT)
Dept: LAB | Facility: MEDICAL CENTER | Age: 64
End: 2024-06-11
Attending: INTERNAL MEDICINE
Payer: COMMERCIAL

## 2024-06-11 DIAGNOSIS — D18.0 HEMANGIOMA: ICD-10-CM

## 2024-06-11 DIAGNOSIS — E78.49 OTHER HYPERLIPIDEMIA: ICD-10-CM

## 2024-06-11 DIAGNOSIS — Z71.89 OTHER SPECIFIED COUNSELING: ICD-10-CM

## 2024-06-11 DIAGNOSIS — E16.2 HYPOGLYCEMIA: ICD-10-CM

## 2024-06-11 DIAGNOSIS — D22 MELANOCYTIC NEVI: ICD-10-CM

## 2024-06-11 DIAGNOSIS — L81.4 OTHER MELANIN HYPERPIGMENTATION: ICD-10-CM

## 2024-06-11 DIAGNOSIS — L72.11 PILAR CYST: ICD-10-CM

## 2024-06-11 DIAGNOSIS — L73.8 OTHER SPECIFIED FOLLICULAR DISORDERS: ICD-10-CM

## 2024-06-11 DIAGNOSIS — L82.1 OTHER SEBORRHEIC KERATOSIS: ICD-10-CM

## 2024-06-11 PROBLEM — D22.62 MELANOCYTIC NEVI OF LEFT UPPER LIMB, INCLUDING SHOULDER: Status: ACTIVE | Noted: 2024-06-11

## 2024-06-11 PROBLEM — D22.71 MELANOCYTIC NEVI OF RIGHT LOWER LIMB, INCLUDING HIP: Status: ACTIVE | Noted: 2024-06-11

## 2024-06-11 PROBLEM — D23.72 OTHER BENIGN NEOPLASM OF SKIN OF LEFT LOWER LIMB, INCLUDING HIP: Status: ACTIVE | Noted: 2024-06-11

## 2024-06-11 PROBLEM — D22.5 MELANOCYTIC NEVI OF TRUNK: Status: ACTIVE | Noted: 2024-06-11

## 2024-06-11 PROBLEM — D18.01 HEMANGIOMA OF SKIN AND SUBCUTANEOUS TISSUE: Status: ACTIVE | Noted: 2024-06-11

## 2024-06-11 PROBLEM — D22.72 MELANOCYTIC NEVI OF LEFT LOWER LIMB, INCLUDING HIP: Status: ACTIVE | Noted: 2024-06-11

## 2024-06-11 PROBLEM — D22.61 MELANOCYTIC NEVI OF RIGHT UPPER LIMB, INCLUDING SHOULDER: Status: ACTIVE | Noted: 2024-06-11

## 2024-06-11 LAB
ALBUMIN SERPL BCP-MCNC: 4.2 G/DL (ref 3.2–4.9)
ALBUMIN/GLOB SERPL: 1.3 G/DL
ALP SERPL-CCNC: 79 U/L (ref 30–99)
ALT SERPL-CCNC: 24 U/L (ref 2–50)
ANION GAP SERPL CALC-SCNC: 13 MMOL/L (ref 7–16)
APPEARANCE UR: CLEAR
AST SERPL-CCNC: 21 U/L (ref 12–45)
BACTERIA #/AREA URNS HPF: ABNORMAL /HPF
BASOPHILS # BLD AUTO: 1.1 % (ref 0–1.8)
BASOPHILS # BLD: 0.05 K/UL (ref 0–0.12)
BILIRUB SERPL-MCNC: 0.4 MG/DL (ref 0.1–1.5)
BILIRUB UR QL STRIP.AUTO: NEGATIVE
BUN SERPL-MCNC: 15 MG/DL (ref 8–22)
CALCIUM ALBUM COR SERPL-MCNC: 8.5 MG/DL (ref 8.5–10.5)
CALCIUM SERPL-MCNC: 8.7 MG/DL (ref 8.5–10.5)
CHLORIDE SERPL-SCNC: 104 MMOL/L (ref 96–112)
CHOLEST SERPL-MCNC: 220 MG/DL (ref 100–199)
CO2 SERPL-SCNC: 23 MMOL/L (ref 20–33)
COLOR UR: YELLOW
CORTIS SERPL-MCNC: 14.1 UG/DL (ref 0–23)
CREAT SERPL-MCNC: 0.62 MG/DL (ref 0.5–1.4)
CRP SERPL HS-MCNC: 2.3 MG/L (ref 0–3)
EOSINOPHIL # BLD AUTO: 0.09 K/UL (ref 0–0.51)
EOSINOPHIL NFR BLD: 2 % (ref 0–6.9)
EPI CELLS #/AREA URNS HPF: ABNORMAL /HPF
ERYTHROCYTE [DISTWIDTH] IN BLOOD BY AUTOMATED COUNT: 43.9 FL (ref 35.9–50)
FASTING STATUS PATIENT QL REPORTED: NORMAL
GFR SERPLBLD CREATININE-BSD FMLA CKD-EPI: 99 ML/MIN/1.73 M 2
GLOBULIN SER CALC-MCNC: 3.2 G/DL (ref 1.9–3.5)
GLUCOSE SERPL-MCNC: 99 MG/DL (ref 65–99)
GLUCOSE UR STRIP.AUTO-MCNC: NEGATIVE MG/DL
HCT VFR BLD AUTO: 46.6 % (ref 37–47)
HDLC SERPL-MCNC: 56 MG/DL
HGB BLD-MCNC: 15.6 G/DL (ref 12–16)
HYALINE CASTS #/AREA URNS LPF: ABNORMAL /LPF
IMM GRANULOCYTES # BLD AUTO: 0.01 K/UL (ref 0–0.11)
IMM GRANULOCYTES NFR BLD AUTO: 0.2 % (ref 0–0.9)
KETONES UR STRIP.AUTO-MCNC: NEGATIVE MG/DL
LDLC SERPL CALC-MCNC: 149 MG/DL
LEUKOCYTE ESTERASE UR QL STRIP.AUTO: ABNORMAL
LYMPHOCYTES # BLD AUTO: 1.72 K/UL (ref 1–4.8)
LYMPHOCYTES NFR BLD: 37.4 % (ref 22–41)
MCH RBC QN AUTO: 30.1 PG (ref 27–33)
MCHC RBC AUTO-ENTMCNC: 33.5 G/DL (ref 32.2–35.5)
MCV RBC AUTO: 89.8 FL (ref 81.4–97.8)
MICRO URNS: ABNORMAL
MONOCYTES # BLD AUTO: 0.39 K/UL (ref 0–0.85)
MONOCYTES NFR BLD AUTO: 8.5 % (ref 0–13.4)
NEUTROPHILS # BLD AUTO: 2.34 K/UL (ref 1.82–7.42)
NEUTROPHILS NFR BLD: 50.8 % (ref 44–72)
NITRITE UR QL STRIP.AUTO: NEGATIVE
NRBC # BLD AUTO: 0 K/UL
NRBC BLD-RTO: 0 /100 WBC (ref 0–0.2)
PH UR STRIP.AUTO: 6 [PH] (ref 5–8)
PLATELET # BLD AUTO: 235 K/UL (ref 164–446)
PMV BLD AUTO: 11.5 FL (ref 9–12.9)
POTASSIUM SERPL-SCNC: 3.4 MMOL/L (ref 3.6–5.5)
PROT SERPL-MCNC: 7.4 G/DL (ref 6–8.2)
PROT UR QL STRIP: NEGATIVE MG/DL
RBC # BLD AUTO: 5.19 M/UL (ref 4.2–5.4)
RBC # URNS HPF: ABNORMAL /HPF
RBC UR QL AUTO: NEGATIVE
RENAL EPI CELLS #/AREA URNS HPF: ABNORMAL /HPF
SODIUM SERPL-SCNC: 140 MMOL/L (ref 135–145)
SP GR UR STRIP.AUTO: 1.02
TRIGL SERPL-MCNC: 77 MG/DL (ref 0–149)
TSH SERPL DL<=0.005 MIU/L-ACNC: 1.6 UIU/ML (ref 0.38–5.33)
UROBILINOGEN UR STRIP.AUTO-MCNC: 0.2 MG/DL
WBC # BLD AUTO: 4.6 K/UL (ref 4.8–10.8)
WBC #/AREA URNS HPF: ABNORMAL /HPF

## 2024-06-11 PROCEDURE — 99203 OFFICE O/P NEW LOW 30 MIN: CPT

## 2024-06-11 PROCEDURE — 83527 ASSAY OF INSULIN: CPT

## 2024-06-11 PROCEDURE — ? COUNSELING

## 2024-06-11 PROCEDURE — 85025 COMPLETE CBC W/AUTO DIFF WBC: CPT

## 2024-06-11 PROCEDURE — 84681 ASSAY OF C-PEPTIDE: CPT

## 2024-06-11 PROCEDURE — 80053 COMPREHEN METABOLIC PANEL: CPT

## 2024-06-11 PROCEDURE — 82533 TOTAL CORTISOL: CPT

## 2024-06-11 PROCEDURE — 80061 LIPID PANEL: CPT

## 2024-06-11 PROCEDURE — 83525 ASSAY OF INSULIN: CPT

## 2024-06-11 PROCEDURE — ? DEFER

## 2024-06-11 PROCEDURE — 36415 COLL VENOUS BLD VENIPUNCTURE: CPT

## 2024-06-11 PROCEDURE — 84443 ASSAY THYROID STIM HORMONE: CPT

## 2024-06-11 PROCEDURE — 81001 URINALYSIS AUTO W/SCOPE: CPT

## 2024-06-11 PROCEDURE — 83695 ASSAY OF LIPOPROTEIN(A): CPT

## 2024-06-11 PROCEDURE — 86141 C-REACTIVE PROTEIN HS: CPT

## 2024-06-11 ASSESSMENT — LOCATION DETAILED DESCRIPTION DERM
LOCATION DETAILED: RIGHT ANTERIOR PROXIMAL UPPER ARM
LOCATION DETAILED: RIGHT PROXIMAL PRETIBIAL REGION
LOCATION DETAILED: LOWER STERNUM
LOCATION DETAILED: RIGHT KNEE
LOCATION DETAILED: PERIUMBILICAL SKIN
LOCATION DETAILED: LEFT ANTERIOR DISTAL THIGH
LOCATION DETAILED: LEFT INFERIOR LATERAL FOREHEAD
LOCATION DETAILED: RIGHT ANTERIOR DISTAL THIGH
LOCATION DETAILED: RIGHT ANTERIOR DISTAL UPPER ARM
LOCATION DETAILED: LEFT VENTRAL PROXIMAL FOREARM
LOCATION DETAILED: LEFT ANTERIOR DISTAL UPPER ARM
LOCATION DETAILED: RIGHT VENTRAL PROXIMAL FOREARM
LOCATION DETAILED: LEFT SUPERIOR PARIETAL SCALP
LOCATION DETAILED: RIGHT ANTECUBITAL SKIN
LOCATION DETAILED: EPIGASTRIC SKIN
LOCATION DETAILED: RIGHT LATERAL FOREHEAD
LOCATION DETAILED: LEFT PROXIMAL PRETIBIAL REGION
LOCATION DETAILED: LEFT ANTERIOR PROXIMAL UPPER ARM
LOCATION DETAILED: LEFT KNEE

## 2024-06-11 ASSESSMENT — LOCATION SIMPLE DESCRIPTION DERM
LOCATION SIMPLE: LEFT UPPER ARM
LOCATION SIMPLE: RIGHT PRETIBIAL REGION
LOCATION SIMPLE: RIGHT KNEE
LOCATION SIMPLE: LEFT FOREARM
LOCATION SIMPLE: RIGHT FOREHEAD
LOCATION SIMPLE: LEFT FOREHEAD
LOCATION SIMPLE: RIGHT UPPER ARM
LOCATION SIMPLE: RIGHT THIGH
LOCATION SIMPLE: SCALP
LOCATION SIMPLE: RIGHT FOREARM
LOCATION SIMPLE: LEFT PRETIBIAL REGION
LOCATION SIMPLE: LEFT KNEE
LOCATION SIMPLE: LEFT THIGH
LOCATION SIMPLE: ABDOMEN
LOCATION SIMPLE: CHEST

## 2024-06-11 ASSESSMENT — LOCATION ZONE DERM
LOCATION ZONE: TRUNK
LOCATION ZONE: FACE
LOCATION ZONE: LEG
LOCATION ZONE: SCALP
LOCATION ZONE: ARM

## 2024-06-11 NOTE — PROCEDURE: DEFER
Size Of Lesion In Cm (Optional): 1
Detail Level: Detailed
Introduction Text (Please End With A Colon): The following procedure was deferred: excision

## 2024-06-13 LAB
INSULIN FREE SERPL-ACNC: 5 UIU/ML (ref 3–25)
INSULIN SERPL-ACNC: 6 UIU/ML (ref 3–25)

## 2024-06-14 LAB
C PEPTIDE SERPL-MCNC: 1.7 NG/ML (ref 0.5–3.3)
LPA SERPL-MCNC: 69 MG/DL

## 2024-06-20 ENCOUNTER — OFFICE VISIT (OUTPATIENT)
Dept: MEDICAL GROUP | Facility: IMAGING CENTER | Age: 64
End: 2024-06-20
Payer: COMMERCIAL

## 2024-06-20 VITALS
TEMPERATURE: 98.2 F | BODY MASS INDEX: 23.72 KG/M2 | DIASTOLIC BLOOD PRESSURE: 68 MMHG | OXYGEN SATURATION: 95 % | HEART RATE: 71 BPM | SYSTOLIC BLOOD PRESSURE: 112 MMHG | WEIGHT: 147.6 LBS | HEIGHT: 66 IN | RESPIRATION RATE: 16 BRPM

## 2024-06-20 DIAGNOSIS — E87.6 HYPOKALEMIA: ICD-10-CM

## 2024-06-20 DIAGNOSIS — E78.00 PURE HYPERCHOLESTEROLEMIA: ICD-10-CM

## 2024-06-20 PROCEDURE — 99213 OFFICE O/P EST LOW 20 MIN: CPT | Performed by: INTERNAL MEDICINE

## 2024-06-20 PROCEDURE — 3074F SYST BP LT 130 MM HG: CPT | Performed by: INTERNAL MEDICINE

## 2024-06-20 PROCEDURE — 3078F DIAST BP <80 MM HG: CPT | Performed by: INTERNAL MEDICINE

## 2024-06-20 ASSESSMENT — FIBROSIS 4 INDEX: FIB4 SCORE: 1.19

## 2024-06-20 NOTE — PROGRESS NOTES
"Established Patient    Madhavi Baires is a 64 y.o. female who presents today with the following:    CC:   Chief Complaint   Patient presents with    Lab Results     Follow up labs  Worried about Cholesterol       HPI:       History of Present Illness      Problem   Hypokalemia    She is consuming potassium rich foods     Pure Hypercholesterolemia    Elevated LDL, elevated lipoprotein A  Healthful lifestyle measures  Following with cardiology  Pending CT calcium score  The 10-year ASCVD risk score (Rafiq CAN, et al., 2019) is: 4.5%    Values used to calculate the score:      Age: 64 years      Sex: Female      Is Non- : No      Diabetic: No      Tobacco smoker: No      Systolic Blood Pressure: 112 mmHg      Is BP treated: No      HDL Cholesterol: 56 mg/dL      Total Cholesterol: 220 mg/dL            Current Outpatient Medications   Medication Sig    fluticasone (FLONASE) 50 MCG/ACT nasal spray Administer 1 Spray into affected nostril(S) every day.    multivitamin Tab Take 1 Tablet by mouth every day.    Omega-3 Fatty Acids (FISH OIL) 1000 MG Cap capsule Take 1,000 mg by mouth 3 times a day with meals.     Allergies   Allergen Reactions    Azithromycin Rash     Z-PACK       Allergies, past medical history, past surgical history, medications, family history, social history reviewed and updated.    ROS Please see HPI    Physical Exam  Vitals: /68 (BP Location: Left arm, Patient Position: Sitting, BP Cuff Size: Adult)   Pulse 71   Temp 36.8 °C (98.2 °F) (Temporal)   Resp 16   Ht 1.676 m (5' 6\")   Wt 67 kg (147 lb 9.6 oz)   LMP 03/09/2015   SpO2 95%   BMI 23.82 kg/m²   Physical Exam  Constitutional:       Appearance: Normal appearance.   HENT:      Head: Normocephalic and atraumatic.      Right Ear: External ear normal.      Left Ear: External ear normal.      Nose: Nose normal.      Mouth/Throat:      Pharynx: Oropharynx is clear.   Cardiovascular:      Rate and Rhythm: " Normal rate and regular rhythm.      Pulses: Normal pulses.   Pulmonary:      Effort: Pulmonary effort is normal.      Breath sounds: Normal breath sounds.   Abdominal:      General: Bowel sounds are normal.      Palpations: Abdomen is soft.      Tenderness: There is no abdominal tenderness.   Musculoskeletal:      Cervical back: Neck supple.      Right lower leg: No edema.      Left lower leg: No edema.   Skin:     General: Skin is warm and dry.   Neurological:      Mental Status: She is alert. Mental status is at baseline.   Psychiatric:         Mood and Affect: Mood normal.         Behavior: Behavior normal.         Thought Content: Thought content normal.         Judgment: Judgment normal.         Labs (6/11/24) were reviewed and discussed with patients.    All questions were answered.      Assessment and Plan    1. Pure hypercholesterolemia  Elevated LDL, elevated lipoprotein A  Healthful lifestyle measures  Following with cardiology  Pending CT calcium score  The 10-year ASCVD risk score (Rafiq CAN, et al., 2019) is: 4.5%    Values used to calculate the score:      Age: 64 years      Sex: Female      Is Non- : No      Diabetic: No      Tobacco smoker: No      Systolic Blood Pressure: 112 mmHg      Is BP treated: No      HDL Cholesterol: 56 mg/dL      Total Cholesterol: 220 mg/dL    2. Hypokalemia  She is consuming potassium rich foods  She has a follow up CMP by cardiology        Follow-up:Return in about 2 months (around 8/20/2024), or if symptoms worsen or fail to improve.    This note was created using voice recognition software. There may be unintended errors in spelling, grammar or content.

## 2024-06-20 NOTE — ASSESSMENT & PLAN NOTE
Elevated LDL, elevated lipoprotein A  Healthful lifestyle measures  Following with cardiology  Pending CT calcium score  The 10-year ASCVD risk score (Rafiq CAN, et al., 2019) is: 4.5%    Values used to calculate the score:      Age: 64 years      Sex: Female      Is Non- : No      Diabetic: No      Tobacco smoker: No      Systolic Blood Pressure: 112 mmHg      Is BP treated: No      HDL Cholesterol: 56 mg/dL      Total Cholesterol: 220 mg/dL

## 2024-06-27 ENCOUNTER — HOSPITAL ENCOUNTER (OUTPATIENT)
Dept: RADIOLOGY | Facility: MEDICAL CENTER | Age: 64
End: 2024-06-27
Attending: INTERNAL MEDICINE
Payer: COMMERCIAL

## 2024-06-27 DIAGNOSIS — E78.49 OTHER HYPERLIPIDEMIA: ICD-10-CM

## 2024-06-27 PROCEDURE — 4410556 CT-CARDIAC SCORING (SELF PAY ONLY)

## 2024-07-24 ENCOUNTER — HOSPITAL ENCOUNTER (OUTPATIENT)
Dept: RADIOLOGY | Facility: MEDICAL CENTER | Age: 64
End: 2024-07-24
Attending: INTERNAL MEDICINE
Payer: COMMERCIAL

## 2024-07-24 DIAGNOSIS — Z78.0 MENOPAUSE: ICD-10-CM

## 2024-07-24 PROCEDURE — 77080 DXA BONE DENSITY AXIAL: CPT

## 2024-09-03 ENCOUNTER — APPOINTMENT (OUTPATIENT)
Dept: MEDICAL GROUP | Facility: IMAGING CENTER | Age: 64
End: 2024-09-03
Payer: COMMERCIAL

## 2024-09-03 VITALS
OXYGEN SATURATION: 96 % | WEIGHT: 142 LBS | TEMPERATURE: 98.6 F | DIASTOLIC BLOOD PRESSURE: 70 MMHG | SYSTOLIC BLOOD PRESSURE: 116 MMHG | HEART RATE: 66 BPM | HEIGHT: 66 IN | BODY MASS INDEX: 22.82 KG/M2

## 2024-09-03 DIAGNOSIS — E78.00 PURE HYPERCHOLESTEROLEMIA: ICD-10-CM

## 2024-09-03 DIAGNOSIS — M85.89 OSTEOPENIA OF MULTIPLE SITES: ICD-10-CM

## 2024-09-03 DIAGNOSIS — M79.18 BUTTOCK PAIN: ICD-10-CM

## 2024-09-03 DIAGNOSIS — R19.5 LOOSE STOOLS: ICD-10-CM

## 2024-09-03 PROCEDURE — 99214 OFFICE O/P EST MOD 30 MIN: CPT | Performed by: INTERNAL MEDICINE

## 2024-09-03 PROCEDURE — 3074F SYST BP LT 130 MM HG: CPT | Performed by: INTERNAL MEDICINE

## 2024-09-03 PROCEDURE — 3078F DIAST BP <80 MM HG: CPT | Performed by: INTERNAL MEDICINE

## 2024-09-03 ASSESSMENT — FIBROSIS 4 INDEX: FIB4 SCORE: 1.19

## 2024-09-03 NOTE — PROGRESS NOTES
Established Patient    Madhavi Baires is a 64 y.o. female who presents today with the following:    CC:   Chief Complaint   Patient presents with    Follow-Up     Dexa Scan results       HPI:       History of Present Illness      Problem   Osteopenia of Multiple Sites    7/24/24 DEXA  According to the World Health Organization classification, bone mineral density of this patient is osteopenic for both lumbar spine and proximal right femur.     10-year Probability of Fracture:  Major Osteoporotic     19.5%  Hip     3.9%    calcium 1200 mg daily from all sources, vitamin D supplement  Weight bearing exercises  She does not want pharmacotherapy at the moment     Buttock Pain    Right upper buttock, right lower lateral back pain for several days  Hx of similar pain many years ago (20 year)  Worse with running/bike too much     PT  Ice for swelling or heat for stiffness prn  Good posture  X-ray if no improvement     Pure Hypercholesterolemia    Elevated LDL, elevated lipoprotein A  Healthful lifestyle measures  6/27/24 CT calcium score: 0  She does not want pharmacotherapy.  She is taking Red Yeast Rice from [employer]  The 10-year ASCVD risk score (Rafiq CAN, et al., 2019) is: 4.8%    Values used to calculate the score:      Age: 64 years      Sex: Female      Is Non- : No      Diabetic: No      Tobacco smoker: No      Systolic Blood Pressure: 116 mmHg      Is BP treated: No      HDL Cholesterol: 56 mg/dL      Total Cholesterol: 220 mg/dL       Loose Stools    This is chronically condition for past 6 months or more, more frequently since ~3/2023 ( Every other week)   Diarrhea episodes: twice a week  Fever: Denies  Recent abx: Denies  Recent travel: Denies travel outside [place]  Stool: no oil on toilet    CBC, CMP unremarkable. Obtain stool testing. Patient has fu with GI. Journal and fodmap           Current Outpatient Medications   Medication Sig    fluticasone (FLONASE) 50 MCG/ACT nasal  "spray Administer 1 Spray into affected nostril(S) every day.    multivitamin Tab Take 1 Tablet by mouth every day.    Omega-3 Fatty Acids (FISH OIL) 1000 MG Cap capsule Take 1,000 mg by mouth 3 times a day with meals.     Allergies   Allergen Reactions    Azithromycin Rash     Z-PACK       Allergies, past medical history, past surgical history, medications, family history, social history reviewed and updated.    ROS Please see HPI    Physical Exam  Vitals: /70 (BP Location: Right arm, Patient Position: Sitting, BP Cuff Size: Adult)   Pulse 66   Temp 37 °C (98.6 °F) (Temporal)   Ht 1.676 m (5' 6\")   Wt 64.4 kg (142 lb)   LMP 03/09/2015   SpO2 96%   BMI 22.92 kg/m²   Physical Exam  Constitutional:       Appearance: Normal appearance.   HENT:      Head: Normocephalic and atraumatic.      Right Ear: External ear normal.      Left Ear: External ear normal.      Nose: Nose normal.      Mouth/Throat:      Pharynx: Oropharynx is clear.   Cardiovascular:      Rate and Rhythm: Normal rate and regular rhythm.      Pulses: Normal pulses.   Pulmonary:      Effort: Pulmonary effort is normal.      Breath sounds: Normal breath sounds.   Abdominal:      General: Bowel sounds are normal.      Palpations: Abdomen is soft.      Tenderness: There is no abdominal tenderness.   Musculoskeletal:      Cervical back: Neck supple.      Right lower leg: No edema.      Left lower leg: No edema.   Skin:     General: Skin is warm and dry.   Neurological:      Mental Status: She is alert. Mental status is at baseline.   Psychiatric:         Mood and Affect: Mood normal.         Behavior: Behavior normal.         Thought Content: Thought content normal.         Judgment: Judgment normal.           Imaging (7/24/24) was reviewed.  All questions were answered.      Assessment and Plan    1. Osteopenia of multiple sites  7/24/24 DEXA  According to the World Health Organization classification, bone mineral density of this patient is " osteopenic for both lumbar spine and proximal right femur.     10-year Probability of Fracture:  Major Osteoporotic     19.5%  Hip     3.9%    calcium 1200 mg daily from all sources, vitamin D supplement  Weight bearing exercises  She does not want pharmacotherapy at the moment    2. Buttock pain  - Referral to Physical Therapy  - DX-LUMBAR SPINE-4+ VIEWS; Future  - DX-HIP-COMPLETE - UNILATERAL 2+ RIGHT; Future    Right upper buttock, right lower lateral back pain for several days  Hx of similar pain many years ago (20 year)  Worse with running/bike too much     PT  Ice for swelling or heat for stiffness prn  Good posture  X-ray if no improvement    3. Loose stools  - CELIAC DISEASE AB PANEL; Future  - CALPROTECTIN,FECAL; Future  - OCCULT BLOOD FECES IMMUNOASSAY; Future  - Sed Rate; Future  - Ova & Parasite Exam, Fecal; Future  - CULTURE STOOL; Future    4. Pure hypercholesterolemia  Elevated LDL, elevated lipoprotein A  Healthful lifestyle measures  6/27/24 CT calcium score: 0  She does not want pharmacotherapy.  She is taking Red Yeast Rice from [employer]  The 10-year ASCVD risk score (Rafiq CAN, et al., 2019) is: 4.8%    Values used to calculate the score:      Age: 64 years      Sex: Female      Is Non- : No      Diabetic: No      Tobacco smoker: No      Systolic Blood Pressure: 116 mmHg      Is BP treated: No      HDL Cholesterol: 56 mg/dL      Total Cholesterol: 220 mg/dL      My total time spent caring for the patient on the day of the encounter was 33 minutes.   This does not include time spent on separately billable procedures/tests.      Follow-up:Return in about 3 months (around 12/3/2024), or if symptoms worsen or fail to improve.    This note was created using voice recognition software. There may be unintended errors in spelling, grammar or content.

## 2024-09-03 NOTE — ASSESSMENT & PLAN NOTE
Elevated LDL, elevated lipoprotein A  Healthful lifestyle measures  6/27/24 CT calcium score: 0  She does not want pharmacotherapy.  She is taking Red Yeast Rice from [employer]  The 10-year ASCVD risk score (Rafiq CAN, et al., 2019) is: 4.8%    Values used to calculate the score:      Age: 64 years      Sex: Female      Is Non- : No      Diabetic: No      Tobacco smoker: No      Systolic Blood Pressure: 116 mmHg      Is BP treated: No      HDL Cholesterol: 56 mg/dL      Total Cholesterol: 220 mg/dL

## 2024-09-03 NOTE — ASSESSMENT & PLAN NOTE
Right upper buttock, right lower lateral back pain for several days  Hx of similar pain many years ago (20 year)  Worse with running/bike too much     PT  Ice for swelling or heat for stiffness prn  Good posture  X-ray if no improvement

## 2024-09-03 NOTE — ASSESSMENT & PLAN NOTE
This is chronically condition for past 6 months or more, more frequently since ~3/2023 ( Every other week)   Diarrhea episodes: twice a week  Fever: Denies  Recent abx: Denies  Recent travel: Denies travel outside [place]  Stool: no oil on toilet    CBC, CMP unremarkable. Obtain stool testing. Patient has fu with GI. Journal and fodmap

## 2024-09-03 NOTE — ASSESSMENT & PLAN NOTE
7/24/24 DEXA  According to the World Health Organization classification, bone mineral density of this patient is osteopenic for both lumbar spine and proximal right femur.     10-year Probability of Fracture:  Major Osteoporotic     19.5%  Hip     3.9%    calcium 1200 mg daily from all sources, vitamin D supplement  Weight bearing exercises  She does not want pharmacotherapy at the moment

## 2024-09-10 ENCOUNTER — APPOINTMENT (OUTPATIENT)
Dept: PHYSICAL THERAPY | Facility: REHABILITATION | Age: 64
End: 2024-09-10
Attending: INTERNAL MEDICINE
Payer: COMMERCIAL

## 2024-09-17 ENCOUNTER — APPOINTMENT (OUTPATIENT)
Dept: PHYSICAL THERAPY | Facility: REHABILITATION | Age: 64
End: 2024-09-17
Attending: INTERNAL MEDICINE
Payer: COMMERCIAL

## 2024-09-25 ENCOUNTER — APPOINTMENT (OUTPATIENT)
Dept: PHYSICAL THERAPY | Facility: REHABILITATION | Age: 64
End: 2024-09-25
Attending: INTERNAL MEDICINE
Payer: COMMERCIAL

## 2024-10-02 ENCOUNTER — APPOINTMENT (OUTPATIENT)
Dept: PHYSICAL THERAPY | Facility: REHABILITATION | Age: 64
End: 2024-10-02
Attending: INTERNAL MEDICINE
Payer: COMMERCIAL

## 2024-10-07 DIAGNOSIS — K42.9 UMBILICAL HERNIA WITHOUT OBSTRUCTION AND WITHOUT GANGRENE: ICD-10-CM

## 2024-10-10 ENCOUNTER — APPOINTMENT (OUTPATIENT)
Dept: PHYSICAL THERAPY | Facility: REHABILITATION | Age: 64
End: 2024-10-10
Attending: INTERNAL MEDICINE
Payer: COMMERCIAL

## 2024-10-17 ENCOUNTER — APPOINTMENT (OUTPATIENT)
Dept: PHYSICAL THERAPY | Facility: REHABILITATION | Age: 64
End: 2024-10-17
Attending: INTERNAL MEDICINE
Payer: COMMERCIAL

## 2024-11-07 ENCOUNTER — APPOINTMENT (OUTPATIENT)
Dept: PHYSICAL THERAPY | Facility: REHABILITATION | Age: 64
End: 2024-11-07
Attending: INTERNAL MEDICINE
Payer: COMMERCIAL

## 2024-11-07 DIAGNOSIS — E78.00 PURE HYPERCHOLESTEROLEMIA: ICD-10-CM

## 2024-11-07 DIAGNOSIS — Z12.11 SCREENING FOR COLON CANCER: ICD-10-CM

## 2024-11-07 NOTE — PROGRESS NOTES
Screening for colon cancer  -     Cologuard® colon cancer screening    Pure hypercholesterolemia  -     Lipid Profile; Future

## 2024-11-13 ENCOUNTER — APPOINTMENT (OUTPATIENT)
Dept: PHYSICAL THERAPY | Facility: REHABILITATION | Age: 64
End: 2024-11-13
Attending: INTERNAL MEDICINE
Payer: COMMERCIAL

## 2024-11-20 ENCOUNTER — APPOINTMENT (OUTPATIENT)
Dept: PHYSICAL THERAPY | Facility: REHABILITATION | Age: 64
End: 2024-11-20
Attending: INTERNAL MEDICINE
Payer: COMMERCIAL

## 2024-12-05 ENCOUNTER — APPOINTMENT (OUTPATIENT)
Dept: PHYSICAL THERAPY | Facility: REHABILITATION | Age: 64
End: 2024-12-05
Attending: INTERNAL MEDICINE
Payer: COMMERCIAL

## 2024-12-06 ENCOUNTER — APPOINTMENT (OUTPATIENT)
Dept: MEDICAL GROUP | Facility: IMAGING CENTER | Age: 64
End: 2024-12-06
Payer: COMMERCIAL

## 2025-01-09 ENCOUNTER — APPOINTMENT (OUTPATIENT)
Dept: RADIOLOGY | Facility: MEDICAL CENTER | Age: 65
End: 2025-01-09
Attending: INTERNAL MEDICINE
Payer: COMMERCIAL

## 2025-01-09 DIAGNOSIS — Z12.31 VISIT FOR SCREENING MAMMOGRAM: ICD-10-CM

## 2025-02-26 ENCOUNTER — APPOINTMENT (OUTPATIENT)
Dept: SURGICAL ONCOLOGY | Facility: MEDICAL CENTER | Age: 65
End: 2025-02-26
Payer: COMMERCIAL

## 2025-02-26 VITALS
DIASTOLIC BLOOD PRESSURE: 68 MMHG | TEMPERATURE: 98 F | WEIGHT: 133.6 LBS | HEIGHT: 66 IN | HEART RATE: 85 BPM | OXYGEN SATURATION: 95 % | SYSTOLIC BLOOD PRESSURE: 112 MMHG | BODY MASS INDEX: 21.47 KG/M2

## 2025-02-26 DIAGNOSIS — K42.9 UMBILICAL HERNIA WITHOUT OBSTRUCTION AND WITHOUT GANGRENE: ICD-10-CM

## 2025-02-26 ASSESSMENT — FIBROSIS 4 INDEX: FIB4 SCORE: 1.19

## 2025-02-26 NOTE — PROGRESS NOTES
DEPARTMENT OF SURGERY  BARIATRIC AND GASTROESOPHAGEAL SURGERY       DATE OF CLINIC VISIT:  2/26/2025      CHIEF COMPLAINT:  Symptomatic abdominal wall bulge, associated with pain/discomfort    HISTORY OF PRESENT ILLNESS:  We had the pleasure of seeing Madhavi Baires, a 64 y.o. female, for evaluation of a moderate umbilical bulge, associated with pain/discomfort. This consultation was requested by Dr. Flores. Patient reports that her symptoms are more prominent when lifting, coughing, performing strenuous activities, and getting up from standing. This bulge is reducible, getting larger, and is limiting exercise and some activities of daily living. No history of intestinal obstruction (bloating/distention, nausea/vomiting, worsening abdominal pain, or obstipation) or any episodes of this bulge being irreducible. Initial onset of symptoms started more than 10 years ago. No other abdominal or inguinal bulges and no other personal or family history of hernias. Pertinent imaging includes: NO Prior imaging has been done. Risk factors for hernia surgery: diabetes - NO, current smoker/tobacco user - NO, obesity - NO (Body mass index is 21.56 kg/m².).    Allergies   Allergen Reactions    Azithromycin Rash     Z-PACK       Past Medical History:   Diagnosis Date    Allergic rhinitis 03/05/2013    Allergic rhinitis     Arthritis     Chronic rhinitis 03/05/2013    Indigestion     Other hyperlipidemia 4/22/2024      Past Surgical History:   Procedure Laterality Date    LUMPECTOMY      OTHER  2004?    breast implants    OTHER      foregin body removal on toe    MA BREAST AUGMENTATION WITH IMPLANT Bilateral     TONSILLECTOMY       Family History   Problem Relation Age of Onset    Diabetes Mother         Deseased    Heart Disease Mother         4 blocked artieries    Lupus Mother     Cancer Father 44        Deseased, stomach? ww2  unclear hx    Heart Disease Brother     Clotting Disorder Brother     Colorectal  Cancer Maternal Aunt     Cancer Maternal Aunt         colon    Diabetes Maternal Grandmother     Cancer Maternal Grandmother         breast    Breast Cancer Maternal Grandmother     No Known Problems Maternal Grandfather     No Known Problems Daughter     Heart Disease Son     Other Son         downs syndrome    Atrial fibrillation Son     Hypertension Son     Hyperlipidemia Neg Hx     Stroke Neg Hx     Ovarian Cancer Neg Hx     Tubal Cancer Neg Hx     Peritoneal Cancer Neg Hx      Social History     Socioeconomic History    Marital status:      Spouse name: Not on file    Number of children: Not on file    Years of education: Not on file    Highest education level: Bachelor's degree (e.g., BA, AB, BS)   Occupational History    Not on file   Tobacco Use    Smoking status: Never    Smokeless tobacco: Never   Vaping Use    Vaping status: Never Used   Substance and Sexual Activity    Alcohol use: No    Drug use: No    Sexual activity: Not Currently     Birth control/protection: Post-Menopausal     Comment:  vasectomy   Other Topics Concern    Not on file   Social History Narrative    Not on file     Social Drivers of Health     Financial Resource Strain: Medium Risk (5/21/2023)    Overall Financial Resource Strain (CARDIA)     Difficulty of Paying Living Expenses: Somewhat hard   Food Insecurity: No Food Insecurity (5/21/2023)    Hunger Vital Sign     Worried About Running Out of Food in the Last Year: Never true     Ran Out of Food in the Last Year: Never true   Transportation Needs: No Transportation Needs (5/21/2023)    PRAPARE - Transportation     Lack of Transportation (Medical): No     Lack of Transportation (Non-Medical): No   Physical Activity: Insufficiently Active (5/21/2023)    Exercise Vital Sign     Days of Exercise per Week: 3 days     Minutes of Exercise per Session: 30 min   Stress: Stress Concern Present (5/21/2023)    Citizen of [place] of Occupational Health - Occupational Stress  "Questionnaire     Feeling of Stress : To some extent   Social Connections: Socially Isolated (5/21/2023)    Social Connection and Isolation Panel [NHANES]     Frequency of Communication with Friends and Family: More than three times a week     Frequency of Social Gatherings with Friends and Family: Never     Attends Buddhism Services: Never     Active Member of Clubs or Organizations: No     Attends Club or Organization Meetings: Never     Marital Status:    Intimate Partner Violence: Not on file   Housing Stability: Low Risk  (5/21/2023)    Housing Stability Vital Sign     Unable to Pay for Housing in the Last Year: No     Number of Places Lived in the Last Year: 1     Unstable Housing in the Last Year: No       Current Outpatient Medications   Medication Sig Dispense Refill    fluticasone (FLONASE) 50 MCG/ACT nasal spray Administer 1 Spray into affected nostril(S) every day.      multivitamin Tab Take 1 Tablet by mouth every day.      Omega-3 Fatty Acids (FISH OIL) 1000 MG Cap capsule Take 1,000 mg by mouth 3 times a day with meals.       No current facility-administered medications for this visit.      REVIEW OF SYSTEMS:   Review of systems (+10 systems) unremarkable except for concerns noted by patient or items listed. Please see HPI for additional ROS.    PHYSICAL EXAM:  /68 (BP Location: Right arm, Patient Position: Sitting, BP Cuff Size: Adult)   Pulse 85   Temp 36.7 °C (98 °F) (Temporal)   Ht 1.676 m (5' 6\")   Wt 60.6 kg (133 lb 9.6 oz)   LMP 03/09/2015   SpO2 95%   BMI 21.56 kg/m²     Wt Readings from Last 1 Encounters:   02/26/25 60.6 kg (133 lb 9.6 oz)     Constitutional:  well developed, well nourished, in no apparent distress, alert and oriented x 3  Eyes:   sclera is anicteric, PERRLA  Head:   normocephalic, atraumatic, no scalp lesions  Mouth/Throat:  normal, moist mucous membranes  Neck:   supple, no JVD, no tracheal deviation  Chest:   good respiratory effort, no accessory " muscle use  Cardiac:  normal rate, regular rhythm   Abdomen:  + moderate, reducible, supra-umbilical bulge, with fascial defect measuring ~ 3 cm in diameter, + min tenderness to palpation, no overlying skin changes, no associated distention, no rebound/guarding, no TTP elsewhere, no other bulges or masses  Groin:     R - no bulges/masses, no palpable cough impulse, no overlying skin changes, no TTP     L - no bulges/masses, no palpable cough impulse, no overlying skin changes, no TTP  Extremities:  normal strength, tone, and muscle mass; no clubbing, cyanosis, or edema  bilaterally  Neurologic:  normal without focal findings, cognition is intact, CN II - XII are grossly intact  Skin:   no jaundice, no rashes or significant lesions, no bruising  Psych:   good judgement, mood and affect are appropriate, excellent hygiene    DATA REVIEW:    None    ASSESSMENT AND PLAN:  Ms. Madhavi Baires is a 64 y.o. female, who complains of a moderate-size, umbilical bulge, associated with pain/discomfort, and is found to have a reducible, umbilical hernia (~ 3 cm) on clinical exam. I have discussed robotic, laparoscopic, and open umbilical / abdominal wall hernia repair procedures, possibly with synthetic mesh reinforcement.     We discussed the risks of repair and the risk it will enlarge, worsen, or incarcerate without repair. In addition, we discussed alternatives, risks and disability in the betty-operative setting. Specifically, discussed were the risks of bleeding, infection, pain, fistulization, bowel or other organ injury, complications due to mesh and chances of recurrence. Questions were answered in full and patient wishes to proceed. Appropriate educational material was presented to the patient. Robotic, possible laparoscopic, hernia repair with possible mesh placement will be scheduled at her earliest convenience.     Thank you for giving us the opportunity to care for your patient.      Sincerely,    Ino  FREDDY Madera MD MPH FACS [facility]  Bariatric and Gastroesophageal Surgery  Department of Surgery, [facility]  Clinical Assistant Professor of Surgery  [facility]    2/26/2025

## 2025-03-04 ENCOUNTER — APPOINTMENT (OUTPATIENT)
Dept: RADIOLOGY | Facility: MEDICAL CENTER | Age: 65
End: 2025-03-04
Attending: INTERNAL MEDICINE
Payer: COMMERCIAL

## 2025-03-04 DIAGNOSIS — Z12.31 VISIT FOR SCREENING MAMMOGRAM: ICD-10-CM

## 2025-04-04 ENCOUNTER — APPOINTMENT (OUTPATIENT)
Dept: RADIOLOGY | Facility: MEDICAL CENTER | Age: 65
End: 2025-04-04
Attending: INTERNAL MEDICINE
Payer: COMMERCIAL

## 2025-04-14 ENCOUNTER — APPOINTMENT (OUTPATIENT)
Dept: LAB | Facility: MEDICAL CENTER | Age: 65
End: 2025-04-14
Payer: COMMERCIAL

## 2025-04-28 ENCOUNTER — APPOINTMENT (OUTPATIENT)
Dept: LAB | Facility: MEDICAL CENTER | Age: 65
End: 2025-04-28
Payer: COMMERCIAL

## 2025-05-02 ENCOUNTER — APPOINTMENT (OUTPATIENT)
Dept: MEDICAL GROUP | Facility: IMAGING CENTER | Age: 65
End: 2025-05-02
Payer: COMMERCIAL

## 2025-05-02 VITALS
DIASTOLIC BLOOD PRESSURE: 74 MMHG | BODY MASS INDEX: 21.41 KG/M2 | TEMPERATURE: 99.2 F | WEIGHT: 133.2 LBS | SYSTOLIC BLOOD PRESSURE: 128 MMHG | HEART RATE: 57 BPM | HEIGHT: 66 IN | RESPIRATION RATE: 16 BRPM | OXYGEN SATURATION: 97 %

## 2025-05-02 DIAGNOSIS — Z00.00 WELLNESS EXAMINATION: ICD-10-CM

## 2025-05-02 DIAGNOSIS — E78.00 PURE HYPERCHOLESTEROLEMIA: ICD-10-CM

## 2025-05-02 DIAGNOSIS — E87.6 HYPOKALEMIA: ICD-10-CM

## 2025-05-02 DIAGNOSIS — F43.9 STRESS: ICD-10-CM

## 2025-05-02 DIAGNOSIS — R79.89 ABNORMAL CBC: ICD-10-CM

## 2025-05-02 DIAGNOSIS — J01.90 ACUTE NON-RECURRENT SINUSITIS, UNSPECIFIED LOCATION: ICD-10-CM

## 2025-05-02 DIAGNOSIS — F41.9 ANXIETY: ICD-10-CM

## 2025-05-02 PROCEDURE — RXMED WILLOW AMBULATORY MEDICATION CHARGE: Performed by: INTERNAL MEDICINE

## 2025-05-02 PROCEDURE — 99214 OFFICE O/P EST MOD 30 MIN: CPT | Performed by: INTERNAL MEDICINE

## 2025-05-02 PROCEDURE — 3078F DIAST BP <80 MM HG: CPT | Performed by: INTERNAL MEDICINE

## 2025-05-02 PROCEDURE — 3074F SYST BP LT 130 MM HG: CPT | Performed by: INTERNAL MEDICINE

## 2025-05-02 ASSESSMENT — ANXIETY QUESTIONNAIRES
GAD7 TOTAL SCORE: 11
5. BEING SO RESTLESS THAT IT IS HARD TO SIT STILL: NOT AT ALL
2. NOT BEING ABLE TO STOP OR CONTROL WORRYING: NEARLY EVERY DAY
1. FEELING NERVOUS, ANXIOUS, OR ON EDGE: MORE THAN HALF THE DAYS
7. FEELING AFRAID AS IF SOMETHING AWFUL MIGHT HAPPEN: SEVERAL DAYS
IF YOU CHECKED OFF ANY PROBLEMS ON THIS QUESTIONNAIRE, HOW DIFFICULT HAVE THESE PROBLEMS MADE IT FOR YOU TO DO YOUR WORK, TAKE CARE OF THINGS AT HOME, OR GET ALONG WITH OTHER PEOPLE: SOMEWHAT DIFFICULT
6. BECOMING EASILY ANNOYED OR IRRITABLE: SEVERAL DAYS
3. WORRYING TOO MUCH ABOUT DIFFERENT THINGS: NEARLY EVERY DAY
4. TROUBLE RELAXING: SEVERAL DAYS

## 2025-05-02 ASSESSMENT — PATIENT HEALTH QUESTIONNAIRE - PHQ9: CLINICAL INTERPRETATION OF PHQ2 SCORE: 0

## 2025-05-02 ASSESSMENT — FIBROSIS 4 INDEX: FIB4 SCORE: 1.19

## 2025-05-02 NOTE — PROGRESS NOTES
Established Patient    Madhavi Baires is a 64 y.o. female who presents today with the following:    CC:   Chief Complaint   Patient presents with    Sinus Problem     Sinus pressure, fuzzy feeling, x4 days     Paperwork     [place], [place] not have paperwork       HPI:     Verbal consent was acquired by the patient to use [employer] ambient listening note generation during this visit Yes     History of Present Illness  The patient presents for evaluation of anxiety and sinus issues.    Anxiety  Moderate anxiety attributed to situational stressors related to her supervisor at work. Apprehensive about pharmacological interventions like Zoloft and Prozac. Manages anxiety through breathing exercises, meditation, gardening, sun exposure, and reduced coffee intake (one 8-ounce cup/day). Will seek medical attention if symptoms worsen.  - Onset: Related to situational stressors at work.  - Character: Moderate anxiety.  - Alleviating Factors: Breathing exercises, meditation, gardening, sun exposure, reduced coffee intake.  - Severity: Moderate.    Sensitive Sinuses  Sensitive sinuses reported.    Weight Loss  Weight loss noted, hoping for decreased cholesterol levels. Labs will be completed.  - Character: Weight loss.  - Severity: Noted weight loss, hoping for decreased cholesterol levels.    Dermatologist visit reported as good. Appointments scheduled with ophthalmologist and dentist.      HANNAH-7 Questionnaire    Feeling nervous, anxious, or on edge:  More than half the days  Not being able to sop or control worrying:  Nearly every day  Worrying too much about different things:  Nearly every day  Trouble relaxing:  Several days  Being so restless that it's hard to sit still:  Not at all  Becoming easily annoyed or irritable:  Several days  Feeling afraid as if something awful might happen:  Several days  Total:  11    Interpretation of HANNAH 7 Total Score   Score Severity :  0-4 No Anxiety   5-9 Mild Anxiety  10-14  "Moderate Anxiety  15-21 Severe Anxiety    Problem   Stress   Anxiety   Abnormal Cbc        Current Outpatient Medications   Medication Sig    CALCIUM MAGNESIUM ZINC PO Take  by mouth every day.    Ascorbic Acid (VITAMIN C PO) Take  by mouth every day.    amoxicillin-clavulanate (AUGMENTIN) 875-125 MG Tab Take 1 Tablet by mouth 2 times a day.    fluticasone (FLONASE) 50 MCG/ACT nasal spray Administer 1 Spray into affected nostril(S) every day.    multivitamin Tab Take 1 Tablet by mouth every day.    Omega-3 Fatty Acids (FISH OIL) 1000 MG Cap capsule Take 1,000 mg by mouth 3 times a day with meals.     Allergies   Allergen Reactions    Azithromycin Rash     Z-PACK       Allergies, past medical history, past surgical history, medications, family history, social history reviewed and updated.    ROS Please see HPI    Physical Exam  Vitals: /74 (BP Location: Left arm, Patient Position: Sitting, BP Cuff Size: Adult)   Pulse (!) 57   Temp 37.3 °C (99.2 °F) (Temporal)   Resp 16   Ht 1.676 m (5' 6\")   Wt 60.4 kg (133 lb 3.2 oz)   LMP 03/09/2015   SpO2 97%   BMI 21.50 kg/m²   Physical Exam  Constitutional:       Appearance: Normal appearance.   HENT:      Head: Normocephalic and atraumatic.      Right Ear: External ear normal.      Left Ear: External ear normal.      Nose: Nose normal.      Mouth/Throat:      Pharynx: Oropharynx is clear.   Cardiovascular:      Rate and Rhythm: Normal rate and regular rhythm.      Pulses: Normal pulses.   Pulmonary:      Effort: Pulmonary effort is normal.      Breath sounds: Normal breath sounds.   Abdominal:      General: Bowel sounds are normal.      Palpations: Abdomen is soft.      Tenderness: There is no abdominal tenderness.   Musculoskeletal:      Cervical back: Neck supple.      Right lower leg: No edema.      Left lower leg: No edema.   Skin:     General: Skin is warm and dry.   Neurological:      Mental Status: She is alert. Mental status is at baseline.   Psychiatric: "         Mood and Affect: Mood normal.         Behavior: Behavior normal.         Thought Content: Thought content normal.      Comments: anxiety         Assessment and Plan    Assessment & Plan      1. Acute non-recurrent sinusitis, unspecified location  - amoxicillin-clavulanate (AUGMENTIN) 875-125 MG Tab; Take 1 Tablet by mouth 2 times a day.  Dispense: 10 Tablet; Refill: 0  - flonase  - sinus rinses  - allegra    2. Stress  3. Anxiety  - TSH WITH REFLEX TO FT4; Future  - Situational anxiety related to supervisor at work.  - Continue breathing exercises and meditation.  - Emily Herb Ashwagandha root recommended as needed  - Lavender essential oil suggested, use cautiously due to sinus sensitivity.  - Consider further evaluation and medication if symptoms worsen.    HANNAH-7 Questionnaire    Feeling nervous, anxious, or on edge:  More than half the days  Not being able to sop or control worrying:  Nearly every day  Worrying too much about different things:  Nearly every day  Trouble relaxing:  Several days  Being so restless that it's hard to sit still:  Not at all  Becoming easily annoyed or irritable:  Several days  Feeling afraid as if something awful might happen:  Several days  Total:  11    Interpretation of HANNAH 7 Total Score   Score Severity :  0-4 No Anxiety   5-9 Mild Anxiety  10-14 Moderate Anxiety  15-21 Severe Anxiety    4. Hypokalemia  - Comp Metabolic Panel; Future  Potassium rich food  Check level    5. Abnormal CBC  - CBC WITH DIFFERENTIAL; Future    6. Pure hypercholesterolemia  - Lipid Profile; Future  - TSH WITH REFLEX TO FT4; Future  Healthful lifestyle measures    7. Wellness examination  - URINALYSIS,CULTURE IF INDICATED; Future    Health maintenance.  - Dermatologist visit reported as good.  - Appointments scheduled with ophthalmologist and dentist.      Follow-up:Return in about 1 month (around 6/2/2025), or if symptoms worsen or fail to improve.    This note was created using voice recognition  software. There may be unintended errors in spelling, grammar or content.

## 2025-05-04 ENCOUNTER — PHARMACY VISIT (OUTPATIENT)
Dept: PHARMACY | Facility: MEDICAL CENTER | Age: 65
End: 2025-05-04
Payer: COMMERCIAL

## 2025-05-23 ENCOUNTER — APPOINTMENT (OUTPATIENT)
Dept: MEDICAL GROUP | Facility: IMAGING CENTER | Age: 65
End: 2025-05-23
Payer: COMMERCIAL

## 2025-06-05 ENCOUNTER — APPOINTMENT (OUTPATIENT)
Dept: MEDICAL GROUP | Facility: IMAGING CENTER | Age: 65
End: 2025-06-05
Payer: COMMERCIAL

## 2025-07-07 ENCOUNTER — APPOINTMENT (OUTPATIENT)
Dept: LAB | Facility: MEDICAL CENTER | Age: 65
End: 2025-07-07
Payer: COMMERCIAL

## 2025-07-07 DIAGNOSIS — R79.89 ABNORMAL CBC: ICD-10-CM

## 2025-07-07 DIAGNOSIS — F41.9 ANXIETY: ICD-10-CM

## 2025-07-07 DIAGNOSIS — E87.6 HYPOKALEMIA: ICD-10-CM

## 2025-07-07 DIAGNOSIS — E78.00 PURE HYPERCHOLESTEROLEMIA: ICD-10-CM

## 2025-07-07 DIAGNOSIS — Z00.00 WELLNESS EXAMINATION: ICD-10-CM

## 2025-07-07 LAB
ALBUMIN SERPL BCP-MCNC: 4.1 G/DL (ref 3.2–4.9)
ALBUMIN/GLOB SERPL: 1.2 G/DL
ALP SERPL-CCNC: 76 U/L (ref 30–99)
ALT SERPL-CCNC: 27 U/L (ref 2–50)
ANION GAP SERPL CALC-SCNC: 13 MMOL/L (ref 7–16)
APPEARANCE UR: CLEAR
AST SERPL-CCNC: 24 U/L (ref 12–45)
BACTERIA #/AREA URNS HPF: ABNORMAL /HPF
BASOPHILS # BLD AUTO: 0.7 % (ref 0–1.8)
BASOPHILS # BLD: 0.04 K/UL (ref 0–0.12)
BILIRUB SERPL-MCNC: 0.4 MG/DL (ref 0.1–1.5)
BILIRUB UR QL STRIP.AUTO: NEGATIVE
BUN SERPL-MCNC: 26 MG/DL (ref 8–22)
CALCIUM ALBUM COR SERPL-MCNC: 8.7 MG/DL (ref 8.5–10.5)
CALCIUM SERPL-MCNC: 8.8 MG/DL (ref 8.5–10.5)
CASTS URNS QL MICRO: ABNORMAL /LPF (ref 0–2)
CHLORIDE SERPL-SCNC: 104 MMOL/L (ref 96–112)
CHOLEST SERPL-MCNC: 221 MG/DL (ref 100–199)
CO2 SERPL-SCNC: 22 MMOL/L (ref 20–33)
COLOR UR: YELLOW
CREAT SERPL-MCNC: 0.71 MG/DL (ref 0.5–1.4)
EOSINOPHIL # BLD AUTO: 0.08 K/UL (ref 0–0.51)
EOSINOPHIL NFR BLD: 1.5 % (ref 0–6.9)
EPITHELIAL CELLS 1715: ABNORMAL /HPF (ref 0–5)
ERYTHROCYTE [DISTWIDTH] IN BLOOD BY AUTOMATED COUNT: 44.9 FL (ref 35.9–50)
GFR SERPLBLD CREATININE-BSD FMLA CKD-EPI: 94 ML/MIN/1.73 M 2
GLOBULIN SER CALC-MCNC: 3.3 G/DL (ref 1.9–3.5)
GLUCOSE SERPL-MCNC: 101 MG/DL (ref 65–99)
GLUCOSE UR STRIP.AUTO-MCNC: NEGATIVE MG/DL
HCT VFR BLD AUTO: 47.6 % (ref 37–47)
HDLC SERPL-MCNC: 63 MG/DL
HGB BLD-MCNC: 15.2 G/DL (ref 12–16)
IMM GRANULOCYTES # BLD AUTO: 0.01 K/UL (ref 0–0.11)
IMM GRANULOCYTES NFR BLD AUTO: 0.2 % (ref 0–0.9)
KETONES UR STRIP.AUTO-MCNC: NEGATIVE MG/DL
LDLC SERPL CALC-MCNC: 142 MG/DL
LEUKOCYTE ESTERASE UR QL STRIP.AUTO: ABNORMAL
LYMPHOCYTES # BLD AUTO: 2.01 K/UL (ref 1–4.8)
LYMPHOCYTES NFR BLD: 37.6 % (ref 22–41)
MCH RBC QN AUTO: 29.7 PG (ref 27–33)
MCHC RBC AUTO-ENTMCNC: 31.9 G/DL (ref 32.2–35.5)
MCV RBC AUTO: 93.2 FL (ref 81.4–97.8)
MICRO URNS: ABNORMAL
MONOCYTES # BLD AUTO: 0.45 K/UL (ref 0–0.85)
MONOCYTES NFR BLD AUTO: 8.4 % (ref 0–13.4)
NEUTROPHILS # BLD AUTO: 2.76 K/UL (ref 1.82–7.42)
NEUTROPHILS NFR BLD: 51.6 % (ref 44–72)
NITRITE UR QL STRIP.AUTO: NEGATIVE
NRBC # BLD AUTO: 0 K/UL
NRBC BLD-RTO: 0 /100 WBC (ref 0–0.2)
PH UR STRIP.AUTO: 6.5 [PH] (ref 5–8)
PLATELET # BLD AUTO: 237 K/UL (ref 164–446)
PMV BLD AUTO: 12 FL (ref 9–12.9)
POTASSIUM SERPL-SCNC: 4.4 MMOL/L (ref 3.6–5.5)
PROT SERPL-MCNC: 7.4 G/DL (ref 6–8.2)
PROT UR QL STRIP: NEGATIVE MG/DL
RBC # BLD AUTO: 5.11 M/UL (ref 4.2–5.4)
RBC # URNS HPF: ABNORMAL /HPF (ref 0–2)
RBC UR QL AUTO: NEGATIVE
SODIUM SERPL-SCNC: 139 MMOL/L (ref 135–145)
SP GR UR STRIP.AUTO: 1.01
TRIGL SERPL-MCNC: 82 MG/DL (ref 0–149)
TSH SERPL DL<=0.005 MIU/L-ACNC: 1.62 UIU/ML (ref 0.38–5.33)
UROBILINOGEN UR STRIP.AUTO-MCNC: 0.2 EU/DL
WBC # BLD AUTO: 5.4 K/UL (ref 4.8–10.8)
WBC #/AREA URNS HPF: ABNORMAL /HPF

## 2025-07-07 PROCEDURE — 80053 COMPREHEN METABOLIC PANEL: CPT

## 2025-07-07 PROCEDURE — 84443 ASSAY THYROID STIM HORMONE: CPT

## 2025-07-07 PROCEDURE — 80061 LIPID PANEL: CPT

## 2025-07-07 PROCEDURE — 81001 URINALYSIS AUTO W/SCOPE: CPT

## 2025-07-07 PROCEDURE — 36415 COLL VENOUS BLD VENIPUNCTURE: CPT

## 2025-07-07 PROCEDURE — 85025 COMPLETE CBC W/AUTO DIFF WBC: CPT

## 2025-07-08 ENCOUNTER — RESULTS FOLLOW-UP (OUTPATIENT)
Dept: MEDICAL GROUP | Facility: IMAGING CENTER | Age: 65
End: 2025-07-08
Payer: COMMERCIAL

## 2025-07-14 ENCOUNTER — HOSPITAL ENCOUNTER (OUTPATIENT)
Facility: MEDICAL CENTER | Age: 65
End: 2025-07-14
Attending: NURSE PRACTITIONER
Payer: COMMERCIAL

## 2025-07-14 PROCEDURE — 81003 URINALYSIS AUTO W/O SCOPE: CPT

## 2025-07-14 PROCEDURE — 87086 URINE CULTURE/COLONY COUNT: CPT

## 2025-07-15 ENCOUNTER — EH NON-PROVIDER (OUTPATIENT)
Dept: OCCUPATIONAL MEDICINE | Facility: CLINIC | Age: 65
End: 2025-07-15

## 2025-07-15 ENCOUNTER — HOSPITAL ENCOUNTER (OUTPATIENT)
Facility: MEDICAL CENTER | Age: 65
End: 2025-07-15
Attending: NURSE PRACTITIONER
Payer: COMMERCIAL

## 2025-07-15 DIAGNOSIS — Z02.89 ENCOUNTER FOR OCCUPATIONAL HEALTH EXAMINATION: Primary | ICD-10-CM

## 2025-07-15 DIAGNOSIS — Z02.89 ENCOUNTER FOR OCCUPATIONAL HEALTH EXAMINATION: ICD-10-CM

## 2025-07-15 LAB
APPEARANCE UR: CLEAR
BILIRUB UR QL STRIP.AUTO: NEGATIVE
COLOR UR: YELLOW
GLUCOSE UR STRIP.AUTO-MCNC: NEGATIVE MG/DL
KETONES UR STRIP.AUTO-MCNC: NEGATIVE MG/DL
LEUKOCYTE ESTERASE UR QL STRIP.AUTO: NEGATIVE
MICRO URNS: NORMAL
NITRITE UR QL STRIP.AUTO: NEGATIVE
PH UR STRIP.AUTO: 7 [PH] (ref 5–8)
PROT UR QL STRIP: NEGATIVE MG/DL
RBC UR QL AUTO: NEGATIVE
SP GR UR STRIP.AUTO: 1
UROBILINOGEN UR STRIP.AUTO-MCNC: 0.2 EU/DL

## 2025-07-15 PROCEDURE — 86735 MUMPS ANTIBODY: CPT | Performed by: NURSE PRACTITIONER

## 2025-07-15 PROCEDURE — 86787 VARICELLA-ZOSTER ANTIBODY: CPT

## 2025-07-15 PROCEDURE — 86762 RUBELLA ANTIBODY: CPT | Performed by: NURSE PRACTITIONER

## 2025-07-15 PROCEDURE — 86787 VARICELLA-ZOSTER ANTIBODY: CPT | Performed by: NURSE PRACTITIONER

## 2025-07-15 PROCEDURE — 86762 RUBELLA ANTIBODY: CPT

## 2025-07-15 PROCEDURE — 86735 MUMPS ANTIBODY: CPT

## 2025-07-15 PROCEDURE — 86765 RUBEOLA ANTIBODY: CPT

## 2025-07-15 PROCEDURE — 86765 RUBEOLA ANTIBODY: CPT | Performed by: NURSE PRACTITIONER

## 2025-07-17 LAB
BACTERIA UR CULT: NORMAL
MEV IGG SER-ACNC: 29.3 AU/ML
MUV IGG SER IA-ACNC: 217 AU/ML
RUBV AB SER QL: 32.9 IU/ML
SIGNIFICANT IND 70042: NORMAL
SITE SITE: NORMAL
SOURCE SOURCE: NORMAL
VZV IGG SER IA-ACNC: 12.7 S/CO

## 2025-07-25 ENCOUNTER — APPOINTMENT (OUTPATIENT)
Dept: RADIOLOGY | Facility: MEDICAL CENTER | Age: 65
End: 2025-07-25
Attending: INTERNAL MEDICINE
Payer: COMMERCIAL

## 2025-09-04 ENCOUNTER — APPOINTMENT (OUTPATIENT)
Dept: MEDICAL GROUP | Facility: IMAGING CENTER | Age: 65
End: 2025-09-04